# Patient Record
Sex: MALE | URBAN - NONMETROPOLITAN AREA
[De-identification: names, ages, dates, MRNs, and addresses within clinical notes are randomized per-mention and may not be internally consistent; named-entity substitution may affect disease eponyms.]

---

## 2017-08-08 ENCOUNTER — APPOINTMENT (RX ONLY)
Dept: URBAN - NONMETROPOLITAN AREA CLINIC 1 | Facility: CLINIC | Age: 67
Setting detail: DERMATOLOGY
End: 2017-08-08

## 2017-08-08 DIAGNOSIS — L81.4 OTHER MELANIN HYPERPIGMENTATION: ICD-10-CM

## 2017-08-08 DIAGNOSIS — D485 NEOPLASM OF UNCERTAIN BEHAVIOR OF SKIN: ICD-10-CM

## 2017-08-08 DIAGNOSIS — L57.0 ACTINIC KERATOSIS: ICD-10-CM

## 2017-08-08 PROBLEM — D48.5 NEOPLASM OF UNCERTAIN BEHAVIOR OF SKIN: Status: ACTIVE | Noted: 2017-08-08

## 2017-08-08 PROBLEM — I10 ESSENTIAL (PRIMARY) HYPERTENSION: Status: ACTIVE | Noted: 2017-08-08

## 2017-08-08 PROBLEM — I25.10 ATHEROSCLEROTIC HEART DISEASE OF NATIVE CORONARY ARTERY WITHOUT ANGINA PECTORIS: Status: ACTIVE | Noted: 2017-08-08

## 2017-08-08 PROCEDURE — 17003 DESTRUCT PREMALG LES 2-14: CPT

## 2017-08-08 PROCEDURE — 11100: CPT | Mod: 59

## 2017-08-08 PROCEDURE — ? OBSERVATION

## 2017-08-08 PROCEDURE — ? LIQUID NITROGEN

## 2017-08-08 PROCEDURE — ? BIOPSY BY SHAVE METHOD

## 2017-08-08 PROCEDURE — 99202 OFFICE O/P NEW SF 15 MIN: CPT | Mod: 25

## 2017-08-08 PROCEDURE — ? COUNSELING

## 2017-08-08 PROCEDURE — 17000 DESTRUCT PREMALG LESION: CPT

## 2017-08-08 ASSESSMENT — LOCATION DETAILED DESCRIPTION DERM
LOCATION DETAILED: NASAL DORSUM
LOCATION DETAILED: RIGHT SUPERIOR UPPER BACK
LOCATION DETAILED: LEFT CLAVICULAR NECK
LOCATION DETAILED: RIGHT INFERIOR LATERAL FOREHEAD

## 2017-08-08 ASSESSMENT — LOCATION SIMPLE DESCRIPTION DERM
LOCATION SIMPLE: NOSE
LOCATION SIMPLE: LEFT ANTERIOR NECK
LOCATION SIMPLE: RIGHT UPPER BACK
LOCATION SIMPLE: RIGHT FOREHEAD

## 2017-08-08 ASSESSMENT — LOCATION ZONE DERM
LOCATION ZONE: FACE
LOCATION ZONE: NECK
LOCATION ZONE: NOSE
LOCATION ZONE: TRUNK

## 2017-08-08 NOTE — PROCEDURE: LIQUID NITROGEN
Detail Level: Simple
Post-Care Instructions: I reviewed with the patient in detail post-care instructions. Patient is to wear sunprotection, and avoid picking at any of the treated lesions. Pt may apply Vaseline to crusted or scabbing areas.
Render Post-Care Instructions In Note?: no
Duration Of Freeze Thaw-Cycle (Seconds): 8
Consent: The patient's consent was obtained including but not limited to risks of crusting, scabbing, blistering, scarring, darker or lighter pigmentary change, recurrence, incomplete removal and infection.
Number Of Freeze-Thaw Cycles: 1 freeze-thaw cycle

## 2017-08-21 DIAGNOSIS — E78.5 DYSLIPIDEMIA: ICD-10-CM

## 2017-08-21 RX ORDER — SIMVASTATIN 40 MG
40 TABLET ORAL EVERY EVENING
Qty: 90 TAB | Refills: 0 | OUTPATIENT
Start: 2017-08-21 | End: 2018-01-12 | Stop reason: SDUPTHER

## 2017-10-19 ENCOUNTER — TELEPHONE (OUTPATIENT)
Dept: CARDIOLOGY | Facility: MEDICAL CENTER | Age: 67
End: 2017-10-19

## 2017-10-19 DIAGNOSIS — E78.5 DYSLIPIDEMIA: ICD-10-CM

## 2017-10-19 DIAGNOSIS — I25.10 CORONARY ARTERY DISEASE INVOLVING NATIVE CORONARY ARTERY OF NATIVE HEART WITHOUT ANGINA PECTORIS: ICD-10-CM

## 2017-10-19 NOTE — TELEPHONE ENCOUNTER
Called patient, he has not had lab work done since 2016.     Labs ordered, slips mailed to patient. Patient does mention that he will have his lab work done at a [facility].    BEE PRATT

## 2017-10-19 NOTE — TELEPHONE ENCOUNTER
----- Message from Isaiah Mccormick sent at 10/19/2017  9:35 AM PDT -----  Regarding: Question about labs for upcoming visit with LA 10/31  Contact: [phone]  LA/Yvette Sullivan wants to know if he's need's labs done in preparation for upcoming visit 10/31 with LA. Pt can be reached at [phone].

## 2017-10-24 ENCOUNTER — HOSPITAL ENCOUNTER (OUTPATIENT)
Dept: LAB | Facility: MEDICAL CENTER | Age: 67
End: 2017-10-24
Attending: INTERNAL MEDICINE
Payer: MEDICARE

## 2017-10-24 DIAGNOSIS — E78.5 DYSLIPIDEMIA: ICD-10-CM

## 2017-10-24 DIAGNOSIS — I25.10 CORONARY ARTERY DISEASE INVOLVING NATIVE CORONARY ARTERY OF NATIVE HEART WITHOUT ANGINA PECTORIS: ICD-10-CM

## 2017-10-24 LAB
ALBUMIN SERPL BCP-MCNC: 4 G/DL (ref 3.2–4.9)
ALBUMIN/GLOB SERPL: 1.4 G/DL
ALP SERPL-CCNC: 64 U/L (ref 30–99)
ALT SERPL-CCNC: 14 U/L (ref 2–50)
ANION GAP SERPL CALC-SCNC: 7 MMOL/L (ref 0–11.9)
AST SERPL-CCNC: 21 U/L (ref 12–45)
BILIRUB SERPL-MCNC: 0.6 MG/DL (ref 0.1–1.5)
BUN SERPL-MCNC: 19 MG/DL (ref 8–22)
CALCIUM SERPL-MCNC: 9.4 MG/DL (ref 8.5–10.5)
CHLORIDE SERPL-SCNC: 106 MMOL/L (ref 96–112)
CHOLEST SERPL-MCNC: 130 MG/DL (ref 100–199)
CO2 SERPL-SCNC: 27 MMOL/L (ref 20–33)
CREAT SERPL-MCNC: 0.89 MG/DL (ref 0.5–1.4)
GFR SERPL CREATININE-BSD FRML MDRD: >60 ML/MIN/1.73 M 2
GLOBULIN SER CALC-MCNC: 2.9 G/DL (ref 1.9–3.5)
GLUCOSE SERPL-MCNC: 85 MG/DL (ref 65–99)
HDLC SERPL-MCNC: 56 MG/DL
LDLC SERPL CALC-MCNC: 64 MG/DL
POTASSIUM SERPL-SCNC: 4 MMOL/L (ref 3.6–5.5)
PROT SERPL-MCNC: 6.9 G/DL (ref 6–8.2)
SODIUM SERPL-SCNC: 140 MMOL/L (ref 135–145)
TRIGL SERPL-MCNC: 52 MG/DL (ref 0–149)

## 2017-10-24 PROCEDURE — 80061 LIPID PANEL: CPT

## 2017-10-24 PROCEDURE — 80053 COMPREHEN METABOLIC PANEL: CPT

## 2017-10-24 PROCEDURE — 36415 COLL VENOUS BLD VENIPUNCTURE: CPT

## 2017-10-31 ENCOUNTER — OFFICE VISIT (OUTPATIENT)
Dept: CARDIOLOGY | Facility: MEDICAL CENTER | Age: 67
End: 2017-10-31
Payer: MEDICARE

## 2017-10-31 VITALS
WEIGHT: 143.2 LBS | DIASTOLIC BLOOD PRESSURE: 92 MMHG | HEART RATE: 80 BPM | OXYGEN SATURATION: 98 % | HEIGHT: 67 IN | BODY MASS INDEX: 22.47 KG/M2 | SYSTOLIC BLOOD PRESSURE: 142 MMHG

## 2017-10-31 DIAGNOSIS — I25.10 CORONARY ARTERY DISEASE INVOLVING NATIVE CORONARY ARTERY OF NATIVE HEART WITHOUT ANGINA PECTORIS: ICD-10-CM

## 2017-10-31 PROCEDURE — 99214 OFFICE O/P EST MOD 30 MIN: CPT | Performed by: INTERNAL MEDICINE

## 2017-10-31 ASSESSMENT — ENCOUNTER SYMPTOMS
NAUSEA: 0
SPEECH CHANGE: 0
LOSS OF CONSCIOUSNESS: 0
COUGH: 0
DEPRESSION: 0
MYALGIAS: 0
WEIGHT LOSS: 0
WHEEZING: 0
PALPITATIONS: 0
NERVOUS/ANXIOUS: 0
ABDOMINAL PAIN: 0
HEARTBURN: 0

## 2017-10-31 NOTE — PROGRESS NOTES
"Subjective:   Kaleb Yanes is a 67 y.o. male who presents today In follow-up in regards to his remote bypass surgery and hyperlipidemia    Doing well  Parents in [place], working with them and perhaps assisted living  No setbacks, medications as directed    Past Medical History:   Diagnosis Date   • CAD (coronary artery disease)    • Hyperlipidemia    • Hypertension      Past Surgical History:   Procedure Laterality Date   • CARDIAC CATH  8/16/07    BMS to Circ   • OTHER CARDIAC SURGERY      stent x 1 aug 07     Family History   Problem Relation Age of Onset   • Heart Failure Neg Hx    • Heart Disease Neg Hx      History   Smoking Status   • Former Smoker   • Years: 30.00   • Quit date: 8/16/2007   Smokeless Tobacco   • Never Used     Comment: quit aug 15 07     Allergies   Allergen Reactions   • Nkda [No Known Drug Allergy]      Outpatient Encounter Prescriptions as of 10/31/2017   Medication Sig Dispense Refill   • simvastatin (ZOCOR) 40 MG Tab Take 1 Tab by mouth every evening. Please see your cardiologist for refills 90 Tab 0   • ASPIRIN 81 MG PO TABS 81 mg by Oral route QDAILY (RT).        No facility-administered encounter medications on file as of 10/31/2017.      Review of Systems   Constitutional: Negative for malaise/fatigue and weight loss.   HENT: Negative for congestion.    Respiratory: Negative for cough and wheezing.    Cardiovascular: Negative for chest pain, palpitations and leg swelling.   Gastrointestinal: Negative for abdominal pain, heartburn and nausea.   Musculoskeletal: Negative for joint pain and myalgias.   Neurological: Negative for speech change and loss of consciousness.   Psychiatric/Behavioral: Negative for depression. The patient is not nervous/anxious.    All other systems reviewed and are negative.       Objective:   /92   Pulse 80   Ht 1.702 m (5' 7\")   Wt 65 kg (143 lb 3.2 oz)   SpO2 98%   BMI 22.43 kg/m²     Physical Exam   Constitutional: He is oriented to " person, place, and time. He appears well-developed and well-nourished.   Thin, healthy-appearing   HENT:   Head: Normocephalic and atraumatic.   Mouth/Throat: Mucous membranes are normal.   Eyes: EOM are normal. Pupils are equal, round, and reactive to light. No scleral icterus.   Neck: No JVD present. No thyroid mass and no thyromegaly present.   Cardiovascular: Normal rate, regular rhythm and intact distal pulses.    No murmur heard.  Pulmonary/Chest: Effort normal and breath sounds normal. He exhibits no tenderness.   Abdominal: Bowel sounds are normal.   Musculoskeletal: Normal range of motion. He exhibits no edema.   Neurological: He is alert and oriented to person, place, and time. He has normal strength. He displays no tremor.   Skin: Skin is warm and dry. No rash noted.   Psychiatric: He has a normal mood and affect. His behavior is normal.   Vitals reviewed.      Assessment:     1. Coronary artery disease involving native coronary artery of native heart without angina pectoris         Medical Decision Making:  Today's Assessment / Status / Plan:       Coronary disease   Low risk nuclear and echo last year, unfortunately awfully expensive for him. I apologized again   Laboratory data from last week reviewed. Normal and reassuring. Repeat annually     Lipids   As above, on a statin     Discussed prognosis which is quite good. Left ventricular function normal. Follow-up in one year or as needed     Questions answered     eeling much better, seen and examined. Has had no more pain since using bengay on the left side of his neck    Still very active, thinking about starting yoga and stretching

## 2018-01-12 DIAGNOSIS — E78.5 DYSLIPIDEMIA: ICD-10-CM

## 2018-01-12 RX ORDER — SIMVASTATIN 40 MG
TABLET ORAL
Qty: 90 TAB | Refills: 3 | Status: SHIPPED | OUTPATIENT
Start: 2018-01-12 | End: 2019-01-15 | Stop reason: SDUPTHER

## 2019-01-15 DIAGNOSIS — E78.5 DYSLIPIDEMIA: ICD-10-CM

## 2019-01-16 RX ORDER — SIMVASTATIN 40 MG
40 TABLET ORAL EVERY EVENING
Qty: 90 TAB | Refills: 0 | Status: SHIPPED | OUTPATIENT
Start: 2019-01-16 | End: 2019-05-17 | Stop reason: SDUPTHER

## 2019-03-11 ENCOUNTER — TELEPHONE (OUTPATIENT)
Dept: CARDIOLOGY | Facility: MEDICAL CENTER | Age: 69
End: 2019-03-11

## 2019-03-11 DIAGNOSIS — Z95.5 S/P CORONARY ARTERY STENT PLACEMENT: ICD-10-CM

## 2019-03-11 DIAGNOSIS — E78.5 DYSLIPIDEMIA: ICD-10-CM

## 2019-03-11 DIAGNOSIS — I25.10 CORONARY ARTERY DISEASE INVOLVING NATIVE CORONARY ARTERY OF NATIVE HEART WITHOUT ANGINA PECTORIS: ICD-10-CM

## 2019-03-11 NOTE — TELEPHONE ENCOUNTER
OLIVE Chahal/Leana       Patient is calling to ask if he needs labs for 03/28 appt. He can be reached at [phone].      CMP and Lipid profile ordered, lab slip mailed to pt.     Called pt and notified, pt verbalizes understanding

## 2019-03-19 ENCOUNTER — HOSPITAL ENCOUNTER (OUTPATIENT)
Dept: LAB | Facility: MEDICAL CENTER | Age: 69
End: 2019-03-19
Attending: INTERNAL MEDICINE
Payer: MEDICARE

## 2019-03-19 DIAGNOSIS — Z95.5 S/P CORONARY ARTERY STENT PLACEMENT: ICD-10-CM

## 2019-03-19 DIAGNOSIS — E78.5 DYSLIPIDEMIA: ICD-10-CM

## 2019-03-19 DIAGNOSIS — I25.10 CORONARY ARTERY DISEASE INVOLVING NATIVE CORONARY ARTERY OF NATIVE HEART WITHOUT ANGINA PECTORIS: ICD-10-CM

## 2019-03-19 LAB
ALBUMIN SERPL BCP-MCNC: 4.3 G/DL (ref 3.2–4.9)
ALBUMIN/GLOB SERPL: 1.3 G/DL
ALP SERPL-CCNC: 59 U/L (ref 30–99)
ALT SERPL-CCNC: 17 U/L (ref 2–50)
ANION GAP SERPL CALC-SCNC: 8 MMOL/L (ref 0–11.9)
AST SERPL-CCNC: 20 U/L (ref 12–45)
BILIRUB SERPL-MCNC: 0.5 MG/DL (ref 0.1–1.5)
BUN SERPL-MCNC: 18 MG/DL (ref 8–22)
CALCIUM SERPL-MCNC: 8.9 MG/DL (ref 8.5–10.5)
CHLORIDE SERPL-SCNC: 106 MMOL/L (ref 96–112)
CHOLEST SERPL-MCNC: 135 MG/DL (ref 100–199)
CO2 SERPL-SCNC: 27 MMOL/L (ref 20–33)
CREAT SERPL-MCNC: 0.92 MG/DL (ref 0.5–1.4)
FASTING STATUS PATIENT QL REPORTED: NORMAL
GLOBULIN SER CALC-MCNC: 3.2 G/DL (ref 1.9–3.5)
GLUCOSE SERPL-MCNC: 86 MG/DL (ref 65–99)
HDLC SERPL-MCNC: 54 MG/DL
LDLC SERPL CALC-MCNC: 70 MG/DL
POTASSIUM SERPL-SCNC: 4.1 MMOL/L (ref 3.6–5.5)
PROT SERPL-MCNC: 7.5 G/DL (ref 6–8.2)
SODIUM SERPL-SCNC: 141 MMOL/L (ref 135–145)
TRIGL SERPL-MCNC: 55 MG/DL (ref 0–149)

## 2019-03-19 PROCEDURE — 36415 COLL VENOUS BLD VENIPUNCTURE: CPT

## 2019-03-19 PROCEDURE — 80053 COMPREHEN METABOLIC PANEL: CPT

## 2019-03-19 PROCEDURE — 80061 LIPID PANEL: CPT

## 2019-03-28 ENCOUNTER — OFFICE VISIT (OUTPATIENT)
Dept: CARDIOLOGY | Facility: MEDICAL CENTER | Age: 69
End: 2019-03-28
Payer: MEDICARE

## 2019-03-28 VITALS
SYSTOLIC BLOOD PRESSURE: 120 MMHG | BODY MASS INDEX: 23.54 KG/M2 | HEART RATE: 82 BPM | OXYGEN SATURATION: 98 % | DIASTOLIC BLOOD PRESSURE: 80 MMHG | WEIGHT: 150 LBS | HEIGHT: 67 IN

## 2019-03-28 DIAGNOSIS — Z95.5 S/P CORONARY ARTERY STENT PLACEMENT: ICD-10-CM

## 2019-03-28 DIAGNOSIS — E78.5 DYSLIPIDEMIA: ICD-10-CM

## 2019-03-28 DIAGNOSIS — I25.10 CORONARY ARTERY DISEASE INVOLVING NATIVE CORONARY ARTERY OF NATIVE HEART WITHOUT ANGINA PECTORIS: ICD-10-CM

## 2019-03-28 PROCEDURE — 99214 OFFICE O/P EST MOD 30 MIN: CPT | Performed by: INTERNAL MEDICINE

## 2019-03-28 RX ORDER — ROSUVASTATIN CALCIUM 40 MG/1
40 TABLET, COATED ORAL DAILY
Qty: 90 TAB | Refills: 3 | Status: SHIPPED | OUTPATIENT
Start: 2019-03-28 | End: 2019-05-15

## 2019-03-28 ASSESSMENT — ENCOUNTER SYMPTOMS
PALPITATIONS: 0
ABDOMINAL PAIN: 0
DEPRESSION: 0
HEMOPTYSIS: 0
EYE DISCHARGE: 0
BRUISES/BLEEDS EASILY: 0
WHEEZING: 0
EYE PAIN: 0
FEVER: 0
VOMITING: 0
SPEECH CHANGE: 0
NERVOUS/ANXIOUS: 0
COUGH: 0
LOSS OF CONSCIOUSNESS: 0
MYALGIAS: 0
BLURRED VISION: 0
NAUSEA: 0
CHILLS: 0

## 2019-04-09 ENCOUNTER — APPOINTMENT (RX ONLY)
Dept: URBAN - NONMETROPOLITAN AREA CLINIC 1 | Facility: CLINIC | Age: 69
Setting detail: DERMATOLOGY
End: 2019-04-09

## 2019-04-09 DIAGNOSIS — L82.1 OTHER SEBORRHEIC KERATOSIS: ICD-10-CM

## 2019-04-09 PROCEDURE — 99213 OFFICE O/P EST LOW 20 MIN: CPT

## 2019-04-09 PROCEDURE — ? COUNSELING

## 2019-04-09 ASSESSMENT — LOCATION DETAILED DESCRIPTION DERM: LOCATION DETAILED: LEFT MEDIAL SUPERIOR CHEST

## 2019-04-09 ASSESSMENT — LOCATION SIMPLE DESCRIPTION DERM: LOCATION SIMPLE: CHEST

## 2019-04-09 ASSESSMENT — LOCATION ZONE DERM: LOCATION ZONE: TRUNK

## 2019-05-15 ENCOUNTER — TELEPHONE (OUTPATIENT)
Dept: CARDIOLOGY | Facility: MEDICAL CENTER | Age: 69
End: 2019-05-15

## 2019-05-15 DIAGNOSIS — E78.5 DYSLIPIDEMIA: ICD-10-CM

## 2019-05-15 RX ORDER — SIMVASTATIN 40 MG
40 TABLET ORAL EVERY EVENING
Qty: 90 TAB | Refills: 3 | Status: CANCELLED | OUTPATIENT
Start: 2019-05-15

## 2019-05-15 NOTE — TELEPHONE ENCOUNTER
Jenny Simental, Med Ass't  Leana Castanon, R.N.             Hi, there are two statins listed on the med list, please remove the statin therapy that is not current   Thank you      Called pt, clarified statin medications, pt reports he is taking Simvastatin and not Rosuvastatin.     MAR updated.     FYI to Jenny

## 2019-05-17 ENCOUNTER — TELEPHONE (OUTPATIENT)
Dept: CARDIOLOGY | Facility: MEDICAL CENTER | Age: 69
End: 2019-05-17

## 2019-05-17 DIAGNOSIS — E78.5 DYSLIPIDEMIA: ICD-10-CM

## 2019-05-17 RX ORDER — SIMVASTATIN 40 MG
40 TABLET ORAL EVERY EVENING
Qty: 90 TAB | Refills: 3 | Status: SHIPPED | OUTPATIENT
Start: 2019-05-17 | End: 2021-05-26

## 2019-05-17 NOTE — TELEPHONE ENCOUNTER
Problem with prescription refill going out of town tomorrow   Received: Today   Message Contents   Melissa Alberto R.N.   Phone Number: [phone]             LA/Malina     Pt reports there is a problem with his prescription refill for simvastatin 40 mg. He is going out of town tomorrow and would please like a call back soon at [phone].      Called pt, pt reports that he was unable to fill simvastatin RX due to crestor being on file. Pt confirmed that he is taking simvastatin, not crestor. Refill for simvastatin sent to [facility] pharmacy per pt's request. Called pharmacy to discontinue crestor.    FYI to Dr. Quintanilla.

## 2019-08-14 ENCOUNTER — TELEPHONE (OUTPATIENT)
Dept: CARDIOLOGY | Facility: MEDICAL CENTER | Age: 69
End: 2019-08-14

## 2019-08-14 NOTE — TELEPHONE ENCOUNTER
Tanisha Quintanilla M.D.  Leana Castanon R.N.             I spoke to this patient this morning.  Having some chest pain but felt better.  He lives somewhat far away.  I suggested go to the ER if he did not feel better if not happy to talk about an angiogram or nuclear perfusion imaging study based on his history.  He does not need to be seen to have these set up.  Could you let me know?  Thank you so much        Called pt, pt reports he is feeling better, he will cont to monitor, he will let us know if symptoms comes back or he will go to nearest ER if symptoms worsens. Pt appreciative of call     FYI to Dr Quintanilla

## 2021-05-07 ENCOUNTER — TELEPHONE (OUTPATIENT)
Dept: CARDIOLOGY | Facility: MEDICAL CENTER | Age: 71
End: 2021-05-07

## 2021-05-07 DIAGNOSIS — Z95.5 S/P CORONARY ARTERY STENT PLACEMENT: ICD-10-CM

## 2021-05-07 DIAGNOSIS — E78.5 DYSLIPIDEMIA: ICD-10-CM

## 2021-05-07 DIAGNOSIS — I25.10 CORONARY ARTERY DISEASE INVOLVING NATIVE CORONARY ARTERY OF NATIVE HEART WITHOUT ANGINA PECTORIS: ICD-10-CM

## 2021-05-07 NOTE — TELEPHONE ENCOUNTER
MARY KATE    Pt called asking if he needs to have blood work done before his appt with DA scheduled for 5/26/21. Pt states he will be using a [facility]. Please call Pt back [phone].    Thank you

## 2021-05-07 NOTE — TELEPHONE ENCOUNTER
Called pt and notified that last labs on file are from 2019, so he is due for routine fasting labs. CMP and lipid order placed.

## 2021-05-20 ENCOUNTER — HOSPITAL ENCOUNTER (OUTPATIENT)
Dept: LAB | Facility: MEDICAL CENTER | Age: 71
End: 2021-05-20
Attending: INTERNAL MEDICINE
Payer: MEDICARE

## 2021-05-20 DIAGNOSIS — Z95.5 S/P CORONARY ARTERY STENT PLACEMENT: ICD-10-CM

## 2021-05-20 DIAGNOSIS — E78.5 DYSLIPIDEMIA: ICD-10-CM

## 2021-05-20 DIAGNOSIS — I25.10 CORONARY ARTERY DISEASE INVOLVING NATIVE CORONARY ARTERY OF NATIVE HEART WITHOUT ANGINA PECTORIS: ICD-10-CM

## 2021-05-20 LAB
ALBUMIN SERPL BCP-MCNC: 4.2 G/DL (ref 3.2–4.9)
ALBUMIN/GLOB SERPL: 1.4 G/DL
ALP SERPL-CCNC: 69 U/L (ref 30–99)
ALT SERPL-CCNC: 17 U/L (ref 2–50)
ANION GAP SERPL CALC-SCNC: 9 MMOL/L (ref 7–16)
AST SERPL-CCNC: 18 U/L (ref 12–45)
BILIRUB SERPL-MCNC: 0.5 MG/DL (ref 0.1–1.5)
BUN SERPL-MCNC: 18 MG/DL (ref 8–22)
CALCIUM SERPL-MCNC: 9.3 MG/DL (ref 8.5–10.5)
CHLORIDE SERPL-SCNC: 105 MMOL/L (ref 96–112)
CHOLEST SERPL-MCNC: 200 MG/DL (ref 100–199)
CO2 SERPL-SCNC: 24 MMOL/L (ref 20–33)
CREAT SERPL-MCNC: 0.88 MG/DL (ref 0.5–1.4)
FASTING STATUS PATIENT QL REPORTED: NORMAL
GLOBULIN SER CALC-MCNC: 3.1 G/DL (ref 1.9–3.5)
GLUCOSE SERPL-MCNC: 95 MG/DL (ref 65–99)
HDLC SERPL-MCNC: 58 MG/DL
LDLC SERPL CALC-MCNC: 129 MG/DL
POTASSIUM SERPL-SCNC: 4.4 MMOL/L (ref 3.6–5.5)
PROT SERPL-MCNC: 7.3 G/DL (ref 6–8.2)
SODIUM SERPL-SCNC: 138 MMOL/L (ref 135–145)
TRIGL SERPL-MCNC: 65 MG/DL (ref 0–149)

## 2021-05-20 PROCEDURE — 80061 LIPID PANEL: CPT

## 2021-05-20 PROCEDURE — 36415 COLL VENOUS BLD VENIPUNCTURE: CPT

## 2021-05-20 PROCEDURE — 80053 COMPREHEN METABOLIC PANEL: CPT

## 2021-05-26 ENCOUNTER — OFFICE VISIT (OUTPATIENT)
Dept: CARDIOLOGY | Facility: MEDICAL CENTER | Age: 71
End: 2021-05-26
Payer: MEDICARE

## 2021-05-26 VITALS
HEART RATE: 82 BPM | DIASTOLIC BLOOD PRESSURE: 84 MMHG | BODY MASS INDEX: 22.91 KG/M2 | HEIGHT: 67 IN | SYSTOLIC BLOOD PRESSURE: 138 MMHG | WEIGHT: 146 LBS | OXYGEN SATURATION: 98 %

## 2021-05-26 DIAGNOSIS — E78.00 ELEVATED LDL CHOLESTEROL LEVEL: ICD-10-CM

## 2021-05-26 DIAGNOSIS — E78.5 DYSLIPIDEMIA: ICD-10-CM

## 2021-05-26 DIAGNOSIS — Z95.5 S/P CORONARY ARTERY STENT PLACEMENT: ICD-10-CM

## 2021-05-26 DIAGNOSIS — I25.10 CORONARY ARTERY DISEASE INVOLVING NATIVE CORONARY ARTERY OF NATIVE HEART WITHOUT ANGINA PECTORIS: ICD-10-CM

## 2021-05-26 DIAGNOSIS — I10 ESSENTIAL HYPERTENSION: ICD-10-CM

## 2021-05-26 LAB — EKG IMPRESSION: NORMAL

## 2021-05-26 PROCEDURE — 99214 OFFICE O/P EST MOD 30 MIN: CPT | Performed by: INTERNAL MEDICINE

## 2021-05-26 PROCEDURE — 93000 ELECTROCARDIOGRAM COMPLETE: CPT | Performed by: INTERNAL MEDICINE

## 2021-05-26 RX ORDER — SIMVASTATIN 40 MG
TABLET ORAL
COMMUNITY
End: 2021-05-26

## 2021-05-26 RX ORDER — ATORVASTATIN CALCIUM 20 MG/1
20 TABLET, FILM COATED ORAL DAILY
Qty: 90 TABLET | Refills: 3 | Status: SHIPPED | OUTPATIENT
Start: 2021-05-26 | End: 2022-07-20 | Stop reason: SDUPTHER

## 2021-05-26 ASSESSMENT — ENCOUNTER SYMPTOMS
NIGHT SWEATS: 0
PARESTHESIAS: 0
SHORTNESS OF BREATH: 0
BLURRED VISION: 0
LOSS OF BALANCE: 0
DYSPNEA ON EXERTION: 0
IRREGULAR HEARTBEAT: 0
WEAKNESS: 0
WHEEZING: 0
BACK PAIN: 0
MYALGIAS: 0
HEADACHES: 0
CONSTIPATION: 0
NUMBNESS: 0
SORE THROAT: 0
ORTHOPNEA: 0
SYNCOPE: 0
LIGHT-HEADEDNESS: 0
NEAR-SYNCOPE: 0
EXCESSIVE DAYTIME SLEEPINESS: 0
NAUSEA: 0
VOMITING: 0
FLANK PAIN: 0
DIZZINESS: 0
FALLS: 0
COUGH: 0
DIARRHEA: 0
SLEEP DISTURBANCES DUE TO BREATHING: 0
PALPITATIONS: 0
PND: 0
DOUBLE VISION: 0
DIAPHORESIS: 0
BLOATING: 0
DECREASED APPETITE: 0
FEVER: 0

## 2021-05-26 NOTE — PROGRESS NOTES
Cardiology Follow-up Consultation Note    Date of note:    5/26/2021    Primary Care Provider: Pcp Pt States None    Name:             Kaleb Yanes   YOB: 1950  MRN:               [id number]    Chief Complaint   Patient presents with   • Coronary Artery Disease   • Hyperlipidemia       HISTORY OF PRESENT ILLNESS  Mr. Kaleb Yanes is a 71 y.o. male who returns to see us for follow-up of coronary artery disease s/p stent in August 2007, hypertension and hyperlipidemia    Last clinic visit: 3/28/2019 with Tanisha Quintanilla M.D.    Interim History:  Since his last visit, patient reports    For hyperlipidemia, was on simvastatin 40 mg which he discontinued about a year ago after he ran out of the medication.    In regards to physical activity, rides his bike daily, daily walks along with exercise at home.  Yesterday, rode his mountain bike for 55 mins without any chest pain/pressure, dyspnea, orthopnea or lower extremity edema.      Review of Systems   Constitutional: Negative for decreased appetite, diaphoresis, fever, malaise/fatigue and night sweats.   HENT: Negative for congestion and sore throat.    Eyes: Negative for blurred vision and double vision.   Cardiovascular: Negative for chest pain, cyanosis, dyspnea on exertion, irregular heartbeat, leg swelling, near-syncope, orthopnea, palpitations, paroxysmal nocturnal dyspnea and syncope.   Respiratory: Negative for cough, shortness of breath, sleep disturbances due to breathing and wheezing.    Endocrine: Negative for cold intolerance and heat intolerance.   Musculoskeletal: Negative for back pain, falls and myalgias.   Gastrointestinal: Negative for bloating, constipation, diarrhea, nausea and vomiting.   Genitourinary: Negative for dysuria and flank pain.   Neurological: Negative for excessive daytime sleepiness, dizziness, headaches, light-headedness, loss of balance, numbness, paresthesias and weakness.         Past Medical  History:   Diagnosis Date   • CAD (coronary artery disease)    • Hyperlipidemia    • Hypertension          Past Surgical History:   Procedure Laterality Date   • ZZZ CARDIAC CATH  07    BMS to Circ   • OTHER CARDIAC SURGERY      stent x 1 aug 07         Current Outpatient Medications   Medication Sig Dispense Refill   • atorvastatin (LIPITOR) 20 MG Tab Take 1 tablet by mouth every day. 90 tablet 3   • ASPIRIN 81 MG PO TABS 81 mg by Oral route QDAILY (RT).        No current facility-administered medications for this visit.         Allergies   Allergen Reactions   • Nkda [No Known Drug Allergy]          Family History   Problem Relation Age of Onset   • Heart Failure Neg Hx    • Heart Disease Neg Hx          Social History     Socioeconomic History   • Marital status:      Spouse name: Not on file   • Number of children: Not on file   • Years of education: Not on file   • Highest education level: Not on file   Occupational History   • Not on file   Tobacco Use   • Smoking status: Former Smoker     Years: 30.00     Quit date: 2007     Years since quittin.7   • Smokeless tobacco: Never Used   • Tobacco comment: quit aug 15 07   Substance and Sexual Activity   • Alcohol use: Yes     Comment: occasional   • Drug use: No   • Sexual activity: Not on file   Other Topics Concern   • Not on file   Social History Narrative   • Not on file     Social Determinants of Health     Financial Resource Strain:    • Difficulty of Paying Living Expenses:    Food Insecurity:    • Worried About Running Out of Food in the Last Year:    • Ran Out of Food in the Last Year:    Transportation Needs:    • Lack of Transportation (Medical):    • Lack of Transportation (Non-Medical):    Physical Activity:    • Days of Exercise per Week:    • Minutes of Exercise per Session:    Stress:    • Feeling of Stress :    Social Connections:    • Frequency of Communication with Friends and Family:    • Frequency of Social Gatherings  "with Friends and Family:    • Attends Sabianism Services:    • Active Member of Clubs or Organizations:    • Attends Club or Organization Meetings:    • Marital Status:    Intimate Partner Violence:    • Fear of Current or Ex-Partner:    • Emotionally Abused:    • Physically Abused:    • Sexually Abused:          Physical Exam:  Ambulatory Vitals  /84 (BP Location: Left arm, Patient Position: Sitting, BP Cuff Size: Adult)   Pulse 82   Ht 1.702 m (5' 7\")   Wt 66.2 kg (146 lb)   SpO2 98%    Oxygen Therapy:  Pulse Oximetry: 98 %  BP Readings from Last 4 Encounters:   05/26/21 138/84   03/28/19 120/80   10/31/17 142/92   08/11/16 130/90       Weight/BMI: Body mass index is 22.87 kg/m².  Wt Readings from Last 4 Encounters:   05/26/21 66.2 kg (146 lb)   03/28/19 68 kg (150 lb)   10/31/17 65 kg (143 lb 3.2 oz)   08/11/16 65.8 kg (145 lb)       GEN: Well developed, well nourished and in no acute distress.  HEART: no significant JVD, regular rate and rhythm, normal S1 and S2, no murmurs, no third heart sounds, normal cardiac palpation  LUNG: clear to auscultation bilaterally, no wheezing, no crackles, normal respiratory effort on room air  ABDOMEN: soft, non-tender, non-distended, normal bowel sounds throughout  EXTREMITIES: no peripheral edema noted  VASCULAR: no significantly elevated jugular venous pressure, no carotid bruits noted, radial pulses 2+ and equal      Lab Data Review:  Lab Results   Component Value Date/Time    CHOLSTRLTOT 200 (H) 05/20/2021 08:17 AM     (H) 05/20/2021 08:17 AM    HDL 58 05/20/2021 08:17 AM    TRIGLYCERIDE 65 05/20/2021 08:17 AM       Lab Results   Component Value Date/Time    SODIUM 138 05/20/2021 08:17 AM    POTASSIUM 4.4 05/20/2021 08:17 AM    CHLORIDE 105 05/20/2021 08:17 AM    CO2 24 05/20/2021 08:17 AM    GLUCOSE 95 05/20/2021 08:17 AM    BUN 18 05/20/2021 08:17 AM    CREATININE 0.88 05/20/2021 08:17 AM    CREATININE 1.0 08/18/2007 05:50 AM     Lab Results   Component " Value Date/Time    ALKPHOSPHAT 69 05/20/2021 08:17 AM    ASTSGOT 18 05/20/2021 08:17 AM    ALTSGPT 17 05/20/2021 08:17 AM    TBILIRUBIN 0.5 05/20/2021 08:17 AM      Lab Results   Component Value Date/Time    WBC 8.9 08/18/2007 05:50 AM         Cardiac Imaging and Procedures Review:    EKG dated 5/26/2021: My personal interpretation is sinus rhythm with heart rate 73 bpm        Assessment & Plan     1. Elevated LDL cholesterol level  atorvastatin (LIPITOR) 20 MG Tab   2. Dyslipidemia  atorvastatin (LIPITOR) 20 MG Tab    Lipid Profile   3. S/P coronary artery stent placement  EKG   4. Coronary artery disease involving native coronary artery of native heart without angina pectoris  EKG   5. Essential hypertension  EKG       Mr. Yanes discontinued simvastatin after running out of refills 1 year ago.  Lipid panel discussed which shows elevated LDL.  After discussion, initiate lipitor 20 mg daily.  Repeat lipid panel in 3 months.  If LDL above goal, will increase Lipitor to 40 mg daily.    Discussed ACC recommendation of performing stress test every 3 to 5 years with history of MI and CAD.  Since he is doing well, is active without any concerning symptoms for angina patient elected to defer stress test at this time which I think is reasonable.  Of course, we will order it if there is any change.     Blood pressure well controlled off of medications.  Continue checking at home with goal <130/80.      All of patient's excellent questions were answered to the best of my knowledge and to his satisfaction.  It was a pleasure seeing Mr. Kaleb Yanes in my clinic today. Return in about 1 year (around 5/26/2022). Patient is aware to call the cardiology clinic with any questions or concerns.      Lincoln Avlarez MD  [facility]  [place] [facility], [address]  [address], [address] [address]  Phone: [phone]  Fax: [phone]

## 2021-08-17 ENCOUNTER — TELEPHONE (OUTPATIENT)
Dept: CARDIOLOGY | Facility: MEDICAL CENTER | Age: 71
End: 2021-08-17

## 2021-08-17 NOTE — TELEPHONE ENCOUNTER
DA    Patient called and stated he had left arm numbness the other day and then had tingling in his left check, no chest pain or other symptoms. Please advise if needs to be seen.    Thank you,    Shital RESTREPO

## 2021-08-17 NOTE — TELEPHONE ENCOUNTER
Pt reports left arm numbness and tingling that occurred a few days ago for about 30sec-1min while he was lying down on the couch. Then yesterday he had tingling in his cheek also while at rest that lasted about 30 seconds. He exercises daily with no symptoms during exercise.   Denies CP, SOB, slurred speech, weakness, dizziness, lightheadedness or diaphoresis. This is the first time he has experienced numbness or tingling like this.     He denies added stress or anything different, just the bad smoke in the air. Has been taking atorvastatin for almost 90 days. Planning on getting a lipid panel soon.     He has no primary provider at this time. Recommended to get PCP and keep a log/diary of his symptoms to determine associations.       To Dr. Alvarez: Any additional recommendations?

## 2021-08-18 NOTE — ADDENDUM NOTE
Addended by: JENNIFER BYRNES on: 8/18/2021 01:29 PM     Modules accepted: Tan     Problem: Pain:  Goal: Pain level will decrease  Pain level will decrease   Outcome: Met This Shift      Problem: Falls - Risk of:  Goal: Will remain free from falls  Will remain free from falls   Outcome: Met This Shift

## 2021-08-18 NOTE — TELEPHONE ENCOUNTER
No additional reccs.  Agree with PCP.  Please let the patient know that if this happens again, it could be concerning for a TIA/stroke.    Thanks!  DA

## 2021-08-18 NOTE — TELEPHONE ENCOUNTER
S/w pt and explained recommendations per Dt. Antonio. Advised that if he were to have stroke symptoms should seek emergency medical attention. Discussed TIA like symptoms as well.     He reports another episode of left arm numbness after using the treadmill this morning. He said he had his neck turned while he was exercising and noticed that his neck was stiff and thought this might be associated. He took an OTC pain reliever and this provided relief from the stiff neck and the numbness/tingling.  He will avoid awkward neck positions and agreed to establish with a PCP. Discussed options and provided contact number to arrange with [facility].

## 2022-07-20 DIAGNOSIS — E78.5 DYSLIPIDEMIA: ICD-10-CM

## 2022-07-20 DIAGNOSIS — E78.00 ELEVATED LDL CHOLESTEROL LEVEL: ICD-10-CM

## 2022-07-20 RX ORDER — ATORVASTATIN CALCIUM 20 MG/1
20 TABLET, FILM COATED ORAL DAILY
Qty: 90 TABLET | Refills: 0 | Status: SHIPPED | OUTPATIENT
Start: 2022-07-20 | End: 2022-10-13 | Stop reason: SDUPTHER

## 2022-08-04 ENCOUNTER — TELEPHONE (OUTPATIENT)
Dept: CARDIOLOGY | Facility: MEDICAL CENTER | Age: 72
End: 2022-08-04
Payer: MEDICARE

## 2022-08-04 DIAGNOSIS — I10 ESSENTIAL HYPERTENSION: ICD-10-CM

## 2022-08-04 DIAGNOSIS — Z95.5 S/P CORONARY ARTERY STENT PLACEMENT: ICD-10-CM

## 2022-08-04 DIAGNOSIS — I25.10 CORONARY ARTERY DISEASE INVOLVING NATIVE CORONARY ARTERY OF NATIVE HEART WITHOUT ANGINA PECTORIS: ICD-10-CM

## 2022-08-04 DIAGNOSIS — E78.5 DYSLIPIDEMIA: ICD-10-CM

## 2022-08-04 DIAGNOSIS — E78.00 ELEVATED LDL CHOLESTEROL LEVEL: ICD-10-CM

## 2022-08-04 NOTE — TELEPHONE ENCOUNTER
DA-      Caller: Marianne from [facility]  Where labs will be completed: [facility]  Fax/Phone number for lab:[phone]; Fax# [phone]  Upcoming Appointment Date: 10/13/22  Callback Number: [phone]          Thank you    -Jignesh CLARKE

## 2022-08-17 ENCOUNTER — HOSPITAL ENCOUNTER (OUTPATIENT)
Dept: LAB | Facility: MEDICAL CENTER | Age: 72
End: 2022-08-17
Attending: INTERNAL MEDICINE
Payer: MEDICARE

## 2022-08-17 DIAGNOSIS — E78.00 ELEVATED LDL CHOLESTEROL LEVEL: ICD-10-CM

## 2022-08-17 DIAGNOSIS — E78.5 DYSLIPIDEMIA: ICD-10-CM

## 2022-08-17 DIAGNOSIS — Z95.5 S/P CORONARY ARTERY STENT PLACEMENT: ICD-10-CM

## 2022-08-17 DIAGNOSIS — I25.10 CORONARY ARTERY DISEASE INVOLVING NATIVE CORONARY ARTERY OF NATIVE HEART WITHOUT ANGINA PECTORIS: ICD-10-CM

## 2022-08-17 DIAGNOSIS — I10 ESSENTIAL HYPERTENSION: ICD-10-CM

## 2022-08-17 LAB
ALBUMIN SERPL BCP-MCNC: 4.3 G/DL (ref 3.2–4.9)
ALBUMIN/GLOB SERPL: 1.5 G/DL
ALP SERPL-CCNC: 70 U/L (ref 30–99)
ALT SERPL-CCNC: 14 U/L (ref 2–50)
ANION GAP SERPL CALC-SCNC: 12 MMOL/L (ref 7–16)
AST SERPL-CCNC: 21 U/L (ref 12–45)
BILIRUB SERPL-MCNC: 0.5 MG/DL (ref 0.1–1.5)
BUN SERPL-MCNC: 21 MG/DL (ref 8–22)
CALCIUM SERPL-MCNC: 9.2 MG/DL (ref 8.5–10.5)
CHLORIDE SERPL-SCNC: 105 MMOL/L (ref 96–112)
CHOLEST SERPL-MCNC: 128 MG/DL (ref 100–199)
CO2 SERPL-SCNC: 22 MMOL/L (ref 20–33)
CREAT SERPL-MCNC: 0.92 MG/DL (ref 0.5–1.4)
FASTING STATUS PATIENT QL REPORTED: NORMAL
GFR SERPLBLD CREATININE-BSD FMLA CKD-EPI: 89 ML/MIN/1.73 M 2
GLOBULIN SER CALC-MCNC: 2.9 G/DL (ref 1.9–3.5)
GLUCOSE SERPL-MCNC: 100 MG/DL (ref 65–99)
HDLC SERPL-MCNC: 53 MG/DL
LDLC SERPL CALC-MCNC: 66 MG/DL
POTASSIUM SERPL-SCNC: 4.1 MMOL/L (ref 3.6–5.5)
PROT SERPL-MCNC: 7.2 G/DL (ref 6–8.2)
SODIUM SERPL-SCNC: 139 MMOL/L (ref 135–145)
TRIGL SERPL-MCNC: 45 MG/DL (ref 0–149)

## 2022-08-17 PROCEDURE — 80053 COMPREHEN METABOLIC PANEL: CPT

## 2022-08-17 PROCEDURE — 80061 LIPID PANEL: CPT

## 2022-08-17 PROCEDURE — 36415 COLL VENOUS BLD VENIPUNCTURE: CPT

## 2022-10-13 ENCOUNTER — OFFICE VISIT (OUTPATIENT)
Dept: CARDIOLOGY | Facility: MEDICAL CENTER | Age: 72
End: 2022-10-13
Payer: MEDICARE

## 2022-10-13 VITALS
HEIGHT: 67 IN | OXYGEN SATURATION: 98 % | RESPIRATION RATE: 16 BRPM | DIASTOLIC BLOOD PRESSURE: 82 MMHG | BODY MASS INDEX: 22.44 KG/M2 | WEIGHT: 143 LBS | SYSTOLIC BLOOD PRESSURE: 136 MMHG | HEART RATE: 70 BPM

## 2022-10-13 DIAGNOSIS — Z95.5 S/P CORONARY ARTERY STENT PLACEMENT: ICD-10-CM

## 2022-10-13 DIAGNOSIS — I25.10 CORONARY ARTERY DISEASE INVOLVING NATIVE CORONARY ARTERY OF NATIVE HEART WITHOUT ANGINA PECTORIS: ICD-10-CM

## 2022-10-13 DIAGNOSIS — E78.5 DYSLIPIDEMIA: ICD-10-CM

## 2022-10-13 PROCEDURE — 99214 OFFICE O/P EST MOD 30 MIN: CPT | Performed by: INTERNAL MEDICINE

## 2022-10-13 RX ORDER — ATORVASTATIN CALCIUM 20 MG/1
20 TABLET, FILM COATED ORAL DAILY
Qty: 90 TABLET | Refills: 7 | Status: SHIPPED | OUTPATIENT
Start: 2022-10-13 | End: 2023-12-04 | Stop reason: SDUPTHER

## 2022-10-13 NOTE — PROGRESS NOTES
Cardiology Follow-up Consultation Note    Date of note:    10/3/2022   Primary Care Provider: Pcp Pt States None    Name:             Kaleb Yanes   YOB: 1950  MRN:               [id number]    Chief Complaint   Patient presents with    Other     F/V Dx: Elevated LDL cholesterol level    Dyslipidemia       HISTORY OF PRESENT ILLNESS  Mr. Kaleb Yanes is a 71 y.o. male who returns to see us for follow-up of coronary artery disease s/p stent in August 2007, hypertension and hyperlipidemia    Last clinic visit: 5/26/2021    Interim History:  Since his last visit, has been doing excellent from a cardiovascular perspective.  Did notice mild GI upset and is now taking aspirin 81 mg every other day with resolution of symptoms.      Continues to remain active and exercise on a daily basis.  Recently completed a trip to [place].  Continues to ride his bike without any anginal symptoms      Past Medical History:   Diagnosis Date    CAD (coronary artery disease)     Hyperlipidemia     Hypertension          Past Surgical History:   Procedure Laterality Date    ZZZ CARDIAC CATH  8/16/07    BMS to Circ    OTHER CARDIAC SURGERY      stent x 1 aug 07         Current Outpatient Medications   Medication Sig Dispense Refill    atorvastatin (LIPITOR) 20 MG Tab Take 1 Tablet by mouth every day. 90 Tablet 7    ASPIRIN 81 MG PO TABS 81 mg by Oral route QDAILY (RT).        No current facility-administered medications for this visit.         Allergies   Allergen Reactions    Nkda [No Known Drug Allergy]          Family History   Problem Relation Age of Onset    Heart Failure Neg Hx     Heart Disease Neg Hx          Social History     Socioeconomic History    Marital status:      Spouse name: Not on file    Number of children: Not on file    Years of education: Not on file    Highest education level: Not on file   Occupational History    Not on file   Tobacco Use    Smoking status: Former      "Years: 30.00     Types: Cigarettes     Quit date: 8/16/2007     Years since quitting: 15.1    Smokeless tobacco: Never    Tobacco comments:     quit aug 15 07   Substance and Sexual Activity    Alcohol use: Yes     Comment: occasional    Drug use: Yes     Types: Marijuana, Oral     Comment: eddiles THC for sleep    Sexual activity: Not on file   Other Topics Concern    Not on file   Social History Narrative    Not on file     Social Determinants of Health     Financial Resource Strain: Not on file   Food Insecurity: Not on file   Transportation Needs: Not on file   Physical Activity: Not on file   Stress: Not on file   Social Connections: Not on file   Intimate Partner Violence: Not on file   Housing Stability: Not on file         Physical Exam:  Ambulatory Vitals  /82 (BP Location: Left arm, Patient Position: Sitting, BP Cuff Size: Adult)   Pulse 70   Resp 16   Ht 1.702 m (5' 7\")   Wt 64.9 kg (143 lb)   SpO2 98%    Oxygen Therapy:  Pulse Oximetry: 98 %  BP Readings from Last 4 Encounters:   10/13/22 136/82   05/26/21 138/84   03/28/19 120/80   10/31/17 142/92       Weight/BMI: Body mass index is 22.4 kg/m².  Wt Readings from Last 4 Encounters:   10/13/22 64.9 kg (143 lb)   05/26/21 66.2 kg (146 lb)   03/28/19 68 kg (150 lb)   10/31/17 65 kg (143 lb 3.2 oz)       GEN: Well developed, well nourished and in no acute distress.  HEART: no significant JVD, regular rate and rhythm, normal S1 and S2, no murmurs, no third heart sounds, normal cardiac palpation  LUNG: clear to auscultation bilaterally, no wheezing, no crackles, normal respiratory effort on room air  ABDOMEN: soft, non-tender, non-distended, normal bowel sounds throughout  EXTREMITIES: no peripheral edema noted  VASCULAR: no significantly elevated jugular venous pressure, no carotid bruits noted, radial pulses 2+ and equal      Lab Data Review:  Lab Results   Component Value Date/Time    CHOLSTRLTOT 128 08/17/2022 08:44 AM    LDL 66 08/17/2022 " 08:44 AM    HDL 53 08/17/2022 08:44 AM    TRIGLYCERIDE 45 08/17/2022 08:44 AM       Lab Results   Component Value Date/Time    SODIUM 139 08/17/2022 08:44 AM    POTASSIUM 4.1 08/17/2022 08:44 AM    CHLORIDE 105 08/17/2022 08:44 AM    CO2 22 08/17/2022 08:44 AM    GLUCOSE 100 (H) 08/17/2022 08:44 AM    BUN 21 08/17/2022 08:44 AM    CREATININE 0.92 08/17/2022 08:44 AM    CREATININE 1.0 08/18/2007 05:50 AM     Lab Results   Component Value Date/Time    ALKPHOSPHAT 70 08/17/2022 08:44 AM    ASTSGOT 21 08/17/2022 08:44 AM    ALTSGPT 14 08/17/2022 08:44 AM    TBILIRUBIN 0.5 08/17/2022 08:44 AM      Lab Results   Component Value Date/Time    WBC 8.9 08/18/2007 05:50 AM         Cardiac Imaging and Procedures Review:    ECG: ECG performed 5/6/2021 was interpreted by me which shows sinus rhythm    Echo: Echocardiogram performed on 2/10/2014 was personally interpreted by me which shows normal left ventricular size and function, no wall motion abnormality.  No valvular heart disease    NM stress test 8/8/2016:  Fixed small defect in apical inferior wall with no evidence of ischemia        Assessment & Plan     1. Coronary artery disease involving native coronary artery of native heart without angina pectoris  atorvastatin (LIPITOR) 20 MG Tab      2. S/P coronary artery stent placement  atorvastatin (LIPITOR) 20 MG Tab      3. Dyslipidemia  atorvastatin (LIPITOR) 20 MG Tab          Doing well from a cardiovascular perspective.  Is currently taking aspirin 81 mg every other day.  We discussed initiating over-the-counter antiacid medications.  He would like to continue taking aspirin every other day rather than taking an additional medication.  Does understand risk of peptic ulcer with aspirin use.  If symptoms recur, will take antiacid medication.    Lipid panel reviewed with LDL within goal.  Continue Lipitor 20 mg daily.    Blood pressure well controlled.    All of patient's excellent questions were answered to the best of my  knowledge and to his satisfaction.  It was a pleasure seeing Mr. Kaleb Yanes in my clinic today. Return in about 1 year (around 10/13/2023). Patient is aware to call the cardiology clinic with any questions or concerns.      Lincoln Alvarez MD  [facility], [address]  [address] [address]  Phone: [phone]  Fax: [phone]

## 2023-12-04 ENCOUNTER — OFFICE VISIT (OUTPATIENT)
Dept: CARDIOLOGY | Facility: MEDICAL CENTER | Age: 73
End: 2023-12-04
Attending: NURSE PRACTITIONER
Payer: MEDICARE

## 2023-12-04 VITALS
OXYGEN SATURATION: 99 % | HEIGHT: 67 IN | SYSTOLIC BLOOD PRESSURE: 128 MMHG | BODY MASS INDEX: 22.13 KG/M2 | WEIGHT: 141 LBS | DIASTOLIC BLOOD PRESSURE: 84 MMHG | HEART RATE: 88 BPM | RESPIRATION RATE: 16 BRPM

## 2023-12-04 DIAGNOSIS — Z95.5 S/P CORONARY ARTERY STENT PLACEMENT: ICD-10-CM

## 2023-12-04 DIAGNOSIS — E78.5 DYSLIPIDEMIA: ICD-10-CM

## 2023-12-04 DIAGNOSIS — I25.10 CORONARY ARTERY DISEASE INVOLVING NATIVE CORONARY ARTERY OF NATIVE HEART WITHOUT ANGINA PECTORIS: ICD-10-CM

## 2023-12-04 PROCEDURE — 99214 OFFICE O/P EST MOD 30 MIN: CPT | Performed by: NURSE PRACTITIONER

## 2023-12-04 PROCEDURE — 99211 OFF/OP EST MAY X REQ PHY/QHP: CPT | Performed by: NURSE PRACTITIONER

## 2023-12-04 PROCEDURE — 3079F DIAST BP 80-89 MM HG: CPT | Performed by: NURSE PRACTITIONER

## 2023-12-04 PROCEDURE — 3074F SYST BP LT 130 MM HG: CPT | Performed by: NURSE PRACTITIONER

## 2023-12-04 RX ORDER — ATORVASTATIN CALCIUM 20 MG/1
20 TABLET, FILM COATED ORAL DAILY
Qty: 90 TABLET | Refills: 3 | Status: SHIPPED | OUTPATIENT
Start: 2023-12-04

## 2023-12-04 RX ORDER — TAMSULOSIN HYDROCHLORIDE 0.4 MG/1
0.4 CAPSULE ORAL DAILY
COMMUNITY
Start: 2023-11-29

## 2023-12-04 ASSESSMENT — ENCOUNTER SYMPTOMS
MYALGIAS: 0
CLAUDICATION: 0
ORTHOPNEA: 0
SHORTNESS OF BREATH: 0
COUGH: 0
PALPITATIONS: 0
FEVER: 0
PND: 0
DIZZINESS: 0
ABDOMINAL PAIN: 0

## 2023-12-04 NOTE — PROGRESS NOTES
Chief Complaint   Patient presents with    Coronary Artery Disease     F/V Dx: Coronary artery disease involving native coronary artery of native heart without angina pectoris    Dyslipidemia       Subjective     Kaleb Yanes is a 73 y.o. male who presents today for follow up on his CAD, DLD.    Previous patient of Dr. Alvarez. He was last seen in clinic on 10/13/2022 with Dr. Alvarez. No changes  were made during that visit.    He reports he ran out of atorvastatin for a couple of weeks.    Patient feels well, denies chest pain, shortness of breath, palpitations, dizziness/lightheadedness, orthopnea, PND or Edema.      He mentions he may have had a recent lipid panel drawn but he is not sure.    Patient reports he does exercise regularly, he walks 3-1/2 to 4 miles most days a week and also does some weight training.  He does ride a bike on occasion.    Patient had a stent placed in 2007, has a history of hypertension and Dyslipidemia.    Past Medical History:   Diagnosis Date    CAD (coronary artery disease)     Hyperlipidemia     Hypertension      Past Surgical History:   Procedure Laterality Date    ZZZ CARDIAC CATH  07    BMS to Circ    OTHER CARDIAC SURGERY      stent x 1 aug 07     Family History   Problem Relation Age of Onset    Heart Failure Neg Hx     Heart Disease Neg Hx      Social History     Socioeconomic History    Marital status:      Spouse name: Not on file    Number of children: Not on file    Years of education: Not on file    Highest education level: Not on file   Occupational History    Not on file   Tobacco Use    Smoking status: Former     Current packs/day: 0.00     Types: Cigarettes     Start date: 1977     Quit date: 2007     Years since quittin.3    Smokeless tobacco: Never    Tobacco comments:     quit aug 15 07   Substance and Sexual Activity    Alcohol use: Yes     Comment: occasional    Drug use: Yes     Types: Marijuana, Oral     Comment:  "eddiles THC for sleep    Sexual activity: Not on file   Other Topics Concern    Not on file   Social History Narrative    Not on file     Social Determinants of Health     Financial Resource Strain: Not on file   Food Insecurity: Not on file   Transportation Needs: Not on file   Physical Activity: Not on file   Stress: Not on file   Social Connections: Not on file   Intimate Partner Violence: Not on file   Housing Stability: Not on file     Allergies   Allergen Reactions    Nkda [No Known Drug Allergy]      Outpatient Encounter Medications as of 12/4/2023   Medication Sig Dispense Refill    tamsulosin (FLOMAX) 0.4 MG capsule Take 0.4 mg by mouth every day.      atorvastatin (LIPITOR) 20 MG Tab Take 1 Tablet by mouth every day. 90 Tablet 3    ASPIRIN 81 MG PO TABS 81 mg by Oral route QDAILY (RT).       [DISCONTINUED] atorvastatin (LIPITOR) 20 MG Tab Take 1 Tablet by mouth every day. 90 Tablet 7     No facility-administered encounter medications on file as of 12/4/2023.     Review of Systems   Constitutional:  Negative for fever and malaise/fatigue.   Respiratory:  Negative for cough and shortness of breath.    Cardiovascular:  Negative for chest pain, palpitations, orthopnea, claudication, leg swelling and PND.   Gastrointestinal:  Negative for abdominal pain.   Musculoskeletal:  Negative for myalgias.   Neurological:  Negative for dizziness.   All other systems reviewed and are negative.             Objective     /84 (BP Location: Left arm, Patient Position: Sitting, BP Cuff Size: Adult)   Pulse 88   Resp 16   Ht 1.702 m (5' 7\")   Wt 64 kg (141 lb)   SpO2 99%   BMI 22.08 kg/m²     Physical Exam  Vitals reviewed.   Constitutional:       Appearance: He is well-developed.   HENT:      Head: Normocephalic and atraumatic.   Eyes:      Pupils: Pupils are equal, round, and reactive to light.   Neck:      Vascular: No JVD.   Cardiovascular:      Rate and Rhythm: Normal rate and regular rhythm.      Heart sounds: " Normal heart sounds.   Pulmonary:      Effort: Pulmonary effort is normal. No respiratory distress.      Breath sounds: Normal breath sounds. No wheezing or rales.   Abdominal:      General: Bowel sounds are normal.      Palpations: Abdomen is soft.   Musculoskeletal:         General: Normal range of motion.      Cervical back: Normal range of motion and neck supple.      Right lower leg: No edema.      Left lower leg: No edema.   Skin:     General: Skin is warm and dry.   Neurological:      General: No focal deficit present.      Mental Status: He is alert and oriented to person, place, and time.   Psychiatric:         Behavior: Behavior normal.       Lab Results   Component Value Date/Time    CHOLSTRLTOT 128 08/17/2022 08:44 AM    LDL 66 08/17/2022 08:44 AM    HDL 53 08/17/2022 08:44 AM    TRIGLYCERIDE 45 08/17/2022 08:44 AM       Lab Results   Component Value Date/Time    SODIUM 139 08/17/2022 08:44 AM    POTASSIUM 4.1 08/17/2022 08:44 AM    CHLORIDE 105 08/17/2022 08:44 AM    CO2 22 08/17/2022 08:44 AM    GLUCOSE 100 (H) 08/17/2022 08:44 AM    BUN 21 08/17/2022 08:44 AM    CREATININE 0.92 08/17/2022 08:44 AM    CREATININE 1.0 08/18/2007 05:50 AM     Lab Results   Component Value Date/Time    ALKPHOSPHAT 70 08/17/2022 08:44 AM    ASTSGOT 21 08/17/2022 08:44 AM    ALTSGPT 14 08/17/2022 08:44 AM    TBILIRUBIN 0.5 08/17/2022 08:44 AM      Echocardiogram 2/10/2014   Normal left ventricular size, thickness, systolic function, and   diastolic function. Left ventricular ejection fraction is 60% to 65%.   Trace mitral regurgitation.   Aortic valve probably tricuspid. No stenosis or regurgitation seen. AV MG 2 mmHg, PG 4 mmHg.   Trace tricuspid regurgitation.  Unable to estimate pulmonary artery   pressure due to an inadequate tricuspid regurgitant jet.   No pericardial effusion seen.   Ascending aorta 2.9 cm. Normal aortic root 3.4 cm.   No prior study for comparison.      Myocardial Perfusion Report 8/8/2016   NUCLEAR  IMAGING INTERPRETATION   Small fixed defect of severely decreased uptake in the apical inferior wall consistent with scar.  No evidence of ischemia.   Apical inferior hypokinesis.  LV ejection fraction = 58%.   ECG INTERPRETATION   Negative stress ECG for ischemia.      Assessment & Plan     1. Coronary artery disease involving native coronary artery of native heart without angina pectoris  atorvastatin (LIPITOR) 20 MG Tab    Comp Metabolic Panel    Lipid Profile      2. S/P coronary artery stent placement  atorvastatin (LIPITOR) 20 MG Tab    Comp Metabolic Panel    Lipid Profile      3. Dyslipidemia  atorvastatin (LIPITOR) 20 MG Tab    Comp Metabolic Panel    Lipid Profile          Medical Decision Making: Today's Assessment/Status/Plan:        CAD, Hx Stent in 2007, DLD:  -Last LDL 66 on 8/17/2022, goal < 70.   -Continue aspirin 81 mg daily, he reports having stomach irritation when he takes a daily  -Restart atorvastatin 20 mg daily  -We will attempt to obtain lipid panel from [facility], if not, recommend patient to repeat a CMP and lipid panel this month  -BP is stable    FU in clinic in 1 year to establish with general cardiologist. Sooner if needed.    Patient verbalizes understanding and agrees with the plan of care.     PLEASE NOTE: This Note was created using voice recognition Software. I have made every reasonable attempt to correct obvious errors, but I expect that there are errors of grammar and possibly content that I did not discover before finalizing the note

## 2024-01-08 ENCOUNTER — TELEPHONE (OUTPATIENT)
Dept: CARDIOLOGY | Facility: MEDICAL CENTER | Age: 74
End: 2024-01-08

## 2024-01-08 NOTE — TELEPHONE ENCOUNTER
Called and spoke with patient.  Patient states he had a biopsy today and was told he has an irregular heart rhythm.  Patient states he does not have any symptoms nor does he have a history of afib.    Patient scheduled an appointment to F/U. Patient states he last saw ROLA, but was told he needs to follow up with gen card.  Patient would like to schedule an appointment with gen card. I offered earlier appointments but due to living out of town patient would like to be seen Thursday.    Discussed signs and symptoms of afib and ER precautions, patient verbalized understanding.

## 2024-01-08 NOTE — TELEPHONE ENCOUNTER
Just FYI since you last saw patient.    Please let me know if you have any further recommendations.

## 2024-01-08 NOTE — TELEPHONE ENCOUNTER
ROLA  Caller: Kaleb Yanes     Topic/issue: Patient had a biopsy the morning of 01/08/2024 and was told he had an irregular heartbeat. Patient was told he would need to speak with a cardiologist and was wondering if he can continue seeing Kiarra Calix or if he needs to get reestablished with another provider. Patient has an appointment scheduled with ROLA on appointment 01/10/2024.    Callback Number: [phone]     Thank you,  Parisa CANTU

## 2024-01-10 ENCOUNTER — TELEPHONE (OUTPATIENT)
Dept: CARDIOLOGY | Facility: MEDICAL CENTER | Age: 74
End: 2024-01-10
Payer: MEDICARE

## 2024-01-10 DIAGNOSIS — Z95.5 S/P CORONARY ARTERY STENT PLACEMENT: ICD-10-CM

## 2024-01-10 DIAGNOSIS — E78.5 DYSLIPIDEMIA: ICD-10-CM

## 2024-01-10 DIAGNOSIS — I25.10 CORONARY ARTERY DISEASE INVOLVING NATIVE CORONARY ARTERY OF NATIVE HEART WITHOUT ANGINA PECTORIS: ICD-10-CM

## 2024-01-10 NOTE — TELEPHONE ENCOUNTER
ROLA    Caller: Kaleb Yanes     Topic/issue: Pt states he was told he had an irregular heart beat and he was not told that before. He started taking a new medication  (Flomax) and wondered if this could be the cause.  He has no symptoms and feels fine    Callback Number: [phone] (home)      Thank You    Jessica BRIDGES

## 2024-01-10 NOTE — TELEPHONE ENCOUNTER
"To ROLA: I spoke with pt and he stated that he was in his doctor's office when he was told that his HR was irregular. He states that, \"they used a device that they placed on my finger\". Pt also mentioned that he just started taking flomax and would like to know if this is something that is causing it and if he needs to stop his medication. Pt denies dizziness, SOB, palpitations and CP. Please advise. Thank you.  "

## 2024-01-15 ENCOUNTER — TELEPHONE (OUTPATIENT)
Dept: CARDIOLOGY | Facility: MEDICAL CENTER | Age: 74
End: 2024-01-15

## 2024-01-15 NOTE — TELEPHONE ENCOUNTER
ROLA    Caller: Kaleb Yanes     Topic/issue: Pt called in regards to wanting an EKG order sent over to the [facility] in [facility], please advise.    Fax:[phone]     Callback Number: [phone] (home)      Thank you,     Tonny LOPEZ

## 2024-03-01 ENCOUNTER — APPOINTMENT (OUTPATIENT)
Dept: RADIOLOGY | Facility: MEDICAL CENTER | Age: 74
DRG: 673 | End: 2024-03-01
Attending: INTERNAL MEDICINE
Payer: MEDICARE

## 2024-03-01 ENCOUNTER — HOSPITAL ENCOUNTER (INPATIENT)
Facility: MEDICAL CENTER | Age: 74
LOS: 13 days | DRG: 673 | End: 2024-03-14
Attending: STUDENT IN AN ORGANIZED HEALTH CARE EDUCATION/TRAINING PROGRAM | Admitting: INTERNAL MEDICINE
Payer: MEDICARE

## 2024-03-01 DIAGNOSIS — I63.9 OCCIPITAL STROKE (HCC): ICD-10-CM

## 2024-03-01 DIAGNOSIS — Z99.2 ACUTE HEMODIALYSIS PATIENT (HCC): ICD-10-CM

## 2024-03-01 DIAGNOSIS — I15.1 HYPERTENSION SECONDARY TO OTHER RENAL DISORDERS: ICD-10-CM

## 2024-03-01 DIAGNOSIS — Z95.5 S/P CORONARY ARTERY STENT PLACEMENT: ICD-10-CM

## 2024-03-01 DIAGNOSIS — N17.9 AKI (ACUTE KIDNEY INJURY) (HCC): ICD-10-CM

## 2024-03-01 PROBLEM — E87.5 HYPERKALEMIA: Status: ACTIVE | Noted: 2024-03-01

## 2024-03-01 PROBLEM — C77.2 PROSTATE CANCER METASTATIC TO INTRAABDOMINAL LYMPH NODE (HCC): Status: ACTIVE | Noted: 2024-03-01

## 2024-03-01 PROBLEM — R31.0 GROSS HEMATURIA: Status: ACTIVE | Noted: 2024-03-01

## 2024-03-01 PROBLEM — N13.2 HYDRONEPHROSIS WITH URINARY OBSTRUCTION DUE TO RENAL CALCULUS: Status: ACTIVE | Noted: 2024-03-01

## 2024-03-01 PROBLEM — C61 PROSTATE CANCER METASTATIC TO INTRAABDOMINAL LYMPH NODE (HCC): Status: ACTIVE | Noted: 2024-03-01

## 2024-03-01 LAB
ANION GAP SERPL CALC-SCNC: 16 MMOL/L (ref 7–16)
BUN SERPL-MCNC: 53 MG/DL (ref 8–22)
CALCIUM SERPL-MCNC: 8.9 MG/DL (ref 8.5–10.5)
CHLORIDE SERPL-SCNC: 103 MMOL/L (ref 96–112)
CO2 SERPL-SCNC: 20 MMOL/L (ref 20–33)
CREAT SERPL-MCNC: 4.92 MG/DL (ref 0.5–1.4)
EXTRA TUBE BLU BLU: NORMAL
GFR SERPLBLD CREATININE-BSD FMLA CKD-EPI: 12 ML/MIN/1.73 M 2
GLUCOSE SERPL-MCNC: 97 MG/DL (ref 65–99)
POTASSIUM SERPL-SCNC: 5.4 MMOL/L (ref 3.6–5.5)
SODIUM SERPL-SCNC: 139 MMOL/L (ref 135–145)

## 2024-03-01 PROCEDURE — 770006 HCHG ROOM/CARE - MED/SURG/GYN SEMI*

## 2024-03-01 PROCEDURE — 700102 HCHG RX REV CODE 250 W/ 637 OVERRIDE(OP): Performed by: INTERNAL MEDICINE

## 2024-03-01 PROCEDURE — 700111 HCHG RX REV CODE 636 W/ 250 OVERRIDE (IP)

## 2024-03-01 PROCEDURE — 700111 HCHG RX REV CODE 636 W/ 250 OVERRIDE (IP): Performed by: NURSE PRACTITIONER

## 2024-03-01 PROCEDURE — 78708 K FLOW/FUNCT IMAGE W/DRUG: CPT

## 2024-03-01 PROCEDURE — 700105 HCHG RX REV CODE 258: Performed by: INTERNAL MEDICINE

## 2024-03-01 PROCEDURE — 99223 1ST HOSP IP/OBS HIGH 75: CPT | Mod: AI | Performed by: INTERNAL MEDICINE

## 2024-03-01 PROCEDURE — A9270 NON-COVERED ITEM OR SERVICE: HCPCS | Performed by: INTERNAL MEDICINE

## 2024-03-01 PROCEDURE — 80048 BASIC METABOLIC PNL TOTAL CA: CPT

## 2024-03-01 PROCEDURE — 36415 COLL VENOUS BLD VENIPUNCTURE: CPT

## 2024-03-01 RX ORDER — ONDANSETRON 4 MG/1
4 TABLET, ORALLY DISINTEGRATING ORAL EVERY 4 HOURS PRN
Status: DISCONTINUED | OUTPATIENT
Start: 2024-03-01 | End: 2024-03-09

## 2024-03-01 RX ORDER — MORPHINE SULFATE 4 MG/ML
4 INJECTION INTRAVENOUS
Status: DISCONTINUED | OUTPATIENT
Start: 2024-03-01 | End: 2024-03-01

## 2024-03-01 RX ORDER — LABETALOL HYDROCHLORIDE 5 MG/ML
10 INJECTION, SOLUTION INTRAVENOUS EVERY 4 HOURS PRN
Status: DISCONTINUED | OUTPATIENT
Start: 2024-03-01 | End: 2024-03-06

## 2024-03-01 RX ORDER — ATORVASTATIN CALCIUM 20 MG/1
20 TABLET, FILM COATED ORAL EVERY EVENING
Status: DISCONTINUED | OUTPATIENT
Start: 2024-03-01 | End: 2024-03-07

## 2024-03-01 RX ORDER — TRAMADOL HYDROCHLORIDE 50 MG/1
50 TABLET ORAL EVERY 6 HOURS PRN
Status: DISCONTINUED | OUTPATIENT
Start: 2024-03-01 | End: 2024-03-02

## 2024-03-01 RX ORDER — HYDROMORPHONE HYDROCHLORIDE 1 MG/ML
0.5 INJECTION, SOLUTION INTRAMUSCULAR; INTRAVENOUS; SUBCUTANEOUS ONCE
Status: COMPLETED | OUTPATIENT
Start: 2024-03-01 | End: 2024-03-01

## 2024-03-01 RX ORDER — TAMSULOSIN HYDROCHLORIDE 0.4 MG/1
0.4 CAPSULE ORAL DAILY
Status: DISCONTINUED | OUTPATIENT
Start: 2024-03-01 | End: 2024-03-14 | Stop reason: HOSPADM

## 2024-03-01 RX ORDER — ACETAMINOPHEN 500 MG
1000 TABLET ORAL EVERY 8 HOURS
Status: DISCONTINUED | OUTPATIENT
Start: 2024-03-01 | End: 2024-03-07

## 2024-03-01 RX ORDER — MENTHOL AND METHYL SALICYLATE 7.6; 29 G/100G; G/100G
OINTMENT TOPICAL PRN
Status: DISCONTINUED | OUTPATIENT
Start: 2024-03-01 | End: 2024-03-14 | Stop reason: HOSPADM

## 2024-03-01 RX ORDER — ONDANSETRON 2 MG/ML
4 INJECTION INTRAMUSCULAR; INTRAVENOUS EVERY 4 HOURS PRN
Status: DISCONTINUED | OUTPATIENT
Start: 2024-03-01 | End: 2024-03-09

## 2024-03-01 RX ORDER — TRAMADOL HYDROCHLORIDE 50 MG/1
100 TABLET ORAL EVERY 6 HOURS PRN
Status: DISCONTINUED | OUTPATIENT
Start: 2024-03-01 | End: 2024-03-01

## 2024-03-01 RX ORDER — ACETAMINOPHEN 325 MG/1
650 TABLET ORAL EVERY 6 HOURS PRN
Status: DISCONTINUED | OUTPATIENT
Start: 2024-03-01 | End: 2024-03-01

## 2024-03-01 RX ORDER — FUROSEMIDE 10 MG/ML
INJECTION INTRAMUSCULAR; INTRAVENOUS
Status: COMPLETED
Start: 2024-03-01 | End: 2024-03-01

## 2024-03-01 RX ORDER — SODIUM CHLORIDE 9 MG/ML
INJECTION, SOLUTION INTRAVENOUS CONTINUOUS
Status: DISCONTINUED | OUTPATIENT
Start: 2024-03-01 | End: 2024-03-04

## 2024-03-01 RX ADMIN — TRAMADOL HYDROCHLORIDE 50 MG: 50 TABLET ORAL at 17:47

## 2024-03-01 RX ADMIN — HYDROMORPHONE HYDROCHLORIDE 0.5 MG: 1 INJECTION, SOLUTION INTRAMUSCULAR; INTRAVENOUS; SUBCUTANEOUS at 19:50

## 2024-03-01 RX ADMIN — FUROSEMIDE 20 MG: 10 INJECTION INTRAMUSCULAR; INTRAVENOUS at 15:15

## 2024-03-01 RX ADMIN — SODIUM CHLORIDE: 9 INJECTION, SOLUTION INTRAVENOUS at 10:32

## 2024-03-01 RX ADMIN — TAMSULOSIN HYDROCHLORIDE 0.4 MG: 0.4 CAPSULE ORAL at 10:36

## 2024-03-01 RX ADMIN — ATORVASTATIN CALCIUM 20 MG: 20 TABLET, FILM COATED ORAL at 17:44

## 2024-03-01 RX ADMIN — FENTANYL CITRATE 25 MCG: 50 INJECTION, SOLUTION INTRAMUSCULAR; INTRAVENOUS at 22:24

## 2024-03-01 RX ADMIN — TRAMADOL HYDROCHLORIDE 50 MG: 50 TABLET ORAL at 23:53

## 2024-03-01 RX ADMIN — ACETAMINOPHEN 1000 MG: 500 TABLET ORAL at 13:37

## 2024-03-01 RX ADMIN — ACETAMINOPHEN 1000 MG: 500 TABLET ORAL at 23:54

## 2024-03-01 ASSESSMENT — ENCOUNTER SYMPTOMS
FEVER: 0
PALPITATIONS: 0
WEIGHT LOSS: 1
DIAPHORESIS: 0
HEADACHES: 0
CHILLS: 0
WEAKNESS: 0
SHORTNESS OF BREATH: 0
NERVOUS/ANXIOUS: 0
SORE THROAT: 0
COUGH: 0
ABDOMINAL PAIN: 1
BACK PAIN: 1
DIZZINESS: 0

## 2024-03-01 ASSESSMENT — COGNITIVE AND FUNCTIONAL STATUS - GENERAL
SUGGESTED CMS G CODE MODIFIER DAILY ACTIVITY: CH
MOBILITY SCORE: 24
SUGGESTED CMS G CODE MODIFIER MOBILITY: CH
DAILY ACTIVITIY SCORE: 24

## 2024-03-01 ASSESSMENT — LIFESTYLE VARIABLES
AVERAGE NUMBER OF DAYS PER WEEK YOU HAVE A DRINK CONTAINING ALCOHOL: 0
EVER HAD A DRINK FIRST THING IN THE MORNING TO STEADY YOUR NERVES TO GET RID OF A HANGOVER: NO
TOTAL SCORE: 0
EVER FELT BAD OR GUILTY ABOUT YOUR DRINKING: NO
ALCOHOL_USE: NO
HAVE YOU EVER FELT YOU SHOULD CUT DOWN ON YOUR DRINKING: NO
DOES PATIENT WANT TO STOP DRINKING: NO
TOTAL SCORE: 0
HAVE PEOPLE ANNOYED YOU BY CRITICIZING YOUR DRINKING: NO
CONSUMPTION TOTAL: NEGATIVE
TOTAL SCORE: 0
ON A TYPICAL DAY WHEN YOU DRINK ALCOHOL HOW MANY DRINKS DO YOU HAVE: 0
HOW MANY TIMES IN THE PAST YEAR HAVE YOU HAD 5 OR MORE DRINKS IN A DAY: 0

## 2024-03-01 ASSESSMENT — PATIENT HEALTH QUESTIONNAIRE - PHQ9
1. LITTLE INTEREST OR PLEASURE IN DOING THINGS: NOT AT ALL
SUM OF ALL RESPONSES TO PHQ9 QUESTIONS 1 AND 2: 0
1. LITTLE INTEREST OR PLEASURE IN DOING THINGS: NOT AT ALL
2. FEELING DOWN, DEPRESSED, IRRITABLE, OR HOPELESS: NOT AT ALL
2. FEELING DOWN, DEPRESSED, IRRITABLE, OR HOPELESS: NOT AT ALL
SUM OF ALL RESPONSES TO PHQ9 QUESTIONS 1 AND 2: 0

## 2024-03-01 ASSESSMENT — PAIN DESCRIPTION - PAIN TYPE: TYPE: ACUTE PAIN

## 2024-03-01 NOTE — ASSESSMENT & PLAN NOTE
Likely 2/2 obstruction from obstructing stone and suspected intrinsic renal disease contributing, had abnormal mag scan - nephrostomy tube in place  See above  Renal function worse overnight, see above  Continue bicarb drip  Nephrology following  Following closely  Avoid nephrotoxins  Continue iv fluids  Will check bmp in am

## 2024-03-01 NOTE — CARE PLAN
The patient is Watcher - Medium risk of patient condition declining or worsening    Shift Goals  Clinical Goals: ambulation, safety  Patient Goals: to go home, rest and comfort  Family Goals: go home    Progress made toward(s) clinical / shift goals:  Pt ambulated to bathroom and throughout the unit. No mobilization devices needed, pt had steady gait and understands education on fall precautions.    Patient is not progressing towards the following goals:NA

## 2024-03-01 NOTE — PROGRESS NOTES
[facility] DIRECT ADMISSION REPORT  Transferring facility: [facility]  Transferring physician: Dr. Vázquez    Chief complaint: Hematuria  Pertinent history & patient course:   73-year-old male with a PMH of prostate cancer following with urology [place],.  Of coronary artery disease s/p stent patient has had been having flank pain for the past week or so for which he has been taking ibuprofen, yesterday he noted bloody urine which was new so he presented to the ED.  No fever/chills.  Vitals on presentation afebrile, hemodynamically stable.  Labs showed a creatinine of 7, labs at the outside facility less than 2 weeks ago showed a creatinine of 1.37.  Potassium was 5.4.  A CT scan without contrast showed right hydronephrosis similar to prior CT scan from 09/2023.  Mild left hydronephrosis thought to be related to stricture secondary to lymphadenopathy.  Lymphadenopathy in the iliac chain, inferior retroperitoneal space, periaortic space.  A CT with contrast was recommended.    CT scan 09/2023 showed right hydronephrosis and there was concern for a 4 mm right-sided kidney stone, no kidney stones were seen on CT scan today.    No nephrology at outside facility.    The admitting provider is the point of contact for questions or concerns regarding patient's care.

## 2024-03-01 NOTE — ASSESSMENT & PLAN NOTE
Vs stricture  See above  Johansen and nephrostomy R tube in place  Urology s/o  IR following  Will repeat US renal today. F/u results      Now with left-sided hydronephrosis status post nephrostomy tube placement    3/13/2024  Plan to follow up with outpatient urology

## 2024-03-01 NOTE — PROGRESS NOTES
Pt arrived on unit via transport as direct admit.    Assessment completed.   Pt is A&Ox4 , vital signs are stable on RA.  Denies pain, no apparent signs of discomfort.   Bed is in low and locked position, call light and belongings in reach, reminded to use call light, bed alarm on.   No needs at this time.

## 2024-03-01 NOTE — H&P
Hospital Medicine History & Physical Note    Date of Service  3/1/2024    Primary Care Physician  Pcp Pt States None    Consultants  urology    Specialist Names: Scott Lakhani MD    Code Status  No Order    Chief Complaint  No chief complaint on file.      History of Presenting Illness  Kaleb Yanes is a 73 y.o. male who presented 3/1/2024 with acute renal failure due to ureteral obstruction.  The patient lives in [facility] had been having increased left-sided flank pain and back pain for which she has been taking ibuprofen this morning he woke up and had 3 episodes of hematuria and went to the emergency department where he was found to have acute renal failure and hyperkalemia 2 weeks ago his creatinine was 1.37 and this morning it was 7, potassium was 5.4.    Patient states he initially attributed his flank pain and back pain to his bony metastases from his prostate cancer.  He would not have gone into the emergency room except for the hematuria, after the initial 3 episodes however his hematuria has now resolved, he denies any fever, chills, dysuria he thinks he has been making a normal amount of urine.  His appetite has been poor since beginning of the year but his family has been through a lot of stress and he thinks that his appetite is starting to improve he has not had any nausea or vomiting he does not describe anything that sounds like renal colic.    I discussed the plan of care with patient, family, and urology.    Review of Systems  Review of Systems   Constitutional:  Positive for weight loss. Negative for chills, diaphoresis, fever and malaise/fatigue.   HENT:  Negative for congestion and sore throat.    Respiratory:  Negative for cough and shortness of breath.    Cardiovascular:  Negative for chest pain and palpitations.   Gastrointestinal:  Positive for abdominal pain.   Genitourinary:  Positive for frequency and hematuria.   Musculoskeletal:  Positive for back pain.   Skin:   Negative for itching and rash.   Neurological:  Negative for dizziness, weakness and headaches.   Psychiatric/Behavioral:  The patient is not nervous/anxious.        Past Medical History   has a past medical history of CAD (coronary artery disease), Hyperlipidemia, and Hypertension.  Metastatic prostate cancer.  On hormone blocking medication, due for next injection in March.    Surgical History   has a past surgical history that includes other cardiac surgery and zzz cardiac cath (07).     Family History  Father had heart disease, and borderline diabetes.  Family history reviewed with patient. There is no family history that is pertinent to the chief complaint.     Social History   reports that he quit smoking about 16 years ago. His smoking use included cigarettes. He started smoking about 46 years ago. He has never used smokeless tobacco. He reports current alcohol use. He reports current drug use. Drugs: Marijuana and Oral.  The patient still smokes 2 to 3 cigarettes/day, he drinks alcohol rarely maybe once a month, he uses cannabis edibles for sleep.  He lives with his spouse, they have 1 child in [place] and 1 in [place].  The last 2 months have been challenging because their son-in-law  suddenly in January and after this his wife was in the hospital for 5 days with pneumonia and COPD.      Allergies  Allergies   Allergen Reactions    Nkda [No Known Drug Allergy]        Medications  Prior to Admission Medications   Prescriptions Last Dose Informant Patient Reported? Taking?   ASPIRIN 81 MG PO TABS 2024 at 1800  Yes Yes   Si mg by Oral route QDAILY (RT).    atorvastatin (LIPITOR) 20 MG Tab 2024 at 1800  No Yes   Sig: Take 1 Tablet by mouth every day.   tamsulosin (FLOMAX) 0.4 MG capsule 2024 at 1800  Yes Yes   Sig: Take 0.4 mg by mouth every day.      Facility-Administered Medications: None       Physical Exam  Temp:  [36.6 °C (97.9 °F)] 36.6 °C (97.9 °F)  Pulse:  [76] 76  Resp:  [18]  "18  BP: (157)/(89) 157/89  SpO2:  [99 %] 99 %  Blood Pressure : (!) 157/89   Temperature: 36.6 °C (97.9 °F)   Pulse: 76   Respiration: 18   Pulse Oximetry: 99 %       Physical Exam  Constitutional:       General: He is not in acute distress.     Appearance: He is not ill-appearing, toxic-appearing or diaphoretic.   HENT:      Head: Normocephalic and atraumatic.      Nose: Nose normal.      Mouth/Throat:      Mouth: Mucous membranes are moist.   Eyes:      Extraocular Movements: Extraocular movements intact.      Pupils: Pupils are equal, round, and reactive to light.   Cardiovascular:      Rate and Rhythm: Normal rate and regular rhythm.   Pulmonary:      Effort: Pulmonary effort is normal.      Breath sounds: Normal breath sounds.   Abdominal:      General: Bowel sounds are normal. There is no distension.      Palpations: Abdomen is soft.      Tenderness: There is no abdominal tenderness.   Musculoskeletal:         General: No swelling or tenderness.      Right lower leg: No edema.      Left lower leg: No edema.      Comments: ? Muscle atrophy legs   Skin:     General: Skin is warm and dry.   Neurological:      General: No focal deficit present.      Mental Status: He is alert and oriented to person, place, and time.      Cranial Nerves: No cranial nerve deficit.      Motor: No weakness.   Psychiatric:         Mood and Affect: Mood normal.         Behavior: Behavior normal.         Laboratory:          No results for input(s): \"ALTSGPT\", \"ASTSGOT\", \"ALKPHOSPHAT\", \"TBILIRUBIN\", \"DBILIRUBIN\", \"GAMMAGT\", \"AMYLASE\", \"LIPASE\", \"ALB\", \"PREALBUMIN\", \"GLUCOSE\" in the last 72 hours.      No results for input(s): \"NTPROBNP\" in the last 72 hours.      No results for input(s): \"TROPONINT\" in the last 72 hours.    Imaging:  OUTSIDE IMAGES-CT ABDOMEN /PELVIS   Final Result          no X-Ray or EKG requiring interpretation  Reviewed report    Assessment/Plan:  Justification for Admission Status  I anticipate this patient will " require at least two midnights for appropriate medical management, necessitating inpatient admission because ARF and Hyperkalemia 2/2 renal obstruction    Patient will need a Med/Surg bed on MEDICAL service .  The need is secondary to need for surgical or IR intervention.    * Acute renal failure (ARF) (HCC)- (present on admission)  Assessment & Plan  Likely 2/2 obstruction    Hydronephrosis with urinary obstruction due to renal calculus- (present on admission)  Assessment & Plan  Vs stricture  Consulted Urology  If unable to relieve with stent, will need nephrostomy(ies)  Urology would like renal scan first, has been ordered, n.p.o. 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

## 2024-03-01 NOTE — ASSESSMENT & PLAN NOTE
& Metastatic to bones  Urology following and managing, also seeing radiation oncology in [place]  Urology has been reconsulted discussed with Dr. Shah    3/13/2024  Discussed with patient and wife.  They will follow up with U[facility] [place] outpatient clinic.

## 2024-03-02 PROBLEM — E87.20 METABOLIC ACIDOSIS: Status: ACTIVE | Noted: 2024-03-02

## 2024-03-02 LAB
ANION GAP SERPL CALC-SCNC: 19 MMOL/L (ref 7–16)
BUN SERPL-MCNC: 45 MG/DL (ref 8–22)
CALCIUM SERPL-MCNC: 8.6 MG/DL (ref 8.5–10.5)
CHLORIDE SERPL-SCNC: 105 MMOL/L (ref 96–112)
CO2 SERPL-SCNC: 15 MMOL/L (ref 20–33)
CREAT SERPL-MCNC: 3.84 MG/DL (ref 0.5–1.4)
GFR SERPLBLD CREATININE-BSD FMLA CKD-EPI: 16 ML/MIN/1.73 M 2
GLUCOSE SERPL-MCNC: 107 MG/DL (ref 65–99)
POTASSIUM SERPL-SCNC: 5 MMOL/L (ref 3.6–5.5)
SODIUM SERPL-SCNC: 139 MMOL/L (ref 135–145)

## 2024-03-02 PROCEDURE — 700102 HCHG RX REV CODE 250 W/ 637 OVERRIDE(OP): Performed by: INTERNAL MEDICINE

## 2024-03-02 PROCEDURE — 700102 HCHG RX REV CODE 250 W/ 637 OVERRIDE(OP): Performed by: HOSPITALIST

## 2024-03-02 PROCEDURE — A9270 NON-COVERED ITEM OR SERVICE: HCPCS | Performed by: HOSPITALIST

## 2024-03-02 PROCEDURE — A9270 NON-COVERED ITEM OR SERVICE: HCPCS | Performed by: INTERNAL MEDICINE

## 2024-03-02 PROCEDURE — 700111 HCHG RX REV CODE 636 W/ 250 OVERRIDE (IP): Mod: JZ | Performed by: NURSE PRACTITIONER

## 2024-03-02 PROCEDURE — A9270 NON-COVERED ITEM OR SERVICE: HCPCS

## 2024-03-02 PROCEDURE — 700105 HCHG RX REV CODE 258: Performed by: INTERNAL MEDICINE

## 2024-03-02 PROCEDURE — 80048 BASIC METABOLIC PNL TOTAL CA: CPT

## 2024-03-02 PROCEDURE — 700102 HCHG RX REV CODE 250 W/ 637 OVERRIDE(OP)

## 2024-03-02 PROCEDURE — 36415 COLL VENOUS BLD VENIPUNCTURE: CPT

## 2024-03-02 PROCEDURE — 99232 SBSQ HOSP IP/OBS MODERATE 35: CPT | Performed by: HOSPITALIST

## 2024-03-02 PROCEDURE — 770006 HCHG ROOM/CARE - MED/SURG/GYN SEMI*

## 2024-03-02 RX ORDER — OXYCODONE HYDROCHLORIDE 5 MG/1
5 TABLET ORAL EVERY 4 HOURS PRN
Status: DISCONTINUED | OUTPATIENT
Start: 2024-03-02 | End: 2024-03-14 | Stop reason: HOSPADM

## 2024-03-02 RX ORDER — MORPHINE SULFATE 4 MG/ML
2 INJECTION INTRAVENOUS EVERY 4 HOURS PRN
Status: DISCONTINUED | OUTPATIENT
Start: 2024-03-02 | End: 2024-03-14 | Stop reason: HOSPADM

## 2024-03-02 RX ORDER — LIDOCAINE 4 G/G
1 PATCH TOPICAL DAILY
Status: DISCONTINUED | OUTPATIENT
Start: 2024-03-02 | End: 2024-03-14 | Stop reason: HOSPADM

## 2024-03-02 RX ORDER — POLYETHYLENE GLYCOL 3350 17 G/17G
1 POWDER, FOR SOLUTION ORAL
Status: DISCONTINUED | OUTPATIENT
Start: 2024-03-02 | End: 2024-03-14 | Stop reason: HOSPADM

## 2024-03-02 RX ORDER — AMOXICILLIN 250 MG
2 CAPSULE ORAL EVERY EVENING
Status: DISCONTINUED | OUTPATIENT
Start: 2024-03-02 | End: 2024-03-14 | Stop reason: HOSPADM

## 2024-03-02 RX ORDER — SODIUM BICARBONATE 650 MG/1
325 TABLET ORAL 2 TIMES DAILY
Status: DISCONTINUED | OUTPATIENT
Start: 2024-03-02 | End: 2024-03-04

## 2024-03-02 RX ADMIN — FENTANYL CITRATE 25 MCG: 50 INJECTION, SOLUTION INTRAMUSCULAR; INTRAVENOUS at 01:48

## 2024-03-02 RX ADMIN — OXYCODONE 5 MG: 5 TABLET ORAL at 11:49

## 2024-03-02 RX ADMIN — SODIUM CHLORIDE: 9 INJECTION, SOLUTION INTRAVENOUS at 15:11

## 2024-03-02 RX ADMIN — ATORVASTATIN CALCIUM 20 MG: 20 TABLET, FILM COATED ORAL at 16:39

## 2024-03-02 RX ADMIN — TRAMADOL HYDROCHLORIDE 50 MG: 50 TABLET ORAL at 06:05

## 2024-03-02 RX ADMIN — TAMSULOSIN HYDROCHLORIDE 0.4 MG: 0.4 CAPSULE ORAL at 06:05

## 2024-03-02 RX ADMIN — DOCUSATE SODIUM 50 MG AND SENNOSIDES 8.6 MG 2 TABLET: 8.6; 5 TABLET, FILM COATED ORAL at 17:26

## 2024-03-02 RX ADMIN — SODIUM BICARBONATE 325 MG: 650 TABLET ORAL at 17:26

## 2024-03-02 RX ADMIN — ACETAMINOPHEN 1000 MG: 500 TABLET ORAL at 16:38

## 2024-03-02 ASSESSMENT — PATIENT HEALTH QUESTIONNAIRE - PHQ9
2. FEELING DOWN, DEPRESSED, IRRITABLE, OR HOPELESS: NOT AT ALL
1. LITTLE INTEREST OR PLEASURE IN DOING THINGS: NOT AT ALL
SUM OF ALL RESPONSES TO PHQ9 QUESTIONS 1 AND 2: 0

## 2024-03-02 ASSESSMENT — ENCOUNTER SYMPTOMS
NAUSEA: 0
PSYCHIATRIC NEGATIVE: 1
FOCAL WEAKNESS: 0
NEUROLOGICAL NEGATIVE: 1
GASTROINTESTINAL NEGATIVE: 1
FLANK PAIN: 1
EYES NEGATIVE: 1
DIAPHORESIS: 0
WEAKNESS: 0
SORE THROAT: 0
ABDOMINAL PAIN: 0
PALPITATIONS: 0
WEIGHT LOSS: 0
SHORTNESS OF BREATH: 0
CARDIOVASCULAR NEGATIVE: 1
RESPIRATORY NEGATIVE: 1
MYALGIAS: 0
BLURRED VISION: 0
VOMITING: 0
FEVER: 0
DEPRESSION: 0
COUGH: 0
HEADACHES: 0
BRUISES/BLEEDS EASILY: 0
DIZZINESS: 0
CHILLS: 0

## 2024-03-02 ASSESSMENT — PAIN DESCRIPTION - PAIN TYPE
TYPE: ACUTE PAIN
TYPE: ACUTE PAIN

## 2024-03-02 ASSESSMENT — LIFESTYLE VARIABLES: SUBSTANCE_ABUSE: 0

## 2024-03-02 NOTE — DIETARY
"Nutrition services: Day 1 of admit.  Kaleb Yanes is a 73 y.o. male with admitting DX of acute renal failure.     Consult received for unintentional weight loss of 2-13 lbs in 1 month (MST 2).     Assessment:  Height: 170.2 cm (5' 7\")  Weight: 60.4 kg (133 lb 2.5 oz) via bed scale   Body mass index is 20.86 kg/m²., BMI classification: normal   Diet/Intake: NPO    Evaluation:   Pt with reported weight loss. Limited weight history per chart review. Weight is down 8 lbs (5.7%) in the last ~15 months. No acute weight loss concerns at this time.  Current clinical picture and MD progress notes reviewed. Pt admitted with acute renal failure d/t ureteral obstruction. Hx of prostate cancer metastatic to intraabdominal lymph node and bones.   Per MD note reported poor appetite since the beginning of the year however now starting to improve. Consider addition of nutritional supplement to help bolster nutrition while in acute care when diet advanced per MD   Labs and Meds reviewed   Skin: no pressure injuries or edema noted   +BM 2/29    Malnutrition Risk: unable to meet criteria per ASPEN guidelines at this time     Recommendations/Plan:  Diet advancements past NPO as medically feasible per MD    Encourage intake of all meals once diet advanced  Consider addition of nutritional supplement  Monitor weight.    RD to monitor per department policy         "

## 2024-03-02 NOTE — PROGRESS NOTES
Pt states tramadol is not helping with pain. MD notified 1x dose dilaudid ordered and given at 1950. Pt states that this also did not help with his pain. MD notified and prn fentanyl ordered and given at 2224.

## 2024-03-02 NOTE — PROGRESS NOTES
4 Eyes Skin Assessment Completed by SANTA Mitchell and SANTA Koroma.    Head WDL  Ears WDL  Nose WDL  Mouth WDL  Neck WDL  Breast/Chest WDL  Shoulder Blades WDL  Spine Redness scattered redness  (R) Arm/Elbow/Hand WDL  (L) Arm/Elbow/Hand WDL  Abdomen WDL  Groin WDL  Scrotum/Coccyx/Buttocks WDL  (R) Leg WDL  (L) Leg WDL  (R) Heel/Foot/Toe WDL  (L) Heel/Foot/Toe WDL    Interventions In Place N/A    Possible Skin Injury No    Pictures Uploaded Into Epic Yes  Wound Consult Placed N/A  RN Wound Prevention Protocol Ordered No

## 2024-03-02 NOTE — PROGRESS NOTES
[facility] Medicine Daily Progress Note    Date of Service  3/2/2024    Chief Complaint  Kaleb Yanes is a 73 y.o. male admitted 3/1/2024 with flank pain    Hospital Course  Kaleb Yanes is a 73 y.o. male who presented 3/1/2024 with acute renal failure due to ureteral obstruction.  The patient lives in [facility] had been having increased left-sided flank pain and back pain for which she has been taking ibuprofen this morning he woke up and had 3 episodes of hematuria and went to the emergency department where he was found to have acute renal failure and hyperkalemia 2 weeks ago his creatinine was 1.37 and this morning it was 7, potassium was 5.4.     Patient states he initially attributed his flank pain and back pain to his bony metastases from his prostate cancer.  He would not have gone into the emergency room except for the hematuria, after the initial 3 episodes however his hematuria has now resolved, he denies any fever, chills, dysuria he thinks he has been making a normal amount of urine.  His appetite has been poor since beginning of the year but his family has been through a lot of stress and he thinks that his appetite is starting to improve he has not had any nausea or vomiting he does not describe anything that sounds like renal colic.    Interval Problem Update  Axox3, wife and son at bedside. He reports L flank pain has improved, down to 2/10 but not resolved, denies sob, does report some intermittent mild hematuria. No abdominal or chest pain. DBP on high side, ROS otherwise negative. Discussed with Urology who is coming to see pt soon    I have discussed this patient's plan of care and discharge plan at IDT rounds today with Case Management, Nursing, Nursing leadership, and other members of the IDT team.    Consultants/Specialty  Urology Nv    Code Status  DNAR/DNI    Disposition  The patient is not medically cleared for discharge to home or a post-acute facility.      I  have placed the appropriate orders for post-discharge needs.    Review of Systems  Review of Systems   Constitutional:  Positive for malaise/fatigue. Negative for chills, diaphoresis, fever and weight loss.   HENT: Negative.  Negative for sore throat.    Eyes: Negative.  Negative for blurred vision.   Respiratory: Negative.  Negative for cough and shortness of breath.    Cardiovascular: Negative.  Negative for chest pain, palpitations and leg swelling.   Gastrointestinal: Negative.  Negative for abdominal pain, nausea and vomiting.   Genitourinary:  Positive for flank pain. Negative for dysuria.   Musculoskeletal:  Negative for myalgias.   Skin: Negative.  Negative for itching and rash.   Neurological: Negative.  Negative for dizziness, focal weakness, weakness and headaches.   Endo/Heme/Allergies: Negative.  Does not bruise/bleed easily.   Psychiatric/Behavioral: Negative.  Negative for depression, substance abuse and suicidal ideas.    All other systems reviewed and are negative.       Physical Exam  Temp:  [36.1 °C (96.9 °F)-36.9 °C (98.4 °F)] 36.1 °C (96.9 °F)  Pulse:  [80-86] 85  Resp:  [18] 18  BP: (143-167)/(91-99) 143/91  SpO2:  [95 %-97 %] 97 %    Physical Exam  Vitals and nursing note reviewed. Exam conducted with a chaperone present.   Constitutional:       General: He is not in acute distress.     Appearance: Normal appearance. He is not diaphoretic.   HENT:      Head: Normocephalic.      Nose: Nose normal.      Mouth/Throat:      Mouth: Mucous membranes are moist.   Eyes:      Pupils: Pupils are equal, round, and reactive to light.   Cardiovascular:      Rate and Rhythm: Normal rate and regular rhythm.      Pulses: Normal pulses.      Heart sounds: Normal heart sounds.   Pulmonary:      Effort: Pulmonary effort is normal.      Breath sounds: Normal breath sounds.   Abdominal:      General: Abdomen is flat. Bowel sounds are normal.      Palpations: Abdomen is soft.      Tenderness: There is left CVA  tenderness (mild).   Musculoskeletal:         General: No swelling or deformity. Normal range of motion.   Skin:     General: Skin is warm and dry.      Capillary Refill: Capillary refill takes less than 2 seconds.   Neurological:      General: No focal deficit present.      Mental Status: He is alert and oriented to person, place, and time.      Cranial Nerves: No cranial nerve deficit.   Psychiatric:         Mood and Affect: Mood normal.         Behavior: Behavior normal.         Fluids    Intake/Output Summary (Last 24 hours) at 3/2/2024 1519  Last data filed at 3/2/2024 0909  Gross per 24 hour   Intake 240 ml   Output 1050 ml   Net -810 ml       Laboratory      Recent Labs     03/01/24  1007 03/02/24  0231   SODIUM 139 139   POTASSIUM 5.4 5.0   CHLORIDE 103 105   CO2 20 15*   GLUCOSE 97 107*   BUN 53* 45*   CREATININE 4.92* 3.84*   CALCIUM 8.9 8.6                   Imaging  NM-RENAL WITH DIURETIC WASHOUT   Final Result      1. Normal renal flow and function scan of the left kidney.   2. Decreased uptake and excretion of the radiotracer by the right kidney. Findings suggest chronic right hydroureteronephrosis at the distal ureteral level with resultant right renal cortical atrophy.      OUTSIDE IMAGES-CT ABDOMEN /PELVIS   Final Result           Assessment/Plan  * Acute renal failure (ARF) (HCC)- (present on admission)  Assessment & Plan  Likely 2/2 obstruction from obstructing stone  Some improvement overnight  Discussed with urology who is coming to see him  Following  Continue iv fluids    Metabolic acidosis  Assessment & Plan  Related to renal failure, will add oral bicarb  Following  Bmp in am    Hydronephrosis with urinary obstruction due to renal calculus- (present on admission)  Assessment & Plan  Vs stricture  Consulted Urology, they are coming to eval soon  If unable to relieve with stent, will need nephrostomy(ies)      Gross hematuria- (present on admission)  Assessment & Plan  Resolved  Holding  ASA  Mech DVT prophyalxis  Cbc in am    Hyperkalemia- (present on admission)  Assessment & Plan  2/2 NEREIDA (5.4 @ outside hospital)   Resolved  Following  Bmp in am    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 In review of yesterday's note (3/1/2024), there are no changes except as documented above.

## 2024-03-02 NOTE — CARE PLAN
Problem: Knowledge Deficit - Standard  Goal: Patient and family/care givers will demonstrate understanding of plan of care, disease process/condition, diagnostic tests and medications  Outcome: Progressing     Problem: Urinary Elimination  Goal: Establish and maintain regular urinary output  Outcome: Progressing     Problem: Mobility  Goal: Patient's capacity to carry out activities will improve  Outcome: Progressing     Problem: Self Care  Goal: Patient will have the ability to perform ADLs independently or with assistance (bathe, groom, dress, toilet and feed)  Outcome: Progressing     Problem: Pain - Standard  Goal: Alleviation of pain or a reduction in pain to the patient’s comfort goal  Outcome: Progressing   The patient is Stable - Low risk of patient condition declining or worsening    Shift Goals  Clinical Goals: ambulation, safety  Patient Goals: to go home, rest and comfort  Family Goals: go home    Progress made toward(s) clinical / shift goals:  pt progressin toward goals    Patient is not progressing towards the following goals:

## 2024-03-03 PROBLEM — K59.00 CONSTIPATION: Status: ACTIVE | Noted: 2024-03-03

## 2024-03-03 LAB
ANION GAP SERPL CALC-SCNC: 16 MMOL/L (ref 7–16)
BASOPHILS # BLD AUTO: 0.4 % (ref 0–1.8)
BASOPHILS # BLD: 0.04 K/UL (ref 0–0.12)
BUN SERPL-MCNC: 47 MG/DL (ref 8–22)
CALCIUM SERPL-MCNC: 8.4 MG/DL (ref 8.5–10.5)
CHLORIDE SERPL-SCNC: 106 MMOL/L (ref 96–112)
CO2 SERPL-SCNC: 16 MMOL/L (ref 20–33)
CREAT SERPL-MCNC: 4 MG/DL (ref 0.5–1.4)
EOSINOPHIL # BLD AUTO: 0.17 K/UL (ref 0–0.51)
EOSINOPHIL NFR BLD: 1.6 % (ref 0–6.9)
ERYTHROCYTE [DISTWIDTH] IN BLOOD BY AUTOMATED COUNT: 43.8 FL (ref 35.9–50)
GFR SERPLBLD CREATININE-BSD FMLA CKD-EPI: 15 ML/MIN/1.73 M 2
GLUCOSE SERPL-MCNC: 79 MG/DL (ref 65–99)
HCT VFR BLD AUTO: 38.8 % (ref 42–52)
HGB BLD-MCNC: 12.8 G/DL (ref 14–18)
IMM GRANULOCYTES # BLD AUTO: 0.05 K/UL (ref 0–0.11)
IMM GRANULOCYTES NFR BLD AUTO: 0.5 % (ref 0–0.9)
INR PPP: 1.19 (ref 0.87–1.13)
LYMPHOCYTES # BLD AUTO: 1.26 K/UL (ref 1–4.8)
LYMPHOCYTES NFR BLD: 12 % (ref 22–41)
MCH RBC QN AUTO: 30 PG (ref 27–33)
MCHC RBC AUTO-ENTMCNC: 33 G/DL (ref 32.3–36.5)
MCV RBC AUTO: 90.9 FL (ref 81.4–97.8)
MONOCYTES # BLD AUTO: 1.12 K/UL (ref 0–0.85)
MONOCYTES NFR BLD AUTO: 10.6 % (ref 0–13.4)
NEUTROPHILS # BLD AUTO: 7.88 K/UL (ref 1.82–7.42)
NEUTROPHILS NFR BLD: 74.9 % (ref 44–72)
NRBC # BLD AUTO: 0 K/UL
NRBC BLD-RTO: 0 /100 WBC (ref 0–0.2)
PLATELET # BLD AUTO: 284 K/UL (ref 164–446)
PMV BLD AUTO: 9.8 FL (ref 9–12.9)
POTASSIUM SERPL-SCNC: 4.9 MMOL/L (ref 3.6–5.5)
PROTHROMBIN TIME: 15.2 SEC (ref 12–14.6)
RBC # BLD AUTO: 4.27 M/UL (ref 4.7–6.1)
SODIUM SERPL-SCNC: 138 MMOL/L (ref 135–145)
WBC # BLD AUTO: 10.5 K/UL (ref 4.8–10.8)

## 2024-03-03 PROCEDURE — 99232 SBSQ HOSP IP/OBS MODERATE 35: CPT | Performed by: HOSPITALIST

## 2024-03-03 PROCEDURE — 770006 HCHG ROOM/CARE - MED/SURG/GYN SEMI*

## 2024-03-03 PROCEDURE — 700102 HCHG RX REV CODE 250 W/ 637 OVERRIDE(OP): Performed by: INTERNAL MEDICINE

## 2024-03-03 PROCEDURE — 80048 BASIC METABOLIC PNL TOTAL CA: CPT

## 2024-03-03 PROCEDURE — A9270 NON-COVERED ITEM OR SERVICE: HCPCS

## 2024-03-03 PROCEDURE — A9270 NON-COVERED ITEM OR SERVICE: HCPCS | Performed by: INTERNAL MEDICINE

## 2024-03-03 PROCEDURE — 36415 COLL VENOUS BLD VENIPUNCTURE: CPT

## 2024-03-03 PROCEDURE — A9270 NON-COVERED ITEM OR SERVICE: HCPCS | Performed by: HOSPITALIST

## 2024-03-03 PROCEDURE — 85025 COMPLETE CBC W/AUTO DIFF WBC: CPT

## 2024-03-03 PROCEDURE — 85610 PROTHROMBIN TIME: CPT

## 2024-03-03 PROCEDURE — 700102 HCHG RX REV CODE 250 W/ 637 OVERRIDE(OP)

## 2024-03-03 PROCEDURE — 700102 HCHG RX REV CODE 250 W/ 637 OVERRIDE(OP): Performed by: HOSPITALIST

## 2024-03-03 RX ORDER — BISACODYL 10 MG
10 SUPPOSITORY, RECTAL RECTAL DAILY
Status: DISCONTINUED | OUTPATIENT
Start: 2024-03-03 | End: 2024-03-07

## 2024-03-03 RX ADMIN — POLYETHYLENE GLYCOL 3350 1 PACKET: 17 POWDER, FOR SOLUTION ORAL at 02:44

## 2024-03-03 RX ADMIN — BISACODYL 10 MG: 10 SUPPOSITORY RECTAL at 09:29

## 2024-03-03 RX ADMIN — SODIUM BICARBONATE 325 MG: 650 TABLET ORAL at 04:21

## 2024-03-03 RX ADMIN — SODIUM BICARBONATE 325 MG: 650 TABLET ORAL at 18:58

## 2024-03-03 RX ADMIN — TAMSULOSIN HYDROCHLORIDE 0.4 MG: 0.4 CAPSULE ORAL at 04:21

## 2024-03-03 RX ADMIN — OXYCODONE 5 MG: 5 TABLET ORAL at 00:16

## 2024-03-03 RX ADMIN — ATORVASTATIN CALCIUM 20 MG: 20 TABLET, FILM COATED ORAL at 18:58

## 2024-03-03 RX ADMIN — OXYCODONE 5 MG: 5 TABLET ORAL at 05:52

## 2024-03-03 RX ADMIN — ACETAMINOPHEN 1000 MG: 500 TABLET ORAL at 00:03

## 2024-03-03 RX ADMIN — ACETAMINOPHEN 1000 MG: 500 TABLET ORAL at 13:56

## 2024-03-03 ASSESSMENT — ENCOUNTER SYMPTOMS
DEPRESSION: 0
BRUISES/BLEEDS EASILY: 0
PALPITATIONS: 0
RESPIRATORY NEGATIVE: 1
NAUSEA: 0
FOCAL WEAKNESS: 0
HEADACHES: 0
PSYCHIATRIC NEGATIVE: 1
ABDOMINAL PAIN: 1
DIZZINESS: 0
EYES NEGATIVE: 1
FEVER: 0
FLANK PAIN: 1
WEAKNESS: 0
SORE THROAT: 0
NEUROLOGICAL NEGATIVE: 1
COUGH: 0
ABDOMINAL PAIN: 0
SPEECH CHANGE: 0
SHORTNESS OF BREATH: 0
CHILLS: 0
VOMITING: 0
CONSTIPATION: 1
BLURRED VISION: 0
WEIGHT LOSS: 0
MYALGIAS: 1
CARDIOVASCULAR NEGATIVE: 1
MYALGIAS: 0
DIAPHORESIS: 0

## 2024-03-03 ASSESSMENT — PAIN DESCRIPTION - PAIN TYPE
TYPE: ACUTE PAIN

## 2024-03-03 ASSESSMENT — PATIENT HEALTH QUESTIONNAIRE - PHQ9
2. FEELING DOWN, DEPRESSED, IRRITABLE, OR HOPELESS: NOT AT ALL
SUM OF ALL RESPONSES TO PHQ9 QUESTIONS 1 AND 2: 0
1. LITTLE INTEREST OR PLEASURE IN DOING THINGS: NOT AT ALL

## 2024-03-03 ASSESSMENT — LIFESTYLE VARIABLES: SUBSTANCE_ABUSE: 0

## 2024-03-03 NOTE — PROGRESS NOTES
[facility] Medicine Daily Progress Note    Date of Service  3/3/2024    Chief Complaint  Kaleb Yanes is a 73 y.o. male admitted 3/1/2024 with flank pain    Hospital Course  Kaleb Yanes is a 73 y.o. male who presented 3/1/2024 with acute renal failure due to ureteral obstruction.  The patient lives in [facility] had been having increased left-sided flank pain and back pain for which she has been taking ibuprofen this morning he woke up and had 3 episodes of hematuria and went to the emergency department where he was found to have acute renal failure and hyperkalemia 2 weeks ago his creatinine was 1.37 and on day of admission it was 7, potassium was 5.4.     Patient stated he initially attributed his flank pain and back pain to his bony metastases from his prostate cancer.  He would not have gone into the emergency room except for the hematuria, after the initial 3 episodes however his hematuria has now resolved, he denies any fever, chills, dysuria he thinks he has been making a normal amount of urine.  His appetite has been poor since beginning of the year but his family has been through a lot of stress and he thinks that his appetite is starting to improve he has not had any nausea or vomiting he does not describe anything that sounds like renal colic.    Interval Problem Update  Axox3, he reports ongoing Left sided flank pain increased today to 4/10, he denies R flank pain, + flatus, no bm in three days, agrees to suppository and miralax, discussed with nursing. He denies dysuria and no further hematuria. No chest pain. I discussed plan with Elaine Lewis from urology who will arrange for a nephrostomy tube on the R. Johansen will also be placed to assist with decompression. Patient agrees to this plan. He is ambulating frequently, BP elevated, he declined the iv meds this am, bp now improving - will continue to follow. ROS otherwise negative    I have discussed this patient's plan of  care and discharge plan at IDT rounds today with Case Management, Nursing, Nursing leadership, and other members of the IDT team.    Consultants/Specialty  Urology Nv    Code Status  DNAR/DNI    Disposition  The patient is not medically cleared for discharge to home or a post-acute facility.      I have placed the appropriate orders for post-discharge needs.    Review of Systems  Review of Systems   Constitutional:  Positive for malaise/fatigue. Negative for chills, diaphoresis, fever and weight loss.   HENT: Negative.  Negative for sore throat.    Eyes: Negative.  Negative for blurred vision.   Respiratory: Negative.  Negative for cough and shortness of breath.    Cardiovascular: Negative.  Negative for chest pain, palpitations and leg swelling.   Gastrointestinal:  Positive for constipation. Negative for abdominal pain, nausea and vomiting.   Genitourinary:  Positive for flank pain. Negative for dysuria.   Musculoskeletal:  Negative for myalgias.   Skin: Negative.  Negative for itching and rash.   Neurological: Negative.  Negative for dizziness, focal weakness, weakness and headaches.   Endo/Heme/Allergies: Negative.  Does not bruise/bleed easily.   Psychiatric/Behavioral: Negative.  Negative for depression, substance abuse and suicidal ideas.    All other systems reviewed and are negative.       Physical Exam  Temp:  [36.2 °C (97.1 °F)-36.8 °C (98.3 °F)] 36.4 °C (97.6 °F)  Pulse:  [] 99  Resp:  [18-20] 18  BP: (152-180)/(82-94) 180/84  SpO2:  [96 %-99 %] 97 %    Physical Exam  Vitals and nursing note reviewed. Exam conducted with a chaperone present.   Constitutional:       General: He is not in acute distress.     Appearance: Normal appearance. He is not diaphoretic.   HENT:      Head: Normocephalic.      Nose: Nose normal.      Mouth/Throat:      Mouth: Mucous membranes are moist.   Eyes:      Pupils: Pupils are equal, round, and reactive to light.   Cardiovascular:      Rate and Rhythm: Normal rate and  regular rhythm.      Pulses: Normal pulses.      Heart sounds: Normal heart sounds.   Pulmonary:      Effort: Pulmonary effort is normal.      Breath sounds: Normal breath sounds.   Abdominal:      General: Abdomen is flat. Bowel sounds are normal.      Palpations: Abdomen is soft.      Tenderness: There is left CVA tenderness (mild).   Musculoskeletal:         General: No swelling or deformity. Normal range of motion.   Skin:     General: Skin is warm and dry.      Capillary Refill: Capillary refill takes less than 2 seconds.   Neurological:      General: No focal deficit present.      Mental Status: He is alert and oriented to person, place, and time.      Cranial Nerves: No cranial nerve deficit.   Psychiatric:         Mood and Affect: Mood normal.         Behavior: Behavior normal.         Fluids    Intake/Output Summary (Last 24 hours) at 3/3/2024 1418  Last data filed at 3/3/2024 0653  Gross per 24 hour   Intake 120 ml   Output 800 ml   Net -680 ml       Laboratory  Recent Labs     03/03/24  0204   WBC 10.5   RBC 4.27*   HEMOGLOBIN 12.8*   HEMATOCRIT 38.8*   MCV 90.9   MCH 30.0   MCHC 33.0   RDW 43.8   PLATELETCT 284   MPV 9.8     Recent Labs     03/01/24  1007 03/02/24  0231 03/03/24  0204   SODIUM 139 139 138   POTASSIUM 5.4 5.0 4.9   CHLORIDE 103 105 106   CO2 20 15* 16*   GLUCOSE 97 107* 79   BUN 53* 45* 47*   CREATININE 4.92* 3.84* 4.00*   CALCIUM 8.9 8.6 8.4*     Recent Labs     03/03/24  1051   INR 1.19*               Imaging  NM-RENAL WITH DIURETIC WASHOUT   Final Result      1. Normal renal flow and function scan of the left kidney.   2. Decreased uptake and excretion of the radiotracer by the right kidney. Findings suggest chronic right hydroureteronephrosis at the distal ureteral level with resultant right renal cortical atrophy.      OUTSIDE IMAGES-CT ABDOMEN /PELVIS   Final Result      IR-PERQ NEPH CATH NEW ACCESS (ALL RADIOLOGY) RIGHT    (Results Pending)        Assessment/Plan  * Acute renal  failure (ARF) (HCC)- (present on admission)  Assessment & Plan  Likely 2/2 obstruction from obstructing stone  Mild increase of bun and creatinine overnight  Discussed with urology, plan to place arreola and IR to place R nephrostomy tube  Following closely  Repeat bmp in am  Avoid nephrotoxins  Continue iv fluids    Constipation  Assessment & Plan  + flatus, no abdominal pain  Advancing bowel regimen, discussed with pt and nursing  following    Metabolic acidosis  Assessment & Plan  Related to renal failure, continue oral bicarb  Some improvement  Following  Will check bmp in am    Hydronephrosis with urinary obstruction due to renal calculus- (present on admission)  Assessment & Plan  Vs stricture  See above  Will place arreola and IR to place R  nephrostomy tube  Following        Gross hematuria- (present on admission)  Assessment & Plan  Resolved  Holding ASA  Mech DVT prophyalxis  Will check cbc in am    Hyperkalemia- (present on admission)  Assessment & Plan  2/2 NEREIDA (5.4 @ outside hospital)   Resolved  Following  Bmp in am    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 In review of yesterday's note (3/2/2024), there are no changes except as documented above.

## 2024-03-03 NOTE — CARE PLAN
Problem: Knowledge Deficit - Standard  Goal: Patient and family/care givers will demonstrate understanding of plan of care, disease process/condition, diagnostic tests and medications  Outcome: Progressing     Problem: Urinary Elimination  Goal: Establish and maintain regular urinary output  Outcome: Progressing     Problem: Mobility  Goal: Patient's capacity to carry out activities will improve  Outcome: Progressing     Problem: Self Care  Goal: Patient will have the ability to perform ADLs independently or with assistance (bathe, groom, dress, toilet and feed)  Outcome: Progressing     Problem: Pain - Standard  Goal: Alleviation of pain or a reduction in pain to the patient’s comfort goal  Outcome: Progressing   The patient is Stable - Low risk of patient condition declining or worsening    Shift Goals  Clinical Goals: Pt will ambulate safely, free of falls, throughout shift.  Patient Goals: go homee, pain management.  Family Goals: go home    Progress made toward(s) clinical / shift goals:  pt progressing toward goals    Patient is not progressing towards the following goals:

## 2024-03-03 NOTE — CONSULTS
Consult/H&P Note    Primary Service: Hospital Medicine  Attending: Valerie Walls M.D.  Patient's Name/MRN: Kaleb Yanes, [id number]    Admit Date:3/1/2024  Today's Date: 3/2/2024   Length of stay:  LOS: 1 day   Room #: S626/02      Reason for consult/chief complaint: Acute Kidney Injury  ID/HPI: Kaleb Yanes is a 73 y.o. male patient known to our practice, being followed by Dr John with history of metastatic prostate cancer, started on Firmagon a month ago. He presented to OSH with gross hematuria that started 2 days ago and LLQ abdominal pain.  In the ER, he was found to have a Cr 7 and CT that shows pelvic lymphadenopathy with possible small R ureteral stone.  He denies right flank pain.  He was transferred to [facility] for higher level of care admitted to the hospital service for possible surgical vs IR intervention and urology was consulted.       Past Medical History:   Past Medical History:   Diagnosis Date    CAD (coronary artery disease)     Hyperlipidemia     Hypertension         Past Surgical History:   Past Surgical History:   Procedure Laterality Date    ZZZ CARDIAC CATH  07    BMS to Circ    OTHER CARDIAC SURGERY      stent x 1 aug 07        Family History:   Family History   Problem Relation Age of Onset    Heart Failure Neg Hx     Heart Disease Neg Hx          Social History:   Social History     Tobacco Use    Smoking status: Former     Current packs/day: 0.00     Types: Cigarettes     Start date: 1977     Quit date: 2007     Years since quittin.5    Smokeless tobacco: Never    Tobacco comments:     quit aug 15 07   Substance Use Topics    Alcohol use: Yes     Comment: occasional    Drug use: Yes     Types: Marijuana, Oral     Comment: eddiles THC for sleep      Social History     Social History Narrative    Not on file        Allergies: he Nkda [no known drug allergy]    Medications:   Medications Prior to Admission   Medication Sig Dispense Refill Last  "Dose    tamsulosin (FLOMAX) 0.4 MG capsule Take 0.4 mg by mouth every day.   2/29/2024 at 1800    atorvastatin (LIPITOR) 20 MG Tab Take 1 Tablet by mouth every day. 90 Tablet 3 2/29/2024 at 1800    ASPIRIN 81 MG PO TABS 81 mg by Oral route QDAILY (RT).    2/29/2024 at 1800         Review of Systems  Review of Systems   All other systems reviewed and are negative.       Physical Exam  VITAL SIGNS: BP (!) 157/94   Pulse 100   Temp 36.8 °C (98.3 °F) (Temporal)   Resp 18   Ht 1.702 m (5' 7\")   Wt 60.4 kg (133 lb 2.5 oz)   SpO2 99%   BMI 20.86 kg/m²   Physical Exam  Vitals and nursing note reviewed.   Constitutional:       Appearance: Normal appearance. He is normal weight.   HENT:      Head: Normocephalic and atraumatic.   Pulmonary:      Effort: Pulmonary effort is normal.   Abdominal:      General: Abdomen is flat. There is no distension.      Palpations: Abdomen is soft.      Tenderness: There is no abdominal tenderness. There is no guarding.   Musculoskeletal:         General: Normal range of motion.      Cervical back: Normal range of motion and neck supple.   Skin:     General: Skin is warm and dry.   Neurological:      General: No focal deficit present.      Mental Status: He is alert and oriented to person, place, and time.   Psychiatric:         Mood and Affect: Mood normal.         Behavior: Behavior normal.           Labs:      Recent Labs     03/01/24  1007 03/02/24  0231   SODIUM 139 139   POTASSIUM 5.4 5.0   CHLORIDE 103 105   CO2 20 15*   GLUCOSE 97 107*   BUN 53* 45*   CREATININE 4.92* 3.84*   CALCIUM 8.9 8.6         Glucose:  Recent Labs     03/01/24  1007 03/02/24  0231   GLUCOSE 97 107*     Coags:  No results for input(s): \"INR\" in the last 72 hours.      Urinalysis:   No results for input(s): \"COLORURINE\", \"CLARITY\", \"SPECGRAVITY\", \"PHURINE\", \"GLUCOSEUR\", \"KETONES\", \"NITRITE\", \"OCCULTBLOOD\", \"RBCURINE\", \"BACTERIA\", \"EPITHELCELL\" in the last 72 hours.    Invalid input(s): \"BILRUBINUR\", " "\"LEUESTERASE\"    Imaging:  NM-RENAL WITH DIURETIC WASHOUT   Final Result      1. Normal renal flow and function scan of the left kidney.   2. Decreased uptake and excretion of the radiotracer by the right kidney. Findings suggest chronic right hydroureteronephrosis at the distal ureteral level with resultant right renal cortical atrophy.      OUTSIDE IMAGES-CT ABDOMEN /PELVIS   Final Result          @lastct@     Assessment/Recommendation   73 y.o. male with history of metastatic prostate cancer, started on Firmagon a month ago, LLQ abdominal pain and NEREIDA with right hydronephrosis noted on CT done at OSH. We ordered a Mag 3 renal scan that was reviewed by Dr Bridges.  Scan shows 86% fx on L and 14% on right with presumed chronic obstruction of the distal right ureter, no evidence of stones.  With Cr improving, no flank pain and decreased kidney function on right, there is no indication for Neph tube at this time.    Monitor kidney function and avoid nephrotoxins  He will f/u as scheduled for caP treatment in our clinic with repeat labs prior and his treatment with rad onc in [place]  Urology will follow  Plan discussed with pt, wife at bedside and Dr Walls.     Dr Bridges is aware of this consultation and has directed the POC.       Elaine Lewis, P.A.   [address] [place] [address]   [phone]          "

## 2024-03-03 NOTE — CONSULTS
"Radiology Consult  Author: Meryl Sanchez D.N.P. Date & Time created: 3/3/2024  2:20 PM   Date of admission  3/1/2024  Note to reader: this note follows the APSO format rather than the historical SOAP format. Assessment and plan located at the top of the note for ease of use.    Chief Complaint  73 y.o. male admitted 3/1/2024 with acute renal failure due to ureteral obstruction.    HPI  Kaleb Yanes is a 74 yo male with PMH significant for prostate cancer, CAD s/p coronary artery stent placemen (8/2007), HTN, and HLD who presented to OSH with hematuria associated increasing left sided flank and back pain and was diagnosed with acute renal failure. Pt was transported to [facility] for higher level of care. 3/1/24 Lasix renal scan with diuretic washout revealed normal renal flow and function of the left kidney with decreased uptake and excretion by the right kidney. \"Findings suggest chronic right hydroureteronephrosis at the distal ureteral level with resultant right renal cortical atrophy\" (Avinash Crystal MD) 3/3/24 IR consult and treat for right nephrostogram with new nephrostomy tube placement.    3/3/24: [facility] reclining in hospital bed shortly after arreola catheter placement. Pt is holding ice pack to left flank and rates pain as 6/10, exacerbated by arreola placement. Supportive spouse is by his side.      is a candidate for RIGHT nephrostogram with percutaneous nephrostomy placement. We discussed the method of the procedure at length including the possibility of the use of catheters, contrast, and xrays to facilitate diagnostic and therapeutic procedures. We additionally discussed the procedure risks, including bleeding and infection, damage to the kidney, reaction to any medications given during the procedure, hemorrhage, and death.     Patient will need to have ongoing surveillance imaging after the procedure, which will be managed by [facility] and urology, and that future " treatment depends on multiple factors including lab studies, imaging, and performance status. We discussed alternatives to the procedure including surveillance and surgical intervention, which could be discussed with a surgeon.     The patient verbalizes understanding of risks, benefits, and alternatives and elects to proceed. We discussed the need for NPO for at least 6 hours prior to procedure to safely provide moderate sedation. We also discussed the temporary discontinuation of any antiplatelets or anticoagulants to minimize procedural bleeding risk.    Kaleb Yanes is a 73 y.o. male with RIGHT nephrostomy tube planned in [facility] interventional radiology. Post procedurally, this patient will require post procedure nursing interventions and physician evaluation.     I have reviewed this patient's most recent labs including WBC 10.5, Hgb 12.8, , K 4.9, Cr 4.00, INR 1.19. Coordinated IR preprocedure care with interventional radiologist and hospital nursing staff.    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)    Hyperkalemia    Gross hematuria    Hydronephrosis with urinary obstruction due to renal calculus    NEREIDA (acute kidney injury) (HCC)    Metabolic acidosis    Constipation      Plan IR  - Discussed patient's clinical presentation and imaging with IR DR Vu  - PT/INR ordered  - NPO order placed  - Urology following  - Pending IR RIGHT nephrostogram with nephrostomy placement (new access)    Thank you for allowing Interventional Radiology team to participate in the patients care, if any additonal care or requests are needed in the future please do not hesitate call or place IR order      [id number]             Review of Systems  Physical Exam   Review of Systems   Constitutional:  Negative for chills and fever.   Respiratory:  Negative for cough and shortness of breath.    Cardiovascular:   Negative for chest pain and leg swelling.   Gastrointestinal:  Positive for abdominal pain. Negative for nausea and vomiting.   Genitourinary:  Positive for dysuria, flank pain and hematuria.   Musculoskeletal:  Positive for joint pain (attributed to mets of prostate cancer) and myalgias (attributed to mets of prostate cancer).   Neurological:  Negative for speech change, weakness and headaches.   Psychiatric/Behavioral: Negative.          Restless secondary to pain      Vitals:    03/03/24 0715   BP: (!) 180/84   Pulse: 99   Resp: 18   Temp: 36.4 °C (97.6 °F)   SpO2: 97%      Physical Exam  Vitals and nursing note reviewed.   Constitutional:       Appearance: He is normal weight. He is not ill-appearing.   HENT:      Head: Atraumatic.   Cardiovascular:      Rate and Rhythm: Normal rate.      Pulses: Normal pulses.   Pulmonary:      Effort: Pulmonary effort is normal. No respiratory distress.   Abdominal:      Palpations: Abdomen is soft.      Tenderness: There is abdominal tenderness (left flank).   Genitourinary:     Comments: Johansen catheter  Musculoskeletal:         General: Tenderness (extremity bones and joints) present. Normal range of motion.   Skin:     General: Skin is warm and dry.      Capillary Refill: Capillary refill takes less than 2 seconds.   Neurological:      General: No focal deficit present.      Mental Status: He is alert and oriented to person, place, and time.   Psychiatric:         Mood and Affect: Mood normal.         Behavior: Behavior normal.             Labs    Recent Labs     03/03/24  0204   WBC 10.5   RBC 4.27*   HEMOGLOBIN 12.8*   HEMATOCRIT 38.8*   MCV 90.9   MCH 30.0   MCHC 33.0   RDW 43.8   PLATELETCT 284   MPV 9.8     Recent Labs     03/01/24  1007 03/02/24  0231 03/03/24  0204   SODIUM 139 139 138   POTASSIUM 5.4 5.0 4.9   CHLORIDE 103 105 106   CO2 20 15* 16*   GLUCOSE 97 107* 79   BUN 53* 45* 47*   CREATININE 4.92* 3.84* 4.00*   CALCIUM 8.9 8.6 8.4*     NM-RENAL WITH DIURETIC  "WASHOUT   Final Result      1. Normal renal flow and function scan of the left kidney.   2. Decreased uptake and excretion of the radiotracer by the right kidney. Findings suggest chronic right hydroureteronephrosis at the distal ureteral level with resultant right renal cortical atrophy.      OUTSIDE IMAGES-CT ABDOMEN /PELVIS   Final Result      IR-PERQ NEPH CATH NEW ACCESS (ALL RADIOLOGY) RIGHT    (Results Pending)     Recent Labs     03/01/24  1007 03/02/24  0231 03/03/24  0204   SODIUM 139 139 138   POTASSIUM 5.4 5.0 4.9   CHLORIDE 103 105 106   CO2 20 15* 16*   GLUCOSE 97 107* 79   BUN 53* 45* 47*     INR   Date Value Ref Range Status   03/03/2024 1.19 (H) 0.87 - 1.13 Final     Comment:     INR - Non-therapeutic Reference Range: 0.87-1.13  INR - Therapeutic Reference Range: 2.0-4.0       No results found for: \"POCINR\"     Intake/Output Summary (Last 24 hours) at 3/3/2024 1420  Last data filed at 3/3/2024 0653  Gross per 24 hour   Intake 120 ml   Output 800 ml   Net -680 ml      Labs not explicitly included in this progress note were reviewed by the author. Radiology/imaging not explicitly included in this progress note was reviewed by the author.     Past Medical History:   Diagnosis Date    CAD (coronary artery disease)     Hyperlipidemia     Hypertension         Home Medications    Medication Sig Taking? Last Dose Authorizing Provider   tamsulosin (FLOMAX) 0.4 MG capsule Take 0.4 mg by mouth every day. Yes 2/29/2024 at 1800 Physician Outpatient   atorvastatin (LIPITOR) 20 MG Tab Take 1 Tablet by mouth every day. Yes 2/29/2024 at 1800 JUAREZ Crenshaw   ASPIRIN 81 MG PO TABS 81 mg by Oral route QDAILY (RT).  Yes 2/29/2024 at 1800 Physician Outpatient       I have performed a physical exam and reviewed and updated ROS and Plan today (3/3/2024).     59 minutes in directly providing and coordinating care and extensive data review.  No time overlap and excludes procedures.  "

## 2024-03-03 NOTE — PROGRESS NOTES
0700- Pt ambulating around hallway. Reported he needs a suppository for no BM for a few days.  0845 Urology at bedside; order for arreola cath to be placed & NPO to be initiated for possible procedure today. Pt. was explained plan of care by urology.  0930- Suppository given for constipation c/o. Arreola cath ordered; waiting for it to arrive to place.  1020- 16 fr arreola placed; urine flowing clear.  1221 Received message that IR will be placing RIGHT nephrostomy tube tomorrow 3/4/24. Okay to feed now, NPO starting tonight at midnight 2359 & Order to HOLD all blood thinners (will let nurse know).  APRN to come and speak with pt and wife later on today regarding changes/plan of care.  1230- Diet tray ordered; pt ate chicken on tray. Tylenol given for pain/discomfort.   1400- Pt ambulated to bathroom and reports BM. Feels a bit better.  1500-

## 2024-03-03 NOTE — PROGRESS NOTES
"     Urology Progress Note    Patient seen and examined    Overnight Events: None    S: No fevers, chills, nausea or vomiting.  +flatus. No BM for 4 days.  C/o LLQ abdominal pain.  Good UOP.  Cr trending back up.  Denies right flank pain.  CT from OSH reviewed and shows distal right ureteral stone with right hydronephrosis.     O:   BP (!) 180/84   Pulse 99   Temp 36.4 °C (97.6 °F) (Temporal)   Resp 18   Ht 1.702 m (5' 7\")   Wt 60.4 kg (133 lb 2.5 oz)   SpO2 97%   Recent Labs     03/01/24  1007 03/02/24  0231 03/03/24  0204   SODIUM 139 139 138   POTASSIUM 5.4 5.0 4.9   CHLORIDE 103 105 106   CO2 20 15* 16*   GLUCOSE 97 107* 79   BUN 53* 45* 47*   CREATININE 4.92* 3.84* 4.00*   CALCIUM 8.9 8.6 8.4*     Recent Labs     03/03/24  0204   WBC 10.5   RBC 4.27*   HEMOGLOBIN 12.8*   HEMATOCRIT 38.8*   MCV 90.9   MCH 30.0   MCHC 33.0   RDW 43.8   PLATELETCT 284   MPV 9.8         Intake/Output Summary (Last 24 hours) at 3/3/2024 0911  Last data filed at 3/3/2024 0653  Gross per 24 hour   Intake 120 ml   Output 800 ml   Net -680 ml       Exam:  Abdomen soft, benign.           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  We cannot definitively say that the obstructed right kidney is causing the increase in Cr, given the Mag 3 renal scan shows decreased function of that kidney, however we can offer maximal decompression of that kidney by placing a arreola and right neph tube.  If renal function improves with placement of neph tube, then we can plan to treat distal stone at later date as outpatient.  Patient agrees with plan.    - place " 16Fr arreola and monitor UOP  - NPO  - IR to place Right Neph tube  - Repeat am labs  - Continue daily tamsulosin 0.4mg  - Urology will follow    Plan discussed with Dr Walls and Dr Bridges.     Elaine Lewis, P.A.   [address] [place] [address]   [phone]

## 2024-03-03 NOTE — CARE PLAN
The patient is Stable - Low risk of patient condition declining or worsening    Shift Goals  Clinical Goals: Pt will ambulate safely, free of falls, throughout shift.  Patient Goals: go homee, pain management.  Family Goals: go home    Progress made toward(s) clinical / shift goals:      Problem: Knowledge Deficit - Standard  Goal: Patient and family/care givers will demonstrate understanding of plan of care, disease process/condition, diagnostic tests and medications  Outcome: Progressing     Problem: Urinary Elimination  Goal: Establish and maintain regular urinary output  Outcome: Progressing     Problem: Mobility  Goal: Patient's capacity to carry out activities will improve  Outcome: Progressing     Problem: Self Care  Goal: Patient will have the ability to perform ADLs independently or with assistance (bathe, groom, dress, toilet and feed)  Outcome: Progressing

## 2024-03-04 ENCOUNTER — APPOINTMENT (OUTPATIENT)
Dept: RADIOLOGY | Facility: MEDICAL CENTER | Age: 74
DRG: 673 | End: 2024-03-04
Attending: PHYSICIAN ASSISTANT
Payer: MEDICARE

## 2024-03-04 LAB
ALBUMIN SERPL BCP-MCNC: 3.6 G/DL (ref 3.2–4.9)
ALBUMIN/GLOB SERPL: 1 G/DL
ALP SERPL-CCNC: 289 U/L (ref 30–99)
ALT SERPL-CCNC: 14 U/L (ref 2–50)
ANION GAP SERPL CALC-SCNC: 20 MMOL/L (ref 7–16)
APPEARANCE UR: CLEAR
AST SERPL-CCNC: 33 U/L (ref 12–45)
BACTERIA #/AREA URNS HPF: NEGATIVE /HPF
BASOPHILS # BLD AUTO: 0.4 % (ref 0–1.8)
BASOPHILS # BLD: 0.04 K/UL (ref 0–0.12)
BILIRUB SERPL-MCNC: 0.4 MG/DL (ref 0.1–1.5)
BILIRUB UR QL STRIP.AUTO: NEGATIVE
BUN SERPL-MCNC: 51 MG/DL (ref 8–22)
CALCIUM ALBUM COR SERPL-MCNC: 9.1 MG/DL (ref 8.5–10.5)
CALCIUM SERPL-MCNC: 8.8 MG/DL (ref 8.5–10.5)
CHLORIDE SERPL-SCNC: 102 MMOL/L (ref 96–112)
CO2 SERPL-SCNC: 14 MMOL/L (ref 20–33)
COLOR UR: ABNORMAL
CREAT SERPL-MCNC: 5.13 MG/DL (ref 0.5–1.4)
EOSINOPHIL # BLD AUTO: 0.12 K/UL (ref 0–0.51)
EOSINOPHIL NFR BLD: 1.1 % (ref 0–6.9)
EPI CELLS #/AREA URNS HPF: NEGATIVE /HPF
ERYTHROCYTE [DISTWIDTH] IN BLOOD BY AUTOMATED COUNT: 42.4 FL (ref 35.9–50)
GFR SERPLBLD CREATININE-BSD FMLA CKD-EPI: 11 ML/MIN/1.73 M 2
GLOBULIN SER CALC-MCNC: 3.6 G/DL (ref 1.9–3.5)
GLUCOSE SERPL-MCNC: 84 MG/DL (ref 65–99)
GLUCOSE UR STRIP.AUTO-MCNC: NEGATIVE MG/DL
HCT VFR BLD AUTO: 44.3 % (ref 42–52)
HGB BLD-MCNC: 14.7 G/DL (ref 14–18)
HYALINE CASTS #/AREA URNS LPF: ABNORMAL /LPF
IMM GRANULOCYTES # BLD AUTO: 0.04 K/UL (ref 0–0.11)
IMM GRANULOCYTES NFR BLD AUTO: 0.4 % (ref 0–0.9)
KETONES UR STRIP.AUTO-MCNC: NEGATIVE MG/DL
LEUKOCYTE ESTERASE UR QL STRIP.AUTO: NEGATIVE
LYMPHOCYTES # BLD AUTO: 1.29 K/UL (ref 1–4.8)
LYMPHOCYTES NFR BLD: 11.4 % (ref 22–41)
MCH RBC QN AUTO: 29.3 PG (ref 27–33)
MCHC RBC AUTO-ENTMCNC: 33.2 G/DL (ref 32.3–36.5)
MCV RBC AUTO: 88.4 FL (ref 81.4–97.8)
MICRO URNS: ABNORMAL
MONOCYTES # BLD AUTO: 1.12 K/UL (ref 0–0.85)
MONOCYTES NFR BLD AUTO: 9.9 % (ref 0–13.4)
NEUTROPHILS # BLD AUTO: 8.72 K/UL (ref 1.82–7.42)
NEUTROPHILS NFR BLD: 76.8 % (ref 44–72)
NITRITE UR QL STRIP.AUTO: NEGATIVE
NRBC # BLD AUTO: 0 K/UL
NRBC BLD-RTO: 0 /100 WBC (ref 0–0.2)
PH UR STRIP.AUTO: 6 [PH] (ref 5–8)
PLATELET # BLD AUTO: 339 K/UL (ref 164–446)
PMV BLD AUTO: 9.4 FL (ref 9–12.9)
POTASSIUM SERPL-SCNC: 5 MMOL/L (ref 3.6–5.5)
PROT SERPL-MCNC: 7.2 G/DL (ref 6–8.2)
PROT UR QL STRIP: NEGATIVE MG/DL
RBC # BLD AUTO: 5.01 M/UL (ref 4.7–6.1)
RBC # URNS HPF: ABNORMAL /HPF
RBC UR QL AUTO: ABNORMAL
SODIUM SERPL-SCNC: 136 MMOL/L (ref 135–145)
SP GR UR STRIP.AUTO: <=1.005
UROBILINOGEN UR STRIP.AUTO-MCNC: 0.2 MG/DL
WBC # BLD AUTO: 11.3 K/UL (ref 4.8–10.8)
WBC #/AREA URNS HPF: ABNORMAL /HPF

## 2024-03-04 PROCEDURE — 80053 COMPREHEN METABOLIC PANEL: CPT

## 2024-03-04 PROCEDURE — A9270 NON-COVERED ITEM OR SERVICE: HCPCS | Performed by: INTERNAL MEDICINE

## 2024-03-04 PROCEDURE — 85025 COMPLETE CBC W/AUTO DIFF WBC: CPT

## 2024-03-04 PROCEDURE — 700101 HCHG RX REV CODE 250: Performed by: HOSPITALIST

## 2024-03-04 PROCEDURE — 770006 HCHG ROOM/CARE - MED/SURG/GYN SEMI*

## 2024-03-04 PROCEDURE — 700105 HCHG RX REV CODE 258: Performed by: INTERNAL MEDICINE

## 2024-03-04 PROCEDURE — 700111 HCHG RX REV CODE 636 W/ 250 OVERRIDE (IP): Performed by: INTERNAL MEDICINE

## 2024-03-04 PROCEDURE — 0T9330Z DRAINAGE OF RIGHT KIDNEY PELVIS WITH DRAINAGE DEVICE, PERCUTANEOUS APPROACH: ICD-10-PCS | Performed by: RADIOLOGY

## 2024-03-04 PROCEDURE — 700102 HCHG RX REV CODE 250 W/ 637 OVERRIDE(OP): Performed by: INTERNAL MEDICINE

## 2024-03-04 PROCEDURE — 81001 URINALYSIS AUTO W/SCOPE: CPT

## 2024-03-04 PROCEDURE — A9270 NON-COVERED ITEM OR SERVICE: HCPCS | Performed by: HOSPITALIST

## 2024-03-04 PROCEDURE — 700111 HCHG RX REV CODE 636 W/ 250 OVERRIDE (IP): Mod: JZ

## 2024-03-04 PROCEDURE — C1729 CATH, DRAINAGE: HCPCS

## 2024-03-04 PROCEDURE — 99233 SBSQ HOSP IP/OBS HIGH 50: CPT | Performed by: HOSPITALIST

## 2024-03-04 PROCEDURE — 36415 COLL VENOUS BLD VENIPUNCTURE: CPT

## 2024-03-04 PROCEDURE — 700102 HCHG RX REV CODE 250 W/ 637 OVERRIDE(OP): Performed by: HOSPITALIST

## 2024-03-04 RX ORDER — OXYCODONE HYDROCHLORIDE 5 MG/1
2.5 TABLET ORAL
Status: DISPENSED | OUTPATIENT
Start: 2024-03-04 | End: 2024-03-05

## 2024-03-04 RX ORDER — MIDAZOLAM HYDROCHLORIDE 1 MG/ML
INJECTION INTRAMUSCULAR; INTRAVENOUS
Status: COMPLETED
Start: 2024-03-04 | End: 2024-03-04

## 2024-03-04 RX ORDER — SODIUM CHLORIDE 9 MG/ML
500 INJECTION, SOLUTION INTRAVENOUS
Status: ACTIVE | OUTPATIENT
Start: 2024-03-04 | End: 2024-03-04

## 2024-03-04 RX ORDER — MIDAZOLAM HYDROCHLORIDE 1 MG/ML
.5-2 INJECTION INTRAMUSCULAR; INTRAVENOUS PRN
Status: ACTIVE | OUTPATIENT
Start: 2024-03-04 | End: 2024-03-04

## 2024-03-04 RX ORDER — LIDOCAINE HYDROCHLORIDE 20 MG/ML
JELLY TOPICAL
Status: DISCONTINUED | OUTPATIENT
Start: 2024-03-04 | End: 2024-03-14 | Stop reason: HOSPADM

## 2024-03-04 RX ORDER — SODIUM BICARBONATE IN D5W 150/1000ML
PLASTIC BAG, INJECTION (ML) INTRAVENOUS CONTINUOUS
Status: DISCONTINUED | OUTPATIENT
Start: 2024-03-04 | End: 2024-03-05

## 2024-03-04 RX ORDER — ONDANSETRON 2 MG/ML
4 INJECTION INTRAMUSCULAR; INTRAVENOUS PRN
Status: ACTIVE | OUTPATIENT
Start: 2024-03-04 | End: 2024-03-04

## 2024-03-04 RX ORDER — CEFAZOLIN SODIUM 1 G/3ML
INJECTION, POWDER, FOR SOLUTION INTRAMUSCULAR; INTRAVENOUS
Status: COMPLETED
Start: 2024-03-04 | End: 2024-03-04

## 2024-03-04 RX ADMIN — FENTANYL CITRATE 25 MCG: 50 INJECTION, SOLUTION INTRAMUSCULAR; INTRAVENOUS at 09:05

## 2024-03-04 RX ADMIN — ACETAMINOPHEN 1000 MG: 500 TABLET ORAL at 04:36

## 2024-03-04 RX ADMIN — SODIUM BICARBONATE 50 MEQ: 84 INJECTION INTRAVENOUS at 15:40

## 2024-03-04 RX ADMIN — SODIUM BICARBONATE 325 MG: 650 TABLET ORAL at 04:36

## 2024-03-04 RX ADMIN — CEFAZOLIN 2000 MG: 1 INJECTION, POWDER, FOR SOLUTION INTRAMUSCULAR; INTRAVENOUS at 09:01

## 2024-03-04 RX ADMIN — LIDOCAINE 1 PATCH: 4 PATCH TOPICAL at 04:36

## 2024-03-04 RX ADMIN — ACETAMINOPHEN 1000 MG: 500 TABLET ORAL at 13:29

## 2024-03-04 RX ADMIN — MIDAZOLAM HYDROCHLORIDE 2 MG: 1 INJECTION INTRAMUSCULAR; INTRAVENOUS at 09:05

## 2024-03-04 RX ADMIN — ATORVASTATIN CALCIUM 20 MG: 20 TABLET, FILM COATED ORAL at 18:28

## 2024-03-04 RX ADMIN — OXYCODONE 5 MG: 5 TABLET ORAL at 21:39

## 2024-03-04 RX ADMIN — SODIUM BICARBONATE: 84 INJECTION, SOLUTION INTRAVENOUS at 14:22

## 2024-03-04 RX ADMIN — TAMSULOSIN HYDROCHLORIDE 0.4 MG: 0.4 CAPSULE ORAL at 04:36

## 2024-03-04 RX ADMIN — MIDAZOLAM HYDROCHLORIDE 2 MG: 1 INJECTION, SOLUTION INTRAMUSCULAR; INTRAVENOUS at 09:05

## 2024-03-04 ASSESSMENT — ENCOUNTER SYMPTOMS
COUGH: 0
RESPIRATORY NEGATIVE: 1
FEVER: 0
DIAPHORESIS: 0
CONSTIPATION: 0
BRUISES/BLEEDS EASILY: 0
VOMITING: 0
NEUROLOGICAL NEGATIVE: 1
FOCAL WEAKNESS: 0
WEIGHT LOSS: 0
BLURRED VISION: 0
DEPRESSION: 0
PALPITATIONS: 0
PSYCHIATRIC NEGATIVE: 1
CHILLS: 0
WEAKNESS: 0
ABDOMINAL PAIN: 0
SORE THROAT: 0
DIZZINESS: 0
SHORTNESS OF BREATH: 0
MYALGIAS: 0
EYES NEGATIVE: 1
FLANK PAIN: 0
NAUSEA: 0
CARDIOVASCULAR NEGATIVE: 1
HEADACHES: 0

## 2024-03-04 ASSESSMENT — PAIN DESCRIPTION - PAIN TYPE
TYPE: ACUTE PAIN
TYPE: ACUTE PAIN

## 2024-03-04 ASSESSMENT — LIFESTYLE VARIABLES: SUBSTANCE_ABUSE: 0

## 2024-03-04 NOTE — PROGRESS NOTES
"Radiology Progress Note   Author: JUAREZ Villafana Date & Time created: 3/4/2024 0910   Date of admission  3/1/2024  Note to reader: this note follows the APSO format rather than the historical SOAP format. Assessment and plan located at the top of the note for ease of use.    Chief Complaint  73 y.o. male admitted 3/1/2024 with left sided back pain      HPI   Kaleb Yanes is a 72 yo male with PMH significant for prostate cancer, CAD s/p coronary artery stent placemen (8/2007), HTN, and HLD who presented to OSH with hematuria associated increasing left sided flank and back pain and was diagnosed with acute renal failure. Pt was transported to [facility] for higher level of care. 3/1/24 Lasix renal scan with diuretic washout revealed normal renal flow and function of the left kidney with decreased uptake and excretion by the right kidney. \"Findings suggest chronic right hydroureteronephrosis at the distal ureteral level with resultant right renal cortical atrophy\" (Avinash Crystal MD) 3/3/24 IR was consulted and Dr. Waite from IR performed a right nephrostogram with 8 fr nephrostomy tube placement on 03/04/24.     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)    Hyperkalemia    Gross hematuria    Hydronephrosis with urinary obstruction due to renal calculus    NEREIDA (acute kidney injury) (HCC)    Metabolic acidosis    Constipation      Plan IR    -Discussed patient's clinical presentation and imaging with IR DR Vu  - PT/INR ordered  - NPO order placed  - Urology following  - Pending IR RIGHT nephrostogram with nephrostomy placement (new access)  -Thank you for allowing Interventional Radiology team to participate in the patients care, if any additional care or requests are needed " in the future please do not hesitate to call or place IR order   [phone]           Review of Systems  Physical Exam   ROS   Vitals:    03/04/24 1531   BP: (!) 161/94   Pulse: 89   Resp: 18   Temp: 36.4 °C (97.5 °F)   SpO2: 93%        Physical Exam        Labs    Recent Labs     03/03/24 0204 03/04/24  0545   WBC 10.5 11.3*   RBC 4.27* 5.01   HEMOGLOBIN 12.8* 14.7   HEMATOCRIT 38.8* 44.3   MCV 90.9 88.4   MCH 30.0 29.3   MCHC 33.0 33.2   RDW 43.8 42.4   PLATELETCT 284 339   MPV 9.8 9.4     Recent Labs     03/02/24 0231 03/03/24 0204 03/04/24  0545   SODIUM 139 138 136   POTASSIUM 5.0 4.9 5.0   CHLORIDE 105 106 102   CO2 15* 16* 14*   GLUCOSE 107* 79 84   BUN 45* 47* 51*   CREATININE 3.84* 4.00* 5.13*   CALCIUM 8.6 8.4* 8.8     Recent Labs     03/02/24 0231 03/03/24 0204 03/04/24  0545   ALBUMIN  --   --  3.6   TBILIRUBIN  --   --  0.4   ALKPHOSPHAT  --   --  289*   TOTPROTEIN  --   --  7.2   ALTSGPT  --   --  14   ASTSGOT  --   --  33   CREATININE 3.84* 4.00* 5.13*     IR-PERQ NEPH CATH NEW ACCESS (ALL RADIOLOGY) RIGHT   Final Result         1. Ultrasound And Fluoroscopic Guided Placement Of a right sided 8 Ukrainian  Pigtail Locking Loop Percutaneous Nephrostomy Catheter.      2. Obstruction of the distal right ureter is demonstrated with no flow of contrast into the urinary bladder.      2. Nephrostogram Showing Satisfactory Catheter Position Without Extravasation.      NM-RENAL WITH DIURETIC WASHOUT   Final Result      1. Normal renal flow and function scan of the left kidney.   2. Decreased uptake and excretion of the radiotracer by the right kidney. Findings suggest chronic right hydroureteronephrosis at the distal ureteral level with resultant right renal cortical atrophy.      OUTSIDE IMAGES-CT ABDOMEN /PELVIS   Final Result        INR   Date Value Ref Range Status   03/03/2024 1.19 (H) 0.87 - 1.13 Final     Comment:     INR - Non-therapeutic Reference Range: 0.87-1.13  INR - Therapeutic Reference Range:  "2.0-4.0       No results found for: \"POCINR\"     Intake/Output Summary (Last 24 hours) at 3/4/2024 1535  Last data filed at 3/4/2024 0332  Gross per 24 hour   Intake 120 ml   Output 800 ml   Net -680 ml      Labs not explicitly included in this progress note were reviewed by the author. Radiology/imaging not explicitly included in this progress note was reviewed by the author.     I have performed a physical exam and reviewed and updated ROS and Plan today (3/4/2024).            "

## 2024-03-04 NOTE — OR SURGEON
Immediate Post- Operative Note        Findings: 8 Fr right nephrostomy tube placed for right hydronephrosis.       Procedure(s): Right Nephrostomy placement      Estimated Blood Loss: Less than 5 ml      Complications: None            3/4/2024     9:17 AM     Tori Doe M.D.

## 2024-03-04 NOTE — PROGRESS NOTES
Pt presents to IR2.     Kaleb Yanes was consented by Dr. Doe at bedside, confirmed by this RN and consent signed at bedside.     Pt transferred to IR2 table in prone position. Patient underwent a RIGHT nephrostomy tube placement by Dr. Doe. Procedure site was marked by MD and verified using imaging guidance. Pt placed on monitor, prepped and draped in a sterile fashion. Vitals were taken every 5 minutes and remained stable during procedure (see doc flow sheet for results). CO2 waveform capnography was monitored and remained WNL throughout procedure.     Report called to SANTA Rodrigez. Pt transported by stretcher with RN to [id number].     No Specimen    RIGHT Nephrostomy Tube:  Traackr Flexima Regular Nephrostomy Catheter (8F)  REF: [id number]  LOT: [id number]  EXP: 2026-10-26     Sedation Start 0905  Sedation End 0915

## 2024-03-04 NOTE — PROGRESS NOTES
[facility] Medicine Daily Progress Note    Date of Service  3/4/2024    Chief Complaint  Kaleb Yanes is a 73 y.o. male admitted 3/1/2024 with flank pain    Hospital Course  Kaleb Yanes is a 73 y.o. male who presented 3/1/2024 with acute renal failure due to ureteral obstruction.  The patient lives in [facility] had been having increased left-sided flank pain and back pain for which she has been taking ibuprofen this morning he woke up and had 3 episodes of hematuria and went to the emergency department where he was found to have acute renal failure and hyperkalemia 2 weeks ago his creatinine was 1.37 and on day of admission it was 7, potassium was 5.4.     Patient stated he initially attributed his flank pain and back pain to his bony metastases from his prostate cancer.  He would not have gone into the emergency room except for the hematuria, after the initial 3 episodes however his hematuria has now resolved, he denies any fever, chills, dysuria he thinks he has been making a normal amount of urine.  His appetite has been poor since beginning of the year but his family has been through a lot of stress and he thinks that his appetite is starting to improve he has not had any nausea or vomiting he does not describe anything that sounds like renal colic.    Interval Problem Update  Axox3, states he is feeling much better today, had a large bm last evening and with this his abdominal and flank pain resolved. No hematuria, denies sob, denies cp, no dizziness, bp improved. Bicarb decreasing, bun/creat increasing, K wnl, nephrostomy tube placed. I discussed with nephrology who will eval. ROS otherwise negative.     I have discussed this patient's plan of care and discharge plan at IDT rounds today with Case Management, Nursing, Nursing leadership, and other members of the IDT team.    Consultants/Specialty  Urology Nv  Nephrology Robin  IR    Code Status  DNAR/DNI    Disposition  The  patient is not medically cleared for discharge to home or a post-acute facility.      I have placed the appropriate orders for post-discharge needs.    Review of Systems  Review of Systems   Constitutional:  Positive for malaise/fatigue. Negative for chills, diaphoresis, fever and weight loss.   HENT: Negative.  Negative for sore throat.    Eyes: Negative.  Negative for blurred vision.   Respiratory: Negative.  Negative for cough and shortness of breath.    Cardiovascular: Negative.  Negative for chest pain, palpitations and leg swelling.   Gastrointestinal:  Negative for abdominal pain, constipation, nausea and vomiting.   Genitourinary:  Negative for dysuria and flank pain.   Musculoskeletal:  Negative for myalgias.   Skin: Negative.  Negative for itching and rash.   Neurological: Negative.  Negative for dizziness, focal weakness, weakness and headaches.   Endo/Heme/Allergies: Negative.  Does not bruise/bleed easily.   Psychiatric/Behavioral: Negative.  Negative for depression, substance abuse and suicidal ideas.    All other systems reviewed and are negative.       Physical Exam  Temp:  [36.5 °C (97.7 °F)-36.7 °C (98 °F)] 36.6 °C (97.9 °F)  Pulse:  [] 118  Resp:  [18] 18  BP: (146-174)/(87-91) 146/87  SpO2:  [95 %-97 %] 97 %    Physical Exam  Vitals and nursing note reviewed. Exam conducted with a chaperone present.   Constitutional:       General: He is not in acute distress.     Appearance: Normal appearance. He is not diaphoretic.   HENT:      Head: Normocephalic.      Nose: Nose normal.      Mouth/Throat:      Mouth: Mucous membranes are moist.   Eyes:      Pupils: Pupils are equal, round, and reactive to light.   Cardiovascular:      Rate and Rhythm: Normal rate and regular rhythm.      Pulses: Normal pulses.      Heart sounds: Normal heart sounds.   Pulmonary:      Effort: Pulmonary effort is normal.      Breath sounds: Normal breath sounds.   Abdominal:      General: Abdomen is flat. Bowel sounds are  normal.      Palpations: Abdomen is soft.      Tenderness: There is no left CVA tenderness.      Comments: R nephrostomy tube cdi with mild associated hematuria   Genitourinary:     Comments: Johansen in place in addition to nephrostomy tube  Musculoskeletal:         General: No swelling or deformity. Normal range of motion.   Skin:     General: Skin is warm and dry.      Capillary Refill: Capillary refill takes less than 2 seconds.   Neurological:      General: No focal deficit present.      Mental Status: He is alert and oriented to person, place, and time.      Cranial Nerves: No cranial nerve deficit.   Psychiatric:         Mood and Affect: Mood normal.         Behavior: Behavior normal.         Fluids    Intake/Output Summary (Last 24 hours) at 3/4/2024 0759  Last data filed at 3/4/2024 0332  Gross per 24 hour   Intake 120 ml   Output 1400 ml   Net -1280 ml       Laboratory  Recent Labs     03/03/24  0204 03/04/24  0545   WBC 10.5 11.3*   RBC 4.27* 5.01   HEMOGLOBIN 12.8* 14.7   HEMATOCRIT 38.8* 44.3   MCV 90.9 88.4   MCH 30.0 29.3   MCHC 33.0 33.2   RDW 43.8 42.4   PLATELETCT 284 339   MPV 9.8 9.4     Recent Labs     03/02/24  0231 03/03/24  0204 03/04/24  0545   SODIUM 139 138 136   POTASSIUM 5.0 4.9 5.0   CHLORIDE 105 106 102   CO2 15* 16* 14*   GLUCOSE 107* 79 84   BUN 45* 47* 51*   CREATININE 3.84* 4.00* 5.13*   CALCIUM 8.6 8.4* 8.8     Recent Labs     03/03/24  1051   INR 1.19*               Imaging  NM-RENAL WITH DIURETIC WASHOUT   Final Result      1. Normal renal flow and function scan of the left kidney.   2. Decreased uptake and excretion of the radiotracer by the right kidney. Findings suggest chronic right hydroureteronephrosis at the distal ureteral level with resultant right renal cortical atrophy.      OUTSIDE IMAGES-CT ABDOMEN /PELVIS   Final Result      IR-PERQ NEPH CATH NEW ACCESS (ALL RADIOLOGY) RIGHT    (Results Pending)        Assessment/Plan  * Acute renal failure (ARF) (HCC)- (present on  admission)  Assessment & Plan  Likely 2/2 obstruction from obstructing stone  Mild increase of bun and creatinine overnight  Discussed with urology, plan to place arreola and IR to place R nephrostomy tube  Following closely  Repeat bmp in am  Avoid nephrotoxins  Continue iv fluids    Constipation  Assessment & Plan  + flatus, no abdominal pain  Advancing bowel regimen, discussed with pt and nursing  following    Metabolic acidosis  Assessment & Plan  Related to renal failure, continue oral bicarb  Some improvement  Following  Will check bmp in am    Hydronephrosis with urinary obstruction due to renal calculus- (present on admission)  Assessment & Plan  Vs stricture  See above  Will place arreola and IR to place R  nephrostomy tube  Following        Gross hematuria- (present on admission)  Assessment & Plan  Resolved  Holding ASA  Mech DVT prophyalxis  Will check cbc in am    Hyperkalemia- (present on admission)  Assessment & Plan  2/2 NEREIDA (5.4 @ outside hospital)   Resolved  Following  Bmp in am    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 In review of yesterday's note (3/3/2024), there are no changes except as documented above.

## 2024-03-04 NOTE — CARE PLAN
The patient is Stable - Low risk of patient condition declining or worsening    Shift Goals  Clinical Goals: Incentive spirometer use at least twice during every hourly round during this shift.  Patient Goals: Pt to have a BM.  Family Goals: Pt have nephrostomy placed today.    Progress made toward(s) clinical / shift goals:         0700- Pt ambulating around hallway. Reported he needs a suppository for no BM for a few days.  0845 Urology at bedside; order for arreola cath to be placed & NPO to be initiated for possible procedure today. Pt. was explained plan of care by urology.  0930- Suppository given for constipation c/o. Arreola cath ordered; waiting for it to arrive to place.  1020- 16 fr arreola placed; urine flowing clear.  1221 Received message that IR will be placing RIGHT nephrostomy tube tomorrow 3/4/24. Okay to feed now, NPO starting tonight at midnight 2359 & Order to HOLD all blood thinners (will let nurse know).  APRN to come and speak with pt and wife later on today regarding changes/plan of care.  1230- Diet tray ordered; pt ate chicken on tray. Tylenol given for pain/discomfort.   1400- Pt ambulated to bathroom and reports BM. Feels a bit better.  1500-  Pt with wife at bedside. Information regarding current plan of care to be provided to night shift nurse for continuity of care.

## 2024-03-04 NOTE — CONSULTS
[facility] -  CONSULTATION NOTE               Author: Aydin Friedman M.D. Date & Time: 3/4/2024  11:46 AM       REASON FOR CONSULTATION:   - NEREIDA    CHIEF COMPLAINT:   -  left flank pain     HISTORY OF PRESENT ILLNESS:     74 yo male from [facility] with PMH significant for metastatic prostate cancer -  on hormone blocking medication, due for next injection in March. , CAD s/p coronary artery stent placemen (8/2007), HTN, and HLD who presented to OSH with hematuria associated with worsening left sided flank and back pain x 2 days. At OSH he was diagnosed with acute renal failure with presenting Crt of 7 and was transported to [facility] for higher level of care on 3/1/24. Previous Crt level noted to be 0.92 in July 2022 and 2 weeks prior to admission his creatinine was 1.37 . On arrival he had a Lasix renal scan with diuretic which revealed normal renal flow and function of the left kidney with decreased uptake and excretion by the right kidney; and findings suggestive of chronic right hydroureteronephrosis at the distal ureteral level with resultant right renal cortical atrophy. Urology was consulted and recommended a Johansen catheter placement. His Crt improved to 4.92 and further to 4.0 before worsening again to 5.13 today. A Rt Nephrostomy tube was placed by CO today to see if this will help improve renal functions. His flank pain has since resolved. Hematuria has cleared up as well. No F/C/N/V/CP/SOB.  No melena, hematochezia, hematemesis.  No HA, visual changes, or abdominal pain. Nephrology consulted for management of NEREIDA and possible need for dialysis     REVIEW OF SYSTEMS:    10 point ROS was performed and is as per HPI or otherwise negative    PAST MEDICAL HISTORY:   - CAD (coronary artery disease), Hyperlipidemia, and Hypertension.  Metastatic prostate cancer.  On hormone blocking medication, due for next injection in March.     PAST SURGICAL HISTORY:   - cardiac surgery and cardiac  "myles (07).      FAMILY HISTORY:   - Father had heart disease, and borderline diabetes. No family history of kidney diseaase     SOCIAL HISTORY:   Current  smoker: smokes 2 to 3 cigarettes/day x 46 years;   He drinks alcohol rarely maybe once a month, he uses cannabis edibles for sleep.  He lives with his spouse, they have 1 child in [place] and 1 in [place].  The last 2 months have been challenging because their son-in-law  suddenly in January and after this his wife was in the hospital for 5 days with pneumonia and COPD.    HOME MEDICATIONS:   - Reviewed and documented in chart    LABORATORY STUDIES:   - Reviewed and documented in chart    ALLERGIES:  Nkda [no known drug allergy]    VS:  BP (!) 153/85   Pulse 83   Temp 36.2 °C (97.2 °F) (Temporal)   Resp 18   Ht 1.702 m (5' 7\")   Wt 60.4 kg (133 lb 2.5 oz)   SpO2 98%   BMI 20.86 kg/m²   Physical Exam  Constitutional:       General: He is not in acute distress.     Appearance: Normal appearance. He is normal weight. He is not ill-appearing.   HENT:      Head: Normocephalic and atraumatic.      Nose: Nose normal.      Mouth/Throat:      Mouth: Mucous membranes are dry.      Pharynx: Oropharynx is clear.   Eyes:      Extraocular Movements: Extraocular movements intact.      Conjunctiva/sclera: Conjunctivae normal.      Pupils: Pupils are equal, round, and reactive to light.   Cardiovascular:      Rate and Rhythm: Normal rate.      Pulses: Normal pulses.   Pulmonary:      Effort: Pulmonary effort is normal. No respiratory distress.      Breath sounds: No stridor. No wheezing.   Abdominal:      Palpations: Abdomen is soft.      Comments: Rt Nephrostomy tube in place    Genitourinary:     Comments: Johansen catheter in place   Musculoskeletal:         General: No swelling or tenderness.      Cervical back: Normal range of motion and neck supple.   Skin:     General: Skin is warm and dry.   Neurological:      General: No focal deficit present.      Mental " Status: He is alert and oriented to person, place, and time.   Psychiatric:         Mood and Affect: Mood normal.         Behavior: Behavior normal.         Thought Content: Thought content normal.         Judgment: Judgment normal.         FLUID BALANCE:  In: 120 [P.O.:120]  Out: 1400     LABS:  Recent Labs     03/02/24  0231 03/03/24  0204 03/04/24  0545   SODIUM 139 138 136   POTASSIUM 5.0 4.9 5.0   CHLORIDE 105 106 102   CO2 15* 16* 14*   GLUCOSE 107* 79 84   BUN 45* 47* 51*   CREATININE 3.84* 4.00* 5.13*   CALCIUM 8.6 8.4* 8.8        IMAGING:  - Imaging studies reviewed by me  NM-RENAL WITH DIURETIC WASHOUT   Final Result       1. Normal renal flow and function scan of the left kidney.   2. Decreased uptake and excretion of the radiotracer by the right kidney. Findings suggest chronic right hydroureteronephrosis at the distal ureteral level with resultant right renal cortical atrophy.             IMPRESSION:  # NEREIDA on CKD-3   - baseline Crt around 1.37 per labs 2 weeks prior to admission  - Presenting Crt 7 at OSH, improved to 4.0 after arreola placement but has worsened again  - Likely cause of NEREIDA: Post renal obstruction, AVS ATN   #Atrophic /Echogenic Rt kidney    - Lasix renal scan 3/1/24: Normal renal flow and function scan of the left kidney.   cHronic right hydroureteronephrosis at the distal ureteral level with resultant right renal cortical atrophy  # Mild Hyperkalemia   # Severe Metabolic Acidosis   # Metastatic Prostate Cancer to bones     - started on Firmagon a month ago   # Gross Hematuria       PLAN:  - No compelling indication for RRT  - Start IV Bicarb drip to address worsening acidosis, stop other fluids    Encouraged PO fluid intake   - Monitor urine output and Rt Nephrostomy output closely   - Daily labs   - May need dialysis if renal functions worsen further in next 24-48 hrs   - Avoid Nephrotoxins, IV contrast  - Daily evaluation for RRT needs  - Dose all meds per eGFR < 15    Discussed with  patient and wife and answered all their questions.     Thank you for the consultation and we will follow closely!      Aydin Friedman MD, MS, FASN, FACP, YARITZA   [facility]   [phone]

## 2024-03-04 NOTE — PROGRESS NOTES
"     Urology Progress Note    Patient seen and examined    Overnight Events: None    S: No fevers, chills, nausea or vomiting.  S/p R NPT placement by IR earlier this morning, urine in NPT and arreola is watermelon colored without clot. Cr 5.13 (4.0), 1.1L UOP in 24h. Denies flank pain.    O:   BP (!) 153/85   Pulse 83   Temp 36.2 °C (97.2 °F) (Temporal)   Resp 18   Ht 1.702 m (5' 7\")   Wt 60.4 kg (133 lb 2.5 oz)   SpO2 98%   Recent Labs     03/02/24  0231 03/03/24  0204 03/04/24  0545   SODIUM 139 138 136   POTASSIUM 5.0 4.9 5.0   CHLORIDE 105 106 102   CO2 15* 16* 14*   GLUCOSE 107* 79 84   BUN 45* 47* 51*   CREATININE 3.84* 4.00* 5.13*   CALCIUM 8.6 8.4* 8.8     Recent Labs     03/03/24  0204 03/04/24  0545   WBC 10.5 11.3*   RBC 4.27* 5.01   HEMOGLOBIN 12.8* 14.7   HEMATOCRIT 38.8* 44.3   MCV 90.9 88.4   MCH 30.0 29.3   MCHC 33.0 33.2   RDW 43.8 42.4   PLATELETCT 284 339   MPV 9.8 9.4         Intake/Output Summary (Last 24 hours) at 3/3/2024 0911  Last data filed at 3/3/2024 0653  Gross per 24 hour   Intake 120 ml   Output 800 ml   Net -680 ml       Exam:  Abdomen soft, benign.           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  We cannot definitively say that the obstructed right kidney is causing the increase in Cr, given the Mag 3 renal scan shows decreased function of that kidney, however we can offer maximal decompression of that kidney by placing a arreola and right neph tube.  If renal function improves with placement of neph tube, then we can plan to address definitive treatment options " as an outpatient  - Trend serum Cr, UOP from arreola and NPT  - Continue daily tamsulosin 0.4mg  - Urology will follow    Case discussed with Dr Christina, who has directed this patient's plan of care.       KOLE Roblero-CIvonne   [address] [place] [address]   [phone]

## 2024-03-04 NOTE — PROGRESS NOTES
0700- Report received from night nurse.  0745- CHG bath completed.  0825- IR called to  pt. for neph procedure.  0945- Dressing to right back/neph tube in place. Pt alert & oriented.  Returned from IR; RN provided on instructions on care. Post vitals initiated 0950 am.  1430- Resumed renal diet; tolerated well.   1600- Neph tube right = flushed w/ 10 ml NS. Emptied bag prior out put is pink tinged clear fluid = 175 ml. approx.  Arreola cath emptied = 300 ml approx.   Urine sample collected- will send to lab.  Pt has sodium bicarb running. Would like to wait on laxative senna until after dinner and would like to drink more fluids. Pt has been up and ambulating and walking in place to help with circulation/movement.   1640- Pt stated to CNA that while using bathroom arreola cath fell out. I examined arreola cath. The tip of cath end was intact, urine pink tinged measured in bag approx 50 ml. Pt denies pain or pressure at this time. Hospitalist & Urology were notified. Per urology: she recommends to replace with a 16 Fr coude using lidocaine lube for discomfort if necessary.   1709- Pt was notified of urology recommendation and he stated that he did not want another arreola. He wants to wait for his wife who is speaking with a esteban. He wants the input prior to agreeing to another catheter.  1745 - Order for arreola/lido placed  and supplies ordered in case patient changes his mind and wants the catheter re[laced. Night nurse will be provided this information.  1830- Pt also wanted to wait and decide whether to take senna laxative until after dinner. Pt and wife decided to get arreola. I will pass order info on to night shift.

## 2024-03-04 NOTE — CARE PLAN
The patient is Stable - Low risk of patient condition declining or worsening    Shift Goals  Clinical Goals: monitor urine output  Patient Goals: sleep  Family Goals: Pt have nephrostomy placed today.    Progress made toward(s) clinical / shift goals:  Patient alert and oriented, resting comfortably in bed.  All meds given per Mar and all belongings within reach.  Johansen draining yellow urine with slight pink tinge.  Respirations even and unlabored.     Patient is not progressing towards the following goals:

## 2024-03-05 LAB
ANION GAP SERPL CALC-SCNC: 15 MMOL/L (ref 7–16)
BASOPHILS # BLD AUTO: 0.3 % (ref 0–1.8)
BASOPHILS # BLD: 0.03 K/UL (ref 0–0.12)
BUN SERPL-MCNC: 58 MG/DL (ref 8–22)
CALCIUM SERPL-MCNC: 7.8 MG/DL (ref 8.5–10.5)
CHLORIDE SERPL-SCNC: 98 MMOL/L (ref 96–112)
CO2 SERPL-SCNC: 22 MMOL/L (ref 20–33)
CREAT SERPL-MCNC: 7.14 MG/DL (ref 0.5–1.4)
EOSINOPHIL # BLD AUTO: 0.03 K/UL (ref 0–0.51)
EOSINOPHIL NFR BLD: 0.3 % (ref 0–6.9)
ERYTHROCYTE [DISTWIDTH] IN BLOOD BY AUTOMATED COUNT: 39.7 FL (ref 35.9–50)
GFR SERPLBLD CREATININE-BSD FMLA CKD-EPI: 8 ML/MIN/1.73 M 2
GLUCOSE SERPL-MCNC: 145 MG/DL (ref 65–99)
HCT VFR BLD AUTO: 34.3 % (ref 42–52)
HGB BLD-MCNC: 12.3 G/DL (ref 14–18)
IMM GRANULOCYTES # BLD AUTO: 0.04 K/UL (ref 0–0.11)
IMM GRANULOCYTES NFR BLD AUTO: 0.3 % (ref 0–0.9)
LYMPHOCYTES # BLD AUTO: 1.22 K/UL (ref 1–4.8)
LYMPHOCYTES NFR BLD: 10.4 % (ref 22–41)
MCH RBC QN AUTO: 30.3 PG (ref 27–33)
MCHC RBC AUTO-ENTMCNC: 35.9 G/DL (ref 32.3–36.5)
MCV RBC AUTO: 84.5 FL (ref 81.4–97.8)
MONOCYTES # BLD AUTO: 1.15 K/UL (ref 0–0.85)
MONOCYTES NFR BLD AUTO: 9.8 % (ref 0–13.4)
NEUTROPHILS # BLD AUTO: 9.27 K/UL (ref 1.82–7.42)
NEUTROPHILS NFR BLD: 78.9 % (ref 44–72)
NRBC # BLD AUTO: 0 K/UL
NRBC BLD-RTO: 0 /100 WBC (ref 0–0.2)
PLATELET # BLD AUTO: 293 K/UL (ref 164–446)
PMV BLD AUTO: 9.6 FL (ref 9–12.9)
POTASSIUM SERPL-SCNC: 4.5 MMOL/L (ref 3.6–5.5)
RBC # BLD AUTO: 4.06 M/UL (ref 4.7–6.1)
SODIUM SERPL-SCNC: 135 MMOL/L (ref 135–145)
WBC # BLD AUTO: 11.7 K/UL (ref 4.8–10.8)

## 2024-03-05 PROCEDURE — A9270 NON-COVERED ITEM OR SERVICE: HCPCS | Performed by: RADIOLOGY

## 2024-03-05 PROCEDURE — 51798 US URINE CAPACITY MEASURE: CPT

## 2024-03-05 PROCEDURE — 700105 HCHG RX REV CODE 258: Performed by: INTERNAL MEDICINE

## 2024-03-05 PROCEDURE — 99233 SBSQ HOSP IP/OBS HIGH 50: CPT | Performed by: HOSPITALIST

## 2024-03-05 PROCEDURE — A9270 NON-COVERED ITEM OR SERVICE: HCPCS | Performed by: HOSPITALIST

## 2024-03-05 PROCEDURE — A9270 NON-COVERED ITEM OR SERVICE: HCPCS | Performed by: INTERNAL MEDICINE

## 2024-03-05 PROCEDURE — 700111 HCHG RX REV CODE 636 W/ 250 OVERRIDE (IP): Performed by: INTERNAL MEDICINE

## 2024-03-05 PROCEDURE — 700102 HCHG RX REV CODE 250 W/ 637 OVERRIDE(OP): Performed by: RADIOLOGY

## 2024-03-05 PROCEDURE — 770006 HCHG ROOM/CARE - MED/SURG/GYN SEMI*

## 2024-03-05 PROCEDURE — 700101 HCHG RX REV CODE 250: Performed by: NURSE PRACTITIONER

## 2024-03-05 PROCEDURE — 85025 COMPLETE CBC W/AUTO DIFF WBC: CPT

## 2024-03-05 PROCEDURE — 700102 HCHG RX REV CODE 250 W/ 637 OVERRIDE(OP): Performed by: INTERNAL MEDICINE

## 2024-03-05 PROCEDURE — A9270 NON-COVERED ITEM OR SERVICE: HCPCS

## 2024-03-05 PROCEDURE — 36415 COLL VENOUS BLD VENIPUNCTURE: CPT

## 2024-03-05 PROCEDURE — 700102 HCHG RX REV CODE 250 W/ 637 OVERRIDE(OP): Performed by: HOSPITALIST

## 2024-03-05 PROCEDURE — 700102 HCHG RX REV CODE 250 W/ 637 OVERRIDE(OP)

## 2024-03-05 PROCEDURE — 80048 BASIC METABOLIC PNL TOTAL CA: CPT

## 2024-03-05 RX ORDER — SODIUM CHLORIDE 0.9 % (FLUSH) 0.9 %
5-10 SYRINGE (ML) INJECTION
Status: DISCONTINUED | OUTPATIENT
Start: 2024-03-05 | End: 2024-03-14 | Stop reason: HOSPADM

## 2024-03-05 RX ORDER — BISACODYL 10 MG
10 SUPPOSITORY, RECTAL RECTAL
Status: DISCONTINUED | OUTPATIENT
Start: 2024-03-05 | End: 2024-03-14 | Stop reason: HOSPADM

## 2024-03-05 RX ADMIN — SODIUM BICARBONATE 75 MEQ: 84 INJECTION, SOLUTION INTRAVENOUS at 15:08

## 2024-03-05 RX ADMIN — ACETAMINOPHEN 500 MG: 500 TABLET ORAL at 04:32

## 2024-03-05 RX ADMIN — SODIUM CHLORIDE, PRESERVATIVE FREE 10 ML: 5 INJECTION INTRAVENOUS at 20:22

## 2024-03-05 RX ADMIN — OXYCODONE 5 MG: 5 TABLET ORAL at 20:08

## 2024-03-05 RX ADMIN — DOCUSATE SODIUM 50 MG AND SENNOSIDES 8.6 MG 2 TABLET: 8.6; 5 TABLET, FILM COATED ORAL at 17:31

## 2024-03-05 RX ADMIN — BISACODYL 10 MG: 10 SUPPOSITORY RECTAL at 13:12

## 2024-03-05 RX ADMIN — OXYCODONE 2.5 MG: 5 TABLET ORAL at 00:15

## 2024-03-05 RX ADMIN — TAMSULOSIN HYDROCHLORIDE 0.4 MG: 0.4 CAPSULE ORAL at 04:32

## 2024-03-05 RX ADMIN — SODIUM BICARBONATE: 84 INJECTION, SOLUTION INTRAVENOUS at 08:05

## 2024-03-05 RX ADMIN — ATORVASTATIN CALCIUM 20 MG: 20 TABLET, FILM COATED ORAL at 17:32

## 2024-03-05 RX ADMIN — OXYCODONE 5 MG: 5 TABLET ORAL at 11:50

## 2024-03-05 ASSESSMENT — ENCOUNTER SYMPTOMS
FOCAL WEAKNESS: 0
BRUISES/BLEEDS EASILY: 0
VOMITING: 0
CONSTIPATION: 0
FLANK PAIN: 0
HEARTBURN: 0
SPEECH CHANGE: 0
FEVER: 0
EYES NEGATIVE: 1
SORE THROAT: 0
RESPIRATORY NEGATIVE: 1
PSYCHIATRIC NEGATIVE: 1
COUGH: 0
BLURRED VISION: 0
NAUSEA: 0
WEIGHT LOSS: 0
CHILLS: 0
WEAKNESS: 0
SHORTNESS OF BREATH: 0
HEADACHES: 0
CARDIOVASCULAR NEGATIVE: 1
MYALGIAS: 0
DIZZINESS: 0
ABDOMINAL PAIN: 0
DEPRESSION: 0
PALPITATIONS: 0
DIAPHORESIS: 0
NEUROLOGICAL NEGATIVE: 1

## 2024-03-05 ASSESSMENT — LIFESTYLE VARIABLES: SUBSTANCE_ABUSE: 0

## 2024-03-05 ASSESSMENT — PAIN DESCRIPTION - PAIN TYPE
TYPE: ACUTE PAIN

## 2024-03-05 NOTE — PROGRESS NOTES
"     Urology Progress Note    Patient seen and examined    Overnight Events: None    S:     3/5. No overnight events. Cr continues to worsen, now 7.14. Catheter displaced yesterday afternoon, nursing failed to replace. Per nurse, post void bladder scans have been 0cc. NPT with 775cc/24hrs, appears pink tinged. Minimal PO fluid intake per patient.     3/4. No fevers, chills, nausea or vomiting.  S/p R NPT placement by IR earlier this morning, urine in NPT and arreola is watermelon colored without clot. Cr 5.13 (4.0), 1.1L UOP in 24h. Denies flank pain.    O:   BP (!) 151/97   Pulse (!) 112   Temp 36.7 °C (98 °F) (Temporal)   Resp 18   Ht 1.702 m (5' 7\")   Wt 60.4 kg (133 lb 2.5 oz)   SpO2 95%   Recent Labs     03/03/24  0204 03/04/24  0545 03/05/24  0553   SODIUM 138 136 135   POTASSIUM 4.9 5.0 4.5   CHLORIDE 106 102 98   CO2 16* 14* 22   GLUCOSE 79 84 145*   BUN 47* 51* 58*   CREATININE 4.00* 5.13* 7.14*   CALCIUM 8.4* 8.8 7.8*     Recent Labs     03/03/24  0204 03/04/24  0545 03/05/24  0553   WBC 10.5 11.3* 11.7*   RBC 4.27* 5.01 4.06*   HEMOGLOBIN 12.8* 14.7 12.3*   HEMATOCRIT 38.8* 44.3 34.3*   MCV 90.9 88.4 84.5   MCH 30.0 29.3 30.3   MCHC 33.0 33.2 35.9   RDW 43.8 42.4 39.7   PLATELETCT 284 339 293   MPV 9.8 9.4 9.6         Intake/Output Summary (Last 24 hours) at 3/3/2024 0911  Last data filed at 3/3/2024 0653  Gross per 24 hour   Intake 120 ml   Output 800 ml   Net -680 ml       Exam:  Abdomen soft, benign.           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 If PVR remains minimal, no need to replace arreola.   - Continue daily tamsulosin 0.4mg  - Urology will follow         KOLE Gardner-ARISTIDES   [address] [address]   [phone]

## 2024-03-05 NOTE — PROGRESS NOTES
"Radiology Progress Note   Author: JUAREZ Villafana Date & Time created: 3/5/2024 0910   Date of admission  3/1/2024  Note to reader: this note follows the APSO format rather than the historical SOAP format. Assessment and plan located at the top of the note for ease of use.    Chief Complaint  73 y.o. male admitted 3/1/2024 with right sided back pain    HPI   Kaleb Yanes is a 74 yo male with PMH significant for prostate cancer, CAD s/p coronary artery stent placemen (8/2007), HTN, and HLD who presented to OSH with hematuria associated increasing left sided flank and back pain and was diagnosed with acute renal failure. Pt was transported to [facility] for higher level of care. 3/1/24 Lasix renal scan with diuretic washout revealed normal renal flow and function of the left kidney with decreased uptake and excretion by the right kidney. \"Findings suggest chronic right hydroureteronephrosis at the distal ureteral level with resultant right renal cortical atrophy\" (Avinash Crystal MD) 3/3/24 IR was consulted and Dr. Waite from IR performed a right nephrostogram with 8 fr nephrostomy tube placement on 03/04/24.     Interval History:   03/04/24- Pt  not in room undergoing right nephrostogram with right 8 fr nephrostomy tube placement  For maximal decompression of kidney  MAG3 shows decreased renal function.    03/05/24-right #8 [place] nephrostomy tube with 775 mL of pink urine, without clots, output in the last 24 hours.  Ordered placed for RNs to flush nephrostomy tube only for bloody urine or urine with clots.  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)    Hyperkalemia    Gross hematuria    Hydronephrosis with urinary obstruction due to renal " "calculus    NEREIDA (acute kidney injury) (HCC)    Metabolic acidosis    Constipation      Plan IR    -Follow renal function/assess renal function for improvement post nephrostomy tube placement  -Possible urology intervention if creatinine improves with NT-urology following  -  Irrigate right Nephrostomy tube with 10 ml of sterile saline q shift ONLY IF no output of bloody output  - Fluid cultures pending    - Education provided on S/S of infection   - Ok to shower with catheter as long as site is covered with waterproof dressing; change dressing if it becomes wet.  - No baths or submerging site under water until catheter removed   - For long term use, Nephrostomy tube will need to be exchanged every 3 months; sooner prn.   - IR will continue to follow nephrostomy tube while pt is in the hospital; we may not rou-       -Thank you for allowing Interventional Radiology team to participate in the patients care, if any additional care or requests are needed in the future please do not hesitate call or place IR order. [phone]           Review of Systems  Physical Exam   Review of Systems   Constitutional:  Positive for malaise/fatigue. Negative for chills and fever.   HENT:  Negative for hearing loss.    Eyes:  Negative for blurred vision.   Respiratory:  Negative for cough and shortness of breath.    Cardiovascular:  Negative for chest pain.   Gastrointestinal:  Negative for abdominal pain, heartburn, nausea and vomiting.   Genitourinary:  Positive for dysuria.        Right flank pain at NT site-   \"The urine in my urine is clear that my nephrostomy is bloody\"   Neurological:  Negative for dizziness, speech change, focal weakness and headaches.      Vitals:    03/05/24 0900   BP: (!) 158/97   Pulse: (!) 103   Resp: 18   Temp:    SpO2:         Physical Exam  Vitals and nursing note reviewed.   Constitutional:       Appearance: Normal appearance.   HENT:      Head: Normocephalic and atraumatic.      Nose: Nose normal.      " Mouth/Throat:      Mouth: Mucous membranes are moist.      Pharynx: Oropharynx is clear.   Eyes:      Pupils: Pupils are equal, round, and reactive to light.   Cardiovascular:      Rate and Rhythm: Normal rate.   Pulmonary:      Effort: Pulmonary effort is normal.   Abdominal:      General: Abdomen is flat.      Palpations: Abdomen is soft.   Genitourinary:     Comments: Right nephrostomy tube with dark pink drainage no clots  Right nephrostomy tube site CDI  Skin:     Capillary Refill: Capillary refill takes less than 2 seconds.   Neurological:      Mental Status: He is alert and oriented to person, place, and time. Mental status is at baseline.      Motor: No weakness.           Labs    Recent Labs     03/03/24  0204 03/04/24  0545 03/05/24  0553   WBC 10.5 11.3* 11.7*   RBC 4.27* 5.01 4.06*   HEMOGLOBIN 12.8* 14.7 12.3*   HEMATOCRIT 38.8* 44.3 34.3*   MCV 90.9 88.4 84.5   MCH 30.0 29.3 30.3   MCHC 33.0 33.2 35.9   RDW 43.8 42.4 39.7   PLATELETCT 284 339 293   MPV 9.8 9.4 9.6     Recent Labs     03/03/24  0204 03/04/24  0545 03/05/24  0553   SODIUM 138 136 135   POTASSIUM 4.9 5.0 4.5   CHLORIDE 106 102 98   CO2 16* 14* 22   GLUCOSE 79 84 145*   BUN 47* 51* 58*   CREATININE 4.00* 5.13* 7.14*   CALCIUM 8.4* 8.8 7.8*     Recent Labs     03/03/24  0204 03/04/24  0545 03/05/24  0553   ALBUMIN  --  3.6  --    TBILIRUBIN  --  0.4  --    ALKPHOSPHAT  --  289*  --    TOTPROTEIN  --  7.2  --    ALTSGPT  --  14  --    ASTSGOT  --  33  --    CREATININE 4.00* 5.13* 7.14*     IR-PERQ NEPH CATH NEW ACCESS (ALL RADIOLOGY) RIGHT   Final Result         1. Ultrasound And Fluoroscopic Guided Placement Of a right sided 8 [place]  Pigtail Locking Loop Percutaneous Nephrostomy Catheter.      2. Obstruction of the distal right ureter is demonstrated with no flow of contrast into the urinary bladder.      2. Nephrostogram Showing Satisfactory Catheter Position Without Extravasation.      NM-RENAL WITH DIURETIC WASHOUT   Final Result     "  1. Normal renal flow and function scan of the left kidney.   2. Decreased uptake and excretion of the radiotracer by the right kidney. Findings suggest chronic right hydroureteronephrosis at the distal ureteral level with resultant right renal cortical atrophy.      OUTSIDE IMAGES-CT ABDOMEN /PELVIS   Final Result        INR   Date Value Ref Range Status   03/03/2024 1.19 (H) 0.87 - 1.13 Final     Comment:     INR - Non-therapeutic Reference Range: 0.87-1.13  INR - Therapeutic Reference Range: 2.0-4.0       No results found for: \"POCINR\"     Intake/Output Summary (Last 24 hours) at 3/4/2024 1535  Last data filed at 3/4/2024 0332  Gross per 24 hour   Intake 120 ml   Output 800 ml   Net -680 ml      Labs not explicitly included in this progress note were reviewed by the author. Radiology/imaging not explicitly included in this progress note was reviewed by the author.                    INR   Date Value Ref Range Status   03/02/2024 1.21 (H) 0.87 - 1.13 Final       Comment:       INR - Non-therapeutic Reference Range: 0.87-1.13  INR - Therapeutic Reference Range: 2.0-4.0         No results found for: \"POCINR\"      Intake/Output Summary (Last 24 hours) at 3/3/2024 1626  Last data filed at 3/3/2024 1200      Gross per 24 hour   Intake 28 ml   Output 1997.5 ml   Net -1969.5 ml       Labs not explicitly included in this progress note were reviewed by the author. Radiology/imaging not explicitly included in this progress note was reviewed by the author.      I have performed a physical exam and reviewed and updated ROS and Plan today (3/5/2024).      45 minutes in directly providing and coordinating care and extensive data review.  No time overlap and excludes procedures.                   "

## 2024-03-05 NOTE — CARE PLAN
The patient is Stable - Low risk of patient condition declining or worsening    Shift Goals  Clinical Goals: Bladder decompression, monitor neph tube  Patient Goals: comfort, rst  Family Goals: Comfort and rest.    Progress made toward(s) clinical / shift goals:      Patient is not progressing towards the following goals:      Problem: Pain - Standard  Goal: Alleviation of pain or a reduction in pain to the patient’s comfort goal  Description: Target End Date:  Prior to discharge or change in level of care    Document on Vitals flowsheet    1.  Document pain using the appropriate pain scale per order or unit policy  2.  Educate and implement non-pharmacologic comfort measures (i.e. relaxation, distraction, massage, cold/heat therapy, etc.)  3.  Pain management medications as ordered  4.  Reassess pain after pain med administration per policy  5.  If opiods administered assess patient's response to pain medication is appropriate per POSS sedation scale  6.  Follow pain management plan developed in collaboration with patient and interdisciplinary team (including palliative care or pain specialists if applicable)  Outcome: Not Progressing  Note: Pt is A&Ox4, VSS on RA. Ambulatory to hallway w/no assistance. Pt reported left sided abdominal pain 7/10. Pain managed with PRN Oxy 5 mg with effectiveness. Pt reports relief of pain 3/10. Continues on IVF. Suppository administered per order. R neph tube in place flushed and patent, draining watermelon color output. Safety measures in place.

## 2024-03-05 NOTE — CARE PLAN
The patient is Stable - Low risk of patient condition declining or worsening    Shift Goals  Clinical Goals: Ensure pt is NPO for procedure during shift.  Patient Goals: Have procedure.  Family Goals: Comfort and rest.    Progress made toward(s) clinical / shift goals:  Pt had procedure performed. He is doing well and ambulating.     Problem: Knowledge Deficit - Standard  Goal: Patient and family/care givers will demonstrate understanding of plan of care, disease process/condition, diagnostic tests and medications  Outcome: Progressing

## 2024-03-05 NOTE — PROGRESS NOTES
[facility] Medicine Daily Progress Note    Date of Service  3/5/2024    Chief Complaint  Kaleb Yanes is a 73 y.o. male admitted 3/1/2024 with flank pain    Hospital Course  Kaleb Yanes is a 73 y.o. male who presented 3/1/2024 with acute renal failure due to ureteral obstruction.  The patient lives in [facility] had been having increased left-sided flank pain and back pain for which she has been taking ibuprofen this morning he woke up and had 3 episodes of hematuria and went to the emergency department where he was found to have acute renal failure and hyperkalemia 2 weeks ago his creatinine was 1.37 and on day of admission it was 7, potassium was 5.4.     Patient stated he initially attributed his flank pain and back pain to his bony metastases from his prostate cancer.  He would not have gone into the emergency room except for the hematuria, after the initial 3 episodes however his hematuria has now resolved, he denies any fever, chills, dysuria he thinks he has been making a normal amount of urine.  His appetite has been poor since beginning of the year but his family has been through a lot of stress and he thinks that his appetite is starting to improve he has not had any nausea or vomiting he does not describe anything that sounds like renal colic.    Interval Problem Update  Axox3, he did not sleep well due to his room mate, states he is tired as a result. He reports some intermittent L flank pain, none currently, denies R pain, tolerating nephrostomy tube well. Multiple attempts by nursing overnight to re place arreola were unsuccessful, bladder scans have been low, he reports minimal urination, did have mild supra pubic pain after multiple attempts at arreola placement. Discussed with urology who is following. ROS otherwise negative.     I have discussed this patient's plan of care and discharge plan at IDT rounds today with Case Management, Nursing, Nursing leadership, and other  members of the IDT team.    Consultants/Specialty  Urology Nv  Nephrology Robin  IR    Code Status  DNAR/DNI    Disposition  The patient is not medically cleared for discharge to home or a post-acute facility.      I have placed the appropriate orders for post-discharge needs.    Review of Systems  Review of Systems   Constitutional:  Positive for malaise/fatigue. Negative for chills, diaphoresis, fever and weight loss.   HENT: Negative.  Negative for sore throat.    Eyes: Negative.  Negative for blurred vision.   Respiratory: Negative.  Negative for cough and shortness of breath.    Cardiovascular: Negative.  Negative for chest pain, palpitations and leg swelling.   Gastrointestinal:  Negative for abdominal pain, constipation, nausea and vomiting.   Genitourinary:  Negative for dysuria and flank pain.   Musculoskeletal:  Negative for myalgias.   Skin: Negative.  Negative for itching and rash.   Neurological: Negative.  Negative for dizziness, focal weakness, weakness and headaches.   Endo/Heme/Allergies: Negative.  Does not bruise/bleed easily.   Psychiatric/Behavioral: Negative.  Negative for depression, substance abuse and suicidal ideas.    All other systems reviewed and are negative.       Physical Exam  Temp:  [36.3 °C (97.4 °F)-37.1 °C (98.7 °F)] 36.7 °C (98 °F)  Pulse:  [] 96  Resp:  [16-18] 18  BP: (147-161)/(82-97) 150/86  SpO2:  [93 %-98 %] 96 %    Physical Exam  Vitals and nursing note reviewed. Exam conducted with a chaperone present.   Constitutional:       General: He is not in acute distress.     Appearance: Normal appearance. He is not diaphoretic.   HENT:      Head: Normocephalic.      Nose: Nose normal.      Mouth/Throat:      Mouth: Mucous membranes are moist.   Eyes:      Pupils: Pupils are equal, round, and reactive to light.   Cardiovascular:      Rate and Rhythm: Normal rate and regular rhythm.      Pulses: Normal pulses.      Heart sounds: Normal heart sounds.   Pulmonary:       Effort: Pulmonary effort is normal.      Breath sounds: Normal breath sounds.   Abdominal:      General: Abdomen is flat. Bowel sounds are normal.      Palpations: Abdomen is soft.      Tenderness: There is no left CVA tenderness.      Comments: R nephrostomy tube cdi with mild associated hematuria   Genitourinary:     Comments: R nephrostomy tube cdi, with clear urine  Musculoskeletal:         General: No swelling or deformity. Normal range of motion.   Skin:     General: Skin is warm and dry.      Capillary Refill: Capillary refill takes less than 2 seconds.   Neurological:      General: No focal deficit present.      Mental Status: He is alert and oriented to person, place, and time.      Cranial Nerves: No cranial nerve deficit.   Psychiatric:         Mood and Affect: Mood normal.         Behavior: Behavior normal.         Fluids    Intake/Output Summary (Last 24 hours) at 3/5/2024 1308  Last data filed at 3/5/2024 1100  Gross per 24 hour   Intake 440 ml   Output 1475 ml   Net -1035 ml       Laboratory  Recent Labs     03/03/24  0204 03/04/24  0545 03/05/24  0553   WBC 10.5 11.3* 11.7*   RBC 4.27* 5.01 4.06*   HEMOGLOBIN 12.8* 14.7 12.3*   HEMATOCRIT 38.8* 44.3 34.3*   MCV 90.9 88.4 84.5   MCH 30.0 29.3 30.3   MCHC 33.0 33.2 35.9   RDW 43.8 42.4 39.7   PLATELETCT 284 339 293   MPV 9.8 9.4 9.6     Recent Labs     03/03/24  0204 03/04/24  0545 03/05/24  0553   SODIUM 138 136 135   POTASSIUM 4.9 5.0 4.5   CHLORIDE 106 102 98   CO2 16* 14* 22   GLUCOSE 79 84 145*   BUN 47* 51* 58*   CREATININE 4.00* 5.13* 7.14*   CALCIUM 8.4* 8.8 7.8*     Recent Labs     03/03/24  1051   INR 1.19*               Imaging  IR-PERQ NEPH CATH NEW ACCESS (ALL RADIOLOGY) RIGHT   Final Result         1. Ultrasound And Fluoroscopic Guided Placement Of a right sided 8 [place]  Pigtail Locking Loop Percutaneous Nephrostomy Catheter.      2. Obstruction of the distal right ureter is demonstrated with no flow of contrast into the urinary bladder.       2. Nephrostogram Showing Satisfactory Catheter Position Without Extravasation.      NM-RENAL WITH DIURETIC WASHOUT   Final Result      1. Normal renal flow and function scan of the left kidney.   2. Decreased uptake and excretion of the radiotracer by the right kidney. Findings suggest chronic right hydroureteronephrosis at the distal ureteral level with resultant right renal cortical atrophy.      OUTSIDE IMAGES-CT ABDOMEN /PELVIS   Final Result           Assessment/Plan  * Acute renal failure (ARF) (HCC)- (present on admission)  Assessment & Plan  Likely 2/2 obstruction from obstructing stone and suspected intrinsic renal disease contributing, had abnormal mag scan - nephrostomy tube in place  See above  Renal function worse overnight, see above  Continue bicarb drip  Nephrology following  Following closely  Avoid nephrotoxins  Continue iv fluids  Will check bmp in am    Constipation  Assessment & Plan  Resolved  Continue bowel regimen    Metabolic acidosis  Assessment & Plan  Related to renal failure  Resolved with iv bicarb  following    Hydronephrosis with urinary obstruction due to renal calculus- (present on admission)  Assessment & Plan  Vs stricture  See above  Johansen and nephrostomy R tube in place  Urology following  IR following        Gross hematuria- (present on admission)  Assessment & Plan  Resolved  Holding ASA for nephrostomy tube and due to hematuria  Mech DVT prophyalxis  No further hematuria but h/h decreasing, no melena or hematochezia, suspect hemodilution is contributing  Following  Will check cbc in am    Hyperkalemia- (present on admission)  Assessment & Plan  2/2 NEREIDA (5.4 @ outside hospital)   Resolved  Following  Bmp in am    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 In review of yesterday's note (3/4/2024), there are no changes except as documented above.

## 2024-03-05 NOTE — PROGRESS NOTES
[facility] -  PROGRESS NOTE               Author: Kemi Kelly D.O. Date & Time: 3/5/2024  12:32 PM     HPI:  72 yo male from [facility] with PMH significant for metastatic prostate cancer -  on hormone blocking medication, due for next injection in March. , CAD s/p coronary artery stent placemen (8/2007), HTN, and HLD who presented to OSH with hematuria associated with worsening left sided flank and back pain x 2 days. At OSH he was diagnosed with acute renal failure with presenting Crt of 7 and was transported to [facility] for higher level of care on 3/1/24. Previous Crt level noted to be 0.92 in July 2022 and 2 weeks prior to admission his creatinine was 1.37 . On arrival he had a Lasix renal scan with diuretic which revealed normal renal flow and function of the left kidney with decreased uptake and excretion by the right kidney; and findings suggestive of chronic right hydroureteronephrosis at the distal ureteral level with resultant right renal cortical atrophy. Urology was consulted and recommended a Arreola catheter placement. His Crt improved to 4.92 and further to 4.0 before worsening again to 5.13 today. A Rt Nephrostomy tube was placed by MT today to see if this will help improve renal functions. His flank pain has since resolved. Hematuria has cleared up as well. No F/C/N/V/CP/SOB.  No melena, hematochezia, hematemesis.  No HA, visual changes, or abdominal pain. Nephrology consulted for management of NEREIDA and possible need for dialysis      DAILY NEPHROLOGY SUMMARY:  03/04 - consult done  03/05 - SCr trended up, arreola dislodged and not replaced due to PVR 0, SBP 140s-160s, UOP 1075mL, no freely voided urine recorded, UA with moderate occult blood, 0-2 hyaline casts, no protein, no new c/o, reports that he is finally voiding and urine via bladder is clearing    REVIEW OF SYSTEMS:    10 point ROS reviewed and is as per HPI/daily summary or otherwise negative    PMH/PSH/SH/FH:  "Reviewed and unchanged since admission note  CURRENT MEDICATIONS: Reviewed from admission to present day    VS:  BP (!) 150/86   Pulse 96   Temp 36.7 °C (98 °F) (Temporal)   Resp 18   Ht 1.702 m (5' 7\")   Wt 60.4 kg (133 lb 2.5 oz)   SpO2 96%   BMI 20.86 kg/m²   Physical Exam  Vitals and nursing note reviewed.   HENT:      Head: Normocephalic and atraumatic.      Right Ear: External ear normal.      Left Ear: External ear normal.      Nose: Nose normal.      Mouth/Throat:      Mouth: Mucous membranes are moist.      Pharynx: Oropharynx is clear.   Eyes:      General: No scleral icterus.  Cardiovascular:      Rate and Rhythm: Normal rate and regular rhythm.   Pulmonary:      Effort: Pulmonary effort is normal.      Breath sounds: No wheezing.   Abdominal:      General: Abdomen is flat. There is no distension.   Musculoskeletal:      Right lower leg: No edema.      Left lower leg: No edema.   Skin:     General: Skin is warm and dry.      Findings: No bruising.   Neurological:      General: No focal deficit present.      Mental Status: He is alert and oriented to person, place, and time.      Cranial Nerves: No cranial nerve deficit.   Psychiatric:         Mood and Affect: Mood normal.         Behavior: Behavior normal.         Thought Content: Thought content normal.         Judgment: Judgment normal.         Fluids:  In: 200 [P.O.:200]  Out: 1075     LABS:  Recent Labs     03/03/24  0204 03/04/24  0545 03/05/24  0553   SODIUM 138 136 135   POTASSIUM 4.9 5.0 4.5   CHLORIDE 106 102 98   CO2 16* 14* 22   GLUCOSE 79 84 145*   BUN 47* 51* 58*   CREATININE 4.00* 5.13* 7.14*   CALCIUM 8.4* 8.8 7.8*     Imaging: reviewed    IMPRESSION:  # NEREIDA on CKD-3              - baseline Crt around 1.37 per labs 2 weeks prior to admission  - Presenting Crt 7 at OSH, improved to 4.0 after arreola placement but has worsened again  - Likely cause of NEREIDA: Post renal obstruction, VS ATN (of note, patient was taking NSAIDs for flank " pain)  #Atrophic /Echogenic Rt kidney               - Lasix renal scan 3/1/24: Normal renal flow and function scan of the left kidney.              chronic right hydroureteronephrosis at the distal ureteral level with resultant right renal cortical atrophy; s/p R nephrostomy  # Mild Hyperkalemia   # Severe Metabolic Acidosis, improved  # Metastatic Prostate Cancer to bones               - started on Firmagon a month ago   # Gross Hematuria         PLAN:  - No compelling indication for RRT  - Change IVF to 1/2NS with 75meq sodium bicarbonate at 75mL/hr    Encouraged PO fluid intake   - Monitor urine output and Rt Nephrostomy output closely   - Daily labs   - May need dialysis if renal functions worsen further in next 24-48 hrs, patient agreeable  - Avoid Nephrotoxins, IV contrast  - Daily evaluation for RRT needs  - Dose all meds per eGFR < 15  - strict I/O  - he understands he is on a renal diet    Other management per primary service

## 2024-03-06 ENCOUNTER — APPOINTMENT (OUTPATIENT)
Dept: RADIOLOGY | Facility: MEDICAL CENTER | Age: 74
DRG: 673 | End: 2024-03-06
Payer: MEDICARE

## 2024-03-06 PROBLEM — E87.29 HIGH ANION GAP METABOLIC ACIDOSIS: Status: ACTIVE | Noted: 2024-03-02

## 2024-03-06 LAB
ALBUMIN SERPL BCP-MCNC: 3.2 G/DL (ref 3.2–4.9)
ANION GAP SERPL CALC-SCNC: 18 MMOL/L (ref 7–16)
BASOPHILS # BLD AUTO: 0.4 % (ref 0–1.8)
BASOPHILS # BLD: 0.04 K/UL (ref 0–0.12)
BUN SERPL-MCNC: 58 MG/DL (ref 8–22)
CALCIUM ALBUM COR SERPL-MCNC: 9.1 MG/DL (ref 8.5–10.5)
CALCIUM SERPL-MCNC: 8.5 MG/DL (ref 8.5–10.5)
CHLORIDE SERPL-SCNC: 92 MMOL/L (ref 96–112)
CO2 SERPL-SCNC: 25 MMOL/L (ref 20–33)
CREAT SERPL-MCNC: 6.79 MG/DL (ref 0.5–1.4)
EOSINOPHIL # BLD AUTO: 0.11 K/UL (ref 0–0.51)
EOSINOPHIL NFR BLD: 1 % (ref 0–6.9)
ERYTHROCYTE [DISTWIDTH] IN BLOOD BY AUTOMATED COUNT: 40.1 FL (ref 35.9–50)
GFR SERPLBLD CREATININE-BSD FMLA CKD-EPI: 8 ML/MIN/1.73 M 2
GLUCOSE SERPL-MCNC: 98 MG/DL (ref 65–99)
HCT VFR BLD AUTO: 36.7 % (ref 42–52)
HGB BLD-MCNC: 12.6 G/DL (ref 14–18)
IMM GRANULOCYTES # BLD AUTO: 0.05 K/UL (ref 0–0.11)
IMM GRANULOCYTES NFR BLD AUTO: 0.5 % (ref 0–0.9)
LYMPHOCYTES # BLD AUTO: 1.88 K/UL (ref 1–4.8)
LYMPHOCYTES NFR BLD: 17.1 % (ref 22–41)
MCH RBC QN AUTO: 29.4 PG (ref 27–33)
MCHC RBC AUTO-ENTMCNC: 34.3 G/DL (ref 32.3–36.5)
MCV RBC AUTO: 85.7 FL (ref 81.4–97.8)
MONOCYTES # BLD AUTO: 1.19 K/UL (ref 0–0.85)
MONOCYTES NFR BLD AUTO: 10.8 % (ref 0–13.4)
NEUTROPHILS # BLD AUTO: 7.71 K/UL (ref 1.82–7.42)
NEUTROPHILS NFR BLD: 70.2 % (ref 44–72)
NRBC # BLD AUTO: 0 K/UL
NRBC BLD-RTO: 0 /100 WBC (ref 0–0.2)
PHOSPHATE SERPL-MCNC: 4.7 MG/DL (ref 2.5–4.5)
PLATELET # BLD AUTO: 325 K/UL (ref 164–446)
PMV BLD AUTO: 9.9 FL (ref 9–12.9)
POTASSIUM SERPL-SCNC: 4.8 MMOL/L (ref 3.6–5.5)
RBC # BLD AUTO: 4.28 M/UL (ref 4.7–6.1)
SODIUM SERPL-SCNC: 135 MMOL/L (ref 135–145)
WBC # BLD AUTO: 11 K/UL (ref 4.8–10.8)

## 2024-03-06 PROCEDURE — A9270 NON-COVERED ITEM OR SERVICE: HCPCS

## 2024-03-06 PROCEDURE — 85025 COMPLETE CBC W/AUTO DIFF WBC: CPT

## 2024-03-06 PROCEDURE — 770006 HCHG ROOM/CARE - MED/SURG/GYN SEMI*

## 2024-03-06 PROCEDURE — 700102 HCHG RX REV CODE 250 W/ 637 OVERRIDE(OP)

## 2024-03-06 PROCEDURE — 700105 HCHG RX REV CODE 258: Performed by: INTERNAL MEDICINE

## 2024-03-06 PROCEDURE — 36415 COLL VENOUS BLD VENIPUNCTURE: CPT

## 2024-03-06 PROCEDURE — 71045 X-RAY EXAM CHEST 1 VIEW: CPT

## 2024-03-06 PROCEDURE — 80069 RENAL FUNCTION PANEL: CPT

## 2024-03-06 PROCEDURE — 51798 US URINE CAPACITY MEASURE: CPT

## 2024-03-06 PROCEDURE — 93005 ELECTROCARDIOGRAM TRACING: CPT

## 2024-03-06 PROCEDURE — A9270 NON-COVERED ITEM OR SERVICE: HCPCS | Performed by: HOSPITALIST

## 2024-03-06 PROCEDURE — 700102 HCHG RX REV CODE 250 W/ 637 OVERRIDE(OP): Performed by: INTERNAL MEDICINE

## 2024-03-06 PROCEDURE — 700111 HCHG RX REV CODE 636 W/ 250 OVERRIDE (IP): Performed by: INTERNAL MEDICINE

## 2024-03-06 PROCEDURE — A9270 NON-COVERED ITEM OR SERVICE: HCPCS | Performed by: INTERNAL MEDICINE

## 2024-03-06 PROCEDURE — 700102 HCHG RX REV CODE 250 W/ 637 OVERRIDE(OP): Performed by: HOSPITALIST

## 2024-03-06 PROCEDURE — 99232 SBSQ HOSP IP/OBS MODERATE 35: CPT | Performed by: STUDENT IN AN ORGANIZED HEALTH CARE EDUCATION/TRAINING PROGRAM

## 2024-03-06 RX ORDER — LABETALOL HYDROCHLORIDE 5 MG/ML
10 INJECTION, SOLUTION INTRAVENOUS EVERY 6 HOURS PRN
Status: DISCONTINUED | OUTPATIENT
Start: 2024-03-06 | End: 2024-03-07

## 2024-03-06 RX ADMIN — ACETAMINOPHEN 1000 MG: 500 TABLET ORAL at 20:46

## 2024-03-06 RX ADMIN — OXYCODONE 5 MG: 5 TABLET ORAL at 07:53

## 2024-03-06 RX ADMIN — TAMSULOSIN HYDROCHLORIDE 0.4 MG: 0.4 CAPSULE ORAL at 04:46

## 2024-03-06 RX ADMIN — OXYCODONE 5 MG: 5 TABLET ORAL at 02:29

## 2024-03-06 RX ADMIN — BISACODYL 10 MG: 10 SUPPOSITORY RECTAL at 04:46

## 2024-03-06 RX ADMIN — SODIUM BICARBONATE 75 MEQ: 84 INJECTION, SOLUTION INTRAVENOUS at 04:46

## 2024-03-06 RX ADMIN — SODIUM BICARBONATE 75 MEQ: 84 INJECTION, SOLUTION INTRAVENOUS at 18:39

## 2024-03-06 RX ADMIN — ATORVASTATIN CALCIUM 20 MG: 20 TABLET, FILM COATED ORAL at 17:17

## 2024-03-06 RX ADMIN — METOPROLOL TARTRATE 12.5 MG: 25 TABLET, FILM COATED ORAL at 22:42

## 2024-03-06 RX ADMIN — ACETAMINOPHEN 500 MG: 500 TABLET ORAL at 04:45

## 2024-03-06 RX ADMIN — ACETAMINOPHEN 1000 MG: 500 TABLET ORAL at 13:16

## 2024-03-06 RX ADMIN — DOCUSATE SODIUM 50 MG AND SENNOSIDES 8.6 MG 2 TABLET: 8.6; 5 TABLET, FILM COATED ORAL at 17:17

## 2024-03-06 ASSESSMENT — ENCOUNTER SYMPTOMS
NAUSEA: 0
DIZZINESS: 0
SHORTNESS OF BREATH: 0
VOMITING: 0
FLANK PAIN: 1
ABDOMINAL PAIN: 0
HEARTBURN: 0
NECK PAIN: 0
BLURRED VISION: 0
COUGH: 0
BACK PAIN: 0
CHILLS: 0
FEVER: 0
MYALGIAS: 0
HEADACHES: 0
SPEECH CHANGE: 0
FOCAL WEAKNESS: 0

## 2024-03-06 ASSESSMENT — PAIN DESCRIPTION - PAIN TYPE: TYPE: ACUTE PAIN

## 2024-03-06 NOTE — PROGRESS NOTES
[facility] -  PROGRESS NOTE               Author: Kemi Kelly D.O. Date & Time: 3/6/2024  11:07 AM     HPI:  74 yo male from [facility] with PMH significant for metastatic prostate cancer -  on hormone blocking medication, due for next injection in March. , CAD s/p coronary artery stent placemen (8/2007), HTN, and HLD who presented to OSH with hematuria associated with worsening left sided flank and back pain x 2 days. At OSH he was diagnosed with acute renal failure with presenting Crt of 7 and was transported to [facility] for higher level of care on 3/1/24. Previous Crt level noted to be 0.92 in July 2022 and 2 weeks prior to admission his creatinine was 1.37 . On arrival he had a Lasix renal scan with diuretic which revealed normal renal flow and function of the left kidney with decreased uptake and excretion by the right kidney; and findings suggestive of chronic right hydroureteronephrosis at the distal ureteral level with resultant right renal cortical atrophy. Urology was consulted and recommended a Arreola catheter placement. His Crt improved to 4.92 and further to 4.0 before worsening again to 5.13 today. A Rt Nephrostomy tube was placed by DE today to see if this will help improve renal functions. His flank pain has since resolved. Hematuria has cleared up as well. No F/C/N/V/CP/SOB.  No melena, hematochezia, hematemesis.  No HA, visual changes, or abdominal pain. Nephrology consulted for management of NEREIDA and possible need for dialysis      DAILY NEPHROLOGY SUMMARY:  03/04 - consult done  03/05 - SCr trended up, arreola dislodged and not replaced due to PVR 0, SBP 140s-160s, UOP 1075mL, no freely voided urine recorded, UA with moderate occult blood, 0-2 hyaline casts, no protein, no new c/o, reports that he is finally voiding and urine via bladder is clearing  03/06 - SCr trending down, UOP 970mL recorded, SBP 140s-170s, no new c/o, has been walking laps, wife encouraging him to  "increase PO intake    REVIEW OF SYSTEMS:    10 point ROS reviewed and is as per HPI/daily summary or otherwise negative    PMH/PSH/SH/FH: Reviewed and unchanged since admission note  CURRENT MEDICATIONS: Reviewed from admission to present day    VS:  BP (!) 158/90   Pulse 88   Temp 36.6 °C (97.8 °F) (Temporal)   Resp 18   Ht 1.702 m (5' 7\")   Wt 60.4 kg (133 lb 2.5 oz)   SpO2 92%   BMI 20.86 kg/m²   Physical Exam  Vitals and nursing note reviewed.   HENT:      Head: Normocephalic and atraumatic.      Right Ear: External ear normal.      Left Ear: External ear normal.      Nose: Nose normal.      Mouth/Throat:      Mouth: Mucous membranes are moist.      Pharynx: Oropharynx is clear.   Eyes:      General: No scleral icterus.  Cardiovascular:      Rate and Rhythm: Normal rate and regular rhythm.   Pulmonary:      Effort: Pulmonary effort is normal.      Breath sounds: No wheezing.   Abdominal:      General: Abdomen is flat. There is no distension.   Musculoskeletal:      Right lower leg: No edema.      Left lower leg: No edema.   Skin:     General: Skin is warm and dry.      Findings: No bruising.   Neurological:      General: No focal deficit present.      Mental Status: He is alert and oriented to person, place, and time.      Cranial Nerves: No cranial nerve deficit.   Psychiatric:         Mood and Affect: Mood normal.         Behavior: Behavior normal.         Thought Content: Thought content normal.         Judgment: Judgment normal.         Fluids:  In: 240 [P.O.:240]  Out: 970     LABS:  Recent Labs     03/04/24  0545 03/05/24  0553 03/06/24  0149   SODIUM 136 135 135   POTASSIUM 5.0 4.5 4.8   CHLORIDE 102 98 92*   CO2 14* 22 25   GLUCOSE 84 145* 98   BUN 51* 58* 58*   CREATININE 5.13* 7.14* 6.79*   CALCIUM 8.8 7.8* 8.5     Imaging: reviewed    IMPRESSION:  # NEREIDA, improving              - baseline Crt around 1.37 per labs 2 weeks prior to admission  - Presenting Crt 7 at OSH, improved to 4.0 after arreola " placement but has worsened again  - Likely cause of NEREIDA: Post renal obstruction, VS ATN (of note, patient was taking NSAIDs for flank pain)  # CKD 3, NOS  #Atrophic /Echogenic Rt kidney               - Lasix renal scan 3/1/24: Normal renal flow and function scan of the left kidney.              chronic right hydroureteronephrosis at the distal ureteral level with resultant right renal cortical atrophy; s/p R nephrostomy  # Mild Hyperkalemia   # Severe Metabolic Acidosis, improved  # Metastatic Prostate Cancer to bones               - started on Firmagon a month ago   # Gross Hematuria         PLAN:  - No compelling indication for RRT  - Continue 1/2NS with 75meq sodium bicarbonate at 75mL/hr  - Encourage PO fluid intake   - Monitor urine output and Rt Nephrostomy output closely   - Daily labs   - Not likely to require dialysis, but patient agreeable if needed  - Avoid Nephrotoxins, IV contrast  - Daily evaluation for RRT needs  - Dose all meds per eGFR < 15  - strict I/O  - he understands he is on a renal diet    Other management per primary service

## 2024-03-06 NOTE — PROGRESS NOTES
"     Urology Progress Note    Patient seen and examined    Overnight Events: None    S:     3/6. No overnight events. Patient resting comfortably in bed, tolerating PO this AM. Cr slightly improved overnight to 6.79 (7.14). Patient is voiding well without arreola catheter. Per nursing PVR with each void has been <100 cc. NPT had output of  400 cc/24 hours, clear yellow urine with minimal pink tinge present in nephrostomy bag.    3/5. No overnight events. Cr continues to worsen, now 7.14. Catheter displaced yesterday afternoon, nursing failed to replace. Per nurse, post void bladder scans have been 0cc. NPT with 775cc/24hrs, appears pink tinged. Minimal PO fluid intake per patient.     3/4. No fevers, chills, nausea or vomiting.  S/p R NPT placement by IR earlier this morning, urine in NPT and arreola is watermelon colored without clot. Cr 5.13 (4.0), 1.1L UOP in 24h. Denies flank pain.    O:   BP (!) 158/90   Pulse 88   Temp 36.6 °C (97.8 °F) (Temporal)   Resp 18   Ht 1.702 m (5' 7\")   Wt 60.4 kg (133 lb 2.5 oz)   SpO2 92%   Recent Labs     03/04/24  0545 03/05/24  0553 03/06/24  0149   SODIUM 136 135 135   POTASSIUM 5.0 4.5 4.8   CHLORIDE 102 98 92*   CO2 14* 22 25   GLUCOSE 84 145* 98   BUN 51* 58* 58*   CREATININE 5.13* 7.14* 6.79*   CALCIUM 8.8 7.8* 8.5     Recent Labs     03/04/24  0545 03/05/24  0553 03/06/24  0149   WBC 11.3* 11.7* 11.0*   RBC 5.01 4.06* 4.28*   HEMOGLOBIN 14.7 12.3* 12.6*   HEMATOCRIT 44.3 34.3* 36.7*   MCV 88.4 84.5 85.7   MCH 29.3 30.3 29.4   MCHC 33.2 35.9 34.3   RDW 42.4 39.7 40.1   PLATELETCT 339 293 325   MPV 9.4 9.6 9.9         Intake/Output Summary (Last 24 hours) at 3/3/2024 0911  Last data filed at 3/3/2024 0653  Gross per 24 hour   Intake 120 ml   Output 800 ml   Net -680 ml       Exam:  Abdomen soft, benign.           A/P:    Active Hospital Problems    Diagnosis     Constipation [K59.00]     Metabolic acidosis [E87.20]     Acute renal failure (ARF) (HCC) [N17.9]     Prostate " cancer metastatic to intraabdominal lymph node (HCC) [C61, C77.2]     Hyperkalemia [E87.5]     Gross hematuria [R31.0]     Hydronephrosis with urinary obstruction due to renal calculus [N13.2]     NEREIDA (acute kidney injury) (HCC) [N17.9]        - Renal function showed minor improvement with NPT, nephrology follwing.  - Trend serum Cr, UOP NPT  - Bladder scan 2x per shift to ensure emptying. Will continue to monitor.  - Continue daily tamsulosin 0.4mg  - Urology will follow, please contact for any questions or concerns.         Reilly Peters, P.A.   [address]  [place] [id number]   [phone]

## 2024-03-06 NOTE — CARE PLAN
The patient is Stable - Low risk of patient condition declining or worsening    Shift Goals  Clinical Goals: bladder decompression  Patient Goals: comfort, no arreola needed  Family Goals: Comfort and rest.    Progress made toward(s) clinical / shift goals:      Patient is not progressing towards the following goals:    Pt states that he is feeling better today. Pt does endorse similar quality of L sided flank pain, but I noticed mildly more swelling. Pt bladder scanned twice post void with 38 mLs and 0 mLs. Pt had one medium hard BM.

## 2024-03-07 ENCOUNTER — APPOINTMENT (OUTPATIENT)
Dept: RADIOLOGY | Facility: MEDICAL CENTER | Age: 74
DRG: 673 | End: 2024-03-07
Attending: STUDENT IN AN ORGANIZED HEALTH CARE EDUCATION/TRAINING PROGRAM
Payer: MEDICARE

## 2024-03-07 PROBLEM — I63.9 OCCIPITAL STROKE (HCC): Status: ACTIVE | Noted: 2024-03-07

## 2024-03-07 PROBLEM — J96.01 ACUTE RESPIRATORY FAILURE WITH HYPOXIA (HCC): Status: ACTIVE | Noted: 2024-03-07

## 2024-03-07 LAB
ALBUMIN SERPL BCP-MCNC: 3 G/DL (ref 3.2–4.9)
BUN SERPL-MCNC: 63 MG/DL (ref 8–22)
CALCIUM ALBUM COR SERPL-MCNC: 8.8 MG/DL (ref 8.5–10.5)
CALCIUM SERPL-MCNC: 8 MG/DL (ref 8.5–10.5)
CHLORIDE SERPL-SCNC: 92 MMOL/L (ref 96–112)
CO2 SERPL-SCNC: 25 MMOL/L (ref 20–33)
CREAT SERPL-MCNC: 6.8 MG/DL (ref 0.5–1.4)
EKG IMPRESSION: NORMAL
ERYTHROCYTE [DISTWIDTH] IN BLOOD BY AUTOMATED COUNT: 40 FL (ref 35.9–50)
EST. AVERAGE GLUCOSE BLD GHB EST-MCNC: 120 MG/DL
GFR SERPLBLD CREATININE-BSD FMLA CKD-EPI: 8 ML/MIN/1.73 M 2
GLUCOSE SERPL-MCNC: 100 MG/DL (ref 65–99)
HBA1C MFR BLD: 5.8 % (ref 4–5.6)
HCT VFR BLD AUTO: 34.7 % (ref 42–52)
HGB BLD-MCNC: 12 G/DL (ref 14–18)
MAGNESIUM SERPL-MCNC: 1.8 MG/DL (ref 1.5–2.5)
MCH RBC QN AUTO: 29.6 PG (ref 27–33)
MCHC RBC AUTO-ENTMCNC: 34.6 G/DL (ref 32.3–36.5)
MCV RBC AUTO: 85.5 FL (ref 81.4–97.8)
PHOSPHATE SERPL-MCNC: 5.4 MG/DL (ref 2.5–4.5)
PLATELET # BLD AUTO: 294 K/UL (ref 164–446)
PMV BLD AUTO: 9.8 FL (ref 9–12.9)
POTASSIUM SERPL-SCNC: 4.3 MMOL/L (ref 3.6–5.5)
RBC # BLD AUTO: 4.06 M/UL (ref 4.7–6.1)
SODIUM SERPL-SCNC: 134 MMOL/L (ref 135–145)
WBC # BLD AUTO: 10.4 K/UL (ref 4.8–10.8)

## 2024-03-07 PROCEDURE — 70498 CT ANGIOGRAPHY NECK: CPT

## 2024-03-07 PROCEDURE — 700111 HCHG RX REV CODE 636 W/ 250 OVERRIDE (IP): Mod: JZ | Performed by: INTERNAL MEDICINE

## 2024-03-07 PROCEDURE — 99233 SBSQ HOSP IP/OBS HIGH 50: CPT | Performed by: STUDENT IN AN ORGANIZED HEALTH CARE EDUCATION/TRAINING PROGRAM

## 2024-03-07 PROCEDURE — 83036 HEMOGLOBIN GLYCOSYLATED A1C: CPT

## 2024-03-07 PROCEDURE — 700102 HCHG RX REV CODE 250 W/ 637 OVERRIDE(OP): Performed by: STUDENT IN AN ORGANIZED HEALTH CARE EDUCATION/TRAINING PROGRAM

## 2024-03-07 PROCEDURE — 700111 HCHG RX REV CODE 636 W/ 250 OVERRIDE (IP): Mod: JG

## 2024-03-07 PROCEDURE — 36415 COLL VENOUS BLD VENIPUNCTURE: CPT

## 2024-03-07 PROCEDURE — 700111 HCHG RX REV CODE 636 W/ 250 OVERRIDE (IP): Mod: JZ | Performed by: STUDENT IN AN ORGANIZED HEALTH CARE EDUCATION/TRAINING PROGRAM

## 2024-03-07 PROCEDURE — 99222 1ST HOSP IP/OBS MODERATE 55: CPT | Performed by: PSYCHIATRY & NEUROLOGY

## 2024-03-07 PROCEDURE — 70496 CT ANGIOGRAPHY HEAD: CPT

## 2024-03-07 PROCEDURE — A9270 NON-COVERED ITEM OR SERVICE: HCPCS

## 2024-03-07 PROCEDURE — 83735 ASSAY OF MAGNESIUM: CPT

## 2024-03-07 PROCEDURE — 700117 HCHG RX CONTRAST REV CODE 255: Performed by: STUDENT IN AN ORGANIZED HEALTH CARE EDUCATION/TRAINING PROGRAM

## 2024-03-07 PROCEDURE — A9270 NON-COVERED ITEM OR SERVICE: HCPCS | Performed by: INTERNAL MEDICINE

## 2024-03-07 PROCEDURE — 700102 HCHG RX REV CODE 250 W/ 637 OVERRIDE(OP)

## 2024-03-07 PROCEDURE — 770020 HCHG ROOM/CARE - TELE (206)

## 2024-03-07 PROCEDURE — 700102 HCHG RX REV CODE 250 W/ 637 OVERRIDE(OP): Performed by: INTERNAL MEDICINE

## 2024-03-07 PROCEDURE — 80069 RENAL FUNCTION PANEL: CPT

## 2024-03-07 PROCEDURE — 700102 HCHG RX REV CODE 250 W/ 637 OVERRIDE(OP): Performed by: HOSPITALIST

## 2024-03-07 PROCEDURE — A9270 NON-COVERED ITEM OR SERVICE: HCPCS | Performed by: HOSPITALIST

## 2024-03-07 PROCEDURE — A9270 NON-COVERED ITEM OR SERVICE: HCPCS | Performed by: STUDENT IN AN ORGANIZED HEALTH CARE EDUCATION/TRAINING PROGRAM

## 2024-03-07 PROCEDURE — 85027 COMPLETE CBC AUTOMATED: CPT

## 2024-03-07 PROCEDURE — 93010 ELECTROCARDIOGRAM REPORT: CPT | Performed by: INTERNAL MEDICINE

## 2024-03-07 RX ORDER — FUROSEMIDE 10 MG/ML
80 INJECTION INTRAMUSCULAR; INTRAVENOUS
Status: DISCONTINUED | OUTPATIENT
Start: 2024-03-08 | End: 2024-03-09

## 2024-03-07 RX ORDER — BISACODYL 10 MG
10 SUPPOSITORY, RECTAL RECTAL
Status: DISCONTINUED | OUTPATIENT
Start: 2024-03-07 | End: 2024-03-07

## 2024-03-07 RX ORDER — ATORVASTATIN CALCIUM 80 MG/1
80 TABLET, FILM COATED ORAL EVERY EVENING
Status: DISCONTINUED | OUTPATIENT
Start: 2024-03-08 | End: 2024-03-08

## 2024-03-07 RX ORDER — FUROSEMIDE 10 MG/ML
40 INJECTION INTRAMUSCULAR; INTRAVENOUS
Status: DISCONTINUED | OUTPATIENT
Start: 2024-03-07 | End: 2024-03-07

## 2024-03-07 RX ORDER — FUROSEMIDE 10 MG/ML
40 INJECTION INTRAMUSCULAR; INTRAVENOUS ONCE
Status: COMPLETED | OUTPATIENT
Start: 2024-03-07 | End: 2024-03-07

## 2024-03-07 RX ORDER — LABETALOL 200 MG/1
200 TABLET, FILM COATED ORAL 3 TIMES DAILY
Status: DISCONTINUED | OUTPATIENT
Start: 2024-03-07 | End: 2024-03-08

## 2024-03-07 RX ORDER — ACETAMINOPHEN 500 MG
1000 TABLET ORAL 3 TIMES DAILY PRN
Status: DISCONTINUED | OUTPATIENT
Start: 2024-03-07 | End: 2024-03-14 | Stop reason: HOSPADM

## 2024-03-07 RX ADMIN — TAMSULOSIN HYDROCHLORIDE 0.4 MG: 0.4 CAPSULE ORAL at 04:32

## 2024-03-07 RX ADMIN — ATORVASTATIN CALCIUM 20 MG: 20 TABLET, FILM COATED ORAL at 16:03

## 2024-03-07 RX ADMIN — METOPROLOL TARTRATE 12.5 MG: 25 TABLET, FILM COATED ORAL at 04:32

## 2024-03-07 RX ADMIN — LABETALOL HYDROCHLORIDE 200 MG: 200 TABLET, FILM COATED ORAL at 21:12

## 2024-03-07 RX ADMIN — LABETALOL HYDROCHLORIDE 10 MG: 5 INJECTION, SOLUTION INTRAVENOUS at 04:32

## 2024-03-07 RX ADMIN — DOCUSATE SODIUM 50 MG AND SENNOSIDES 8.6 MG 2 TABLET: 8.6; 5 TABLET, FILM COATED ORAL at 16:03

## 2024-03-07 RX ADMIN — IOHEXOL 90 ML: 350 INJECTION, SOLUTION INTRAVENOUS at 14:04

## 2024-03-07 RX ADMIN — FUROSEMIDE 40 MG: 10 INJECTION INTRAMUSCULAR; INTRAVENOUS at 17:23

## 2024-03-07 RX ADMIN — FUROSEMIDE 40 MG: 10 INJECTION INTRAMUSCULAR; INTRAVENOUS at 15:25

## 2024-03-07 RX ADMIN — FUROSEMIDE 40 MG: 10 INJECTION INTRAMUSCULAR; INTRAVENOUS at 07:42

## 2024-03-07 RX ADMIN — ONDANSETRON 4 MG: 2 INJECTION INTRAMUSCULAR; INTRAVENOUS at 18:57

## 2024-03-07 RX ADMIN — ACETAMINOPHEN 1000 MG: 500 TABLET ORAL at 04:32

## 2024-03-07 RX ADMIN — OXYCODONE 5 MG: 5 TABLET ORAL at 16:04

## 2024-03-07 ASSESSMENT — PATIENT HEALTH QUESTIONNAIRE - PHQ9
1. LITTLE INTEREST OR PLEASURE IN DOING THINGS: NOT AT ALL
2. FEELING DOWN, DEPRESSED, IRRITABLE, OR HOPELESS: NOT AT ALL
SUM OF ALL RESPONSES TO PHQ9 QUESTIONS 1 AND 2: 0
1. LITTLE INTEREST OR PLEASURE IN DOING THINGS: NOT AT ALL
2. FEELING DOWN, DEPRESSED, IRRITABLE, OR HOPELESS: NOT AT ALL
SUM OF ALL RESPONSES TO PHQ9 QUESTIONS 1 AND 2: 0

## 2024-03-07 ASSESSMENT — PAIN DESCRIPTION - PAIN TYPE
TYPE: ACUTE PAIN
TYPE: ACUTE PAIN

## 2024-03-07 ASSESSMENT — ENCOUNTER SYMPTOMS
BACK PAIN: 0
MYALGIAS: 0
FLANK PAIN: 0
NECK PAIN: 0
SENSORY CHANGE: 1
NERVOUS/ANXIOUS: 1

## 2024-03-07 NOTE — PROGRESS NOTES
[facility] Medicine Daily Progress Note    Date of Service  3/6/2024    Chief Complaint  Kaleb Yanes is a 73 y.o. male admitted 3/1/2024 with flank pain    Hospital Course  Kaleb Yanes is a 73 y.o. male who presented 3/1/2024 with acute renal failure due to ureteral obstruction.  The patient lives in [facility] had been having increased left-sided flank pain and back pain for which she has been taking ibuprofen this morning he woke up and had 3 episodes of hematuria and went to the emergency department where he was found to have acute renal failure and hyperkalemia 2 weeks ago his creatinine was 1.37 and on day of admission it was 7, potassium was 5.4.     Patient stated he initially attributed his flank pain and back pain to his bony metastases from his prostate cancer.  He would not have gone into the emergency room except for the hematuria, after the initial 3 episodes however his hematuria has now resolved, he denies any fever, chills, dysuria he thinks he has been making a normal amount of urine.  His appetite has been poor since beginning of the year but his family has been through a lot of stress and he thinks that his appetite is starting to improve he has not had any nausea or vomiting he does not describe anything that sounds like renal colic.    Interval Problem Update    MINNIE ON  He feels well. Eager to go home.  Has some Right flank pain at the PNT.  The urine is improving in clarity and less bloody.  He denies other pain, dyspnea.  BMP reviewed, Cr slight improvement to 6.8, bicarb 25.  P 4.7.  He dislikes how restrictive the renal diet is.    I have discussed this patient's plan of care and discharge plan at IDT rounds today with Case Management, Nursing, Nursing leadership, and other members of the IDT team.    Consultants/Specialty  Urology  Nephrology  IR    Code Status  DNAR/DNI    Disposition  The patient is not medically cleared for discharge to home or a post-acute  facility.  Anticipate discharge to: home with close outpatient follow-up    I have placed the appropriate orders for post-discharge needs.    Review of Systems  Review of Systems   Gastrointestinal:  Negative for nausea and vomiting.   Genitourinary:  Positive for flank pain and hematuria.   Musculoskeletal:  Negative for back pain, joint pain, myalgias and neck pain.        Physical Exam  Temp:  [36.6 °C (97.8 °F)-36.9 °C (98.5 °F)] 36.9 °C (98.5 °F)  Pulse:  [] 82  Resp:  [17-18] 17  BP: (140-178)/(86-93) 150/87  SpO2:  [90 %-93 %] 93 %    Physical Exam  Vitals and nursing note reviewed. Exam conducted with a chaperone present (Wife at bedside).   Constitutional:       General: He is not in acute distress.     Appearance: Normal appearance. He is not ill-appearing, toxic-appearing or diaphoretic.   HENT:      Head: Normocephalic.      Nose: Nose normal.      Mouth/Throat:      Mouth: Mucous membranes are moist.   Eyes:      General: No scleral icterus.     Conjunctiva/sclera: Conjunctivae normal.   Cardiovascular:      Rate and Rhythm: Normal rate and regular rhythm.      Pulses: Normal pulses.      Heart sounds: Normal heart sounds. No murmur heard.     No friction rub. No gallop.   Pulmonary:      Effort: Pulmonary effort is normal. No respiratory distress.      Breath sounds: Normal breath sounds. No wheezing, rhonchi or rales.   Abdominal:      General: Abdomen is flat. Bowel sounds are normal. There is no distension.      Palpations: Abdomen is soft.      Tenderness: There is no abdominal tenderness. There is no guarding or rebound.      Comments: R nephrostomy tube cdi with mild associated hematuria   Genitourinary:     Comments: R nephrostomy with blood-tinged yellow clear urine  Musculoskeletal:      Cervical back: Neck supple.      Right lower leg: No edema.      Left lower leg: No edema.   Skin:     General: Skin is warm and dry.      Capillary Refill: Capillary refill takes less than 2 seconds.       Comments: Right PNT site c/d/I, minimally tender   Neurological:      Mental Status: He is alert.      Comments: Appropriately conversant   Psychiatric:         Mood and Affect: Mood normal.         Behavior: Behavior normal.         Thought Content: Thought content normal.         Judgment: Judgment normal.         Fluids    Intake/Output Summary (Last 24 hours) at 3/6/2024 1659  Last data filed at 3/6/2024 0930  Gross per 24 hour   Intake 240 ml   Output 750 ml   Net -510 ml       Laboratory  Recent Labs     03/04/24 0545 03/05/24  0553 03/06/24  0149   WBC 11.3* 11.7* 11.0*   RBC 5.01 4.06* 4.28*   HEMOGLOBIN 14.7 12.3* 12.6*   HEMATOCRIT 44.3 34.3* 36.7*   MCV 88.4 84.5 85.7   MCH 29.3 30.3 29.4   MCHC 33.2 35.9 34.3   RDW 42.4 39.7 40.1   PLATELETCT 339 293 325   MPV 9.4 9.6 9.9     Recent Labs     03/04/24 0545 03/05/24  0553 03/06/24  0149   SODIUM 136 135 135   POTASSIUM 5.0 4.5 4.8   CHLORIDE 102 98 92*   CO2 14* 22 25   GLUCOSE 84 145* 98   BUN 51* 58* 58*   CREATININE 5.13* 7.14* 6.79*   CALCIUM 8.8 7.8* 8.5                     Imaging  IR-PERQ NEPH CATH NEW ACCESS (ALL RADIOLOGY) RIGHT   Final Result         1. Ultrasound And Fluoroscopic Guided Placement Of a right sided 8 Armenian  Pigtail Locking Loop Percutaneous Nephrostomy Catheter.      2. Obstruction of the distal right ureter is demonstrated with no flow of contrast into the urinary bladder.      2. Nephrostogram Showing Satisfactory Catheter Position Without Extravasation.      NM-RENAL WITH DIURETIC WASHOUT   Final Result      1. Normal renal flow and function scan of the left kidney.   2. Decreased uptake and excretion of the radiotracer by the right kidney. Findings suggest chronic right hydroureteronephrosis at the distal ureteral level with resultant right renal cortical atrophy.      OUTSIDE IMAGES-CT ABDOMEN /PELVIS   Final Result           Assessment/Plan  Constipation- (present on admission)  Assessment & Plan  Resolved  Continue  bowel regimen    High anion gap metabolic acidosis- (present on admission)  Assessment & Plan  Due to NEREIDA  Resolved with iv bicarb  following    NEREIAD (acute kidney injury) (HCC)- (present on admission)  Assessment & Plan  Due to obstruction from obstructing stone and suspected intrinsic renal disease contributing, had abnormal mag scan - nephrostomy tube in place  Nephrology consulted, no indication for RRT  Continue bicarb drip  Avoid nephrotoxins  Will check bmp in am    Hydronephrosis with urinary obstruction due to renal calculus- (present on admission)  Assessment & Plan  Vs stricture  See above  Johansen and nephrostomy R tube in place  Urology following  IR following        Gross hematuria- (present on admission)  Assessment & Plan  Resolved  Due percutaneous nephrostomy  Holding ASA for nephrostomy tube and due to hematuria    Hyperkalemia- (present on admission)  Assessment & Plan  Due to NEREIDA  Resolved  Bmp in am    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 (HCC)- (present on admission)  Assessment & Plan   & Metastatic to bones  Urology following and managing, also seeing radiation oncology in [place]           VTE prophylaxis: SCD    I have performed a physical exam and reviewed and updated ROS and Plan today (3/6/2024). In review of yesterday's note (3/5/2024), there are no changes except as documented above.

## 2024-03-07 NOTE — PROGRESS NOTES
"     Urology Progress Note    Patient seen and examined    Overnight Events: None    S:     3/7- no acute urologic events overnight. NAD. Cr 6.8(6.79) RIGHT NPT WITH 1200CC/24H. DEVELOPED AFIB WITH RVR LAST NIGHT.     3/6. No overnight events. Patient resting comfortably in bed, tolerating PO this AM. Cr slightly improved overnight to 6.79 (7.14). Patient is voiding well without arreola catheter. Per nursing PVR with each void has been <100 cc. NPT had output of  400 cc/24 hours, clear yellow urine with minimal pink tinge present in nephrostomy bag.    3/5. No overnight events. Cr continues to worsen, now 7.14. Catheter displaced yesterday afternoon, nursing failed to replace. Per nurse, post void bladder scans have been 0cc. NPT with 775cc/24hrs, appears pink tinged. Minimal PO fluid intake per patient.     3/4. No fevers, chills, nausea or vomiting.  S/p R NPT placement by IR earlier this morning, urine in NPT and arreola is watermelon colored without clot. Cr 5.13 (4.0), 1.1L UOP in 24h. Denies flank pain.    O:   BP (!) 163/96   Pulse 90   Temp 36.7 °C (98 °F) (Temporal)   Resp 19   Ht 1.702 m (5' 7\")   Wt 60.4 kg (133 lb 2.5 oz)   SpO2 96%   Recent Labs     03/05/24  0553 03/06/24  0149 03/07/24  0047   SODIUM 135 135 134*   POTASSIUM 4.5 4.8 4.3   CHLORIDE 98 92* 92*   CO2 22 25 25   GLUCOSE 145* 98 100*   BUN 58* 58* 63*   CREATININE 7.14* 6.79* 6.80*   CALCIUM 7.8* 8.5 8.0*     Recent Labs     03/05/24  0553 03/06/24  0149 03/07/24  0047   WBC 11.7* 11.0* 10.4   RBC 4.06* 4.28* 4.06*   HEMOGLOBIN 12.3* 12.6* 12.0*   HEMATOCRIT 34.3* 36.7* 34.7*   MCV 84.5 85.7 85.5   MCH 30.3 29.4 29.6   MCHC 35.9 34.3 34.6   RDW 39.7 40.1 40.0   PLATELETCT 293 325 294   MPV 9.6 9.9 9.8         Intake/Output Summary (Last 24 hours) at 3/3/2024 0911  Last data filed at 3/3/2024 0653  Gross per 24 hour   Intake 120 ml   Output 800 ml   Net -680 ml       Exam:  Abdomen soft, benign.           A/P:    Active Hospital Problems "    Diagnosis     Constipation [K59.00]     High anion gap metabolic acidosis [E87.29]     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 (HCC) [C61, C77.2]     Hyperkalemia [E87.5]     Gross hematuria [R31.0]     Hydronephrosis with urinary obstruction due to renal calculus [N13.2]     NEREIDA (acute kidney injury) (HCC) [N17.9]        - Renal function showed minor improvement with NPT, nephrology follwing.  - Trend serum Cr, UOP NPT  - Bladder scan 2x per shift to ensure emptying. Will continue to monitor.  - Continue daily tamsulosin 0.4mg  - Urology  signing off with no further recs from our service. He will discharge with left neph tube in place.          Mireya John, P.A.-C.   [address] [place] [address]   [phone]

## 2024-03-07 NOTE — CARE PLAN
The patient is Stable - Low risk of patient condition declining or worsening    Shift Goals  Clinical Goals: bladder decompression  Patient Goals: comfort, no arreola needed  Family Goals: Comfort and rest.    Progress made toward(s) clinical / shift goals:  education done POC< all questions and concerns were addressed. Nephrostomy tube draining well    Patient is not progressing towards the following goals:      Problem: Knowledge Deficit - Standard  Goal: Patient and family/care givers will demonstrate understanding of plan of care, disease process/condition, diagnostic tests and medications  Outcome: Progressing     Problem: Mobility  Goal: Patient's capacity to carry out activities will improve  Outcome: Progressing     Problem: Self Care  Goal: Patient will have the ability to perform ADLs independently or with assistance (bathe, groom, dress, toilet and feed)  Outcome: Progressing

## 2024-03-07 NOTE — DOCUMENTATION QUERY
[facility]                                                                       Query Response Note      PATIENT:               MASON JIMÉNEZ  ACCT #:                  [id number]  MRN:                     [id number]  :                      1950  ADMIT DATE:       3/1/2024 8:18 AM  DISCH DATE:          RESPONDING  PROVIDER #:        [id number]           QUERY TEXT:    Documentation within the record reveals Likely cause of NEREIDA: Post renal obstruction, VS ATN. Based on the above findings and further workup can ATN be further specified.      The patient's Clinical Indicators include:  Findings: 3/5 PN: worsening left sided flank and back pain x 2 days. At OSH he was diagnosed with acute renal failure with presenting Crt of 7 and was transported to [facility] for higher level of care on 3/1/24. Previous Crt level noted to be 0.92 in  2022 and 2 weeks prior to admission his creatinine was 1.37 . Baseline creatinine 1.37 Likely cause of NEREIDA: Post renal obstruction, VS ATN (of note, patient was taking NSAIDs for flank pain)    Labs 3/1: creatinine 4.92, 3/2 creatinine 3.84, 3/3 creatinine 4.00, 3/4 creatinine 5.13, 3/5 7.14, 3/6 creatinine 6.79, initially had arreola placed NSAID useage    Treatment: nephrostomy tube nephrology consult history of ckd 3   3/5 PN: Change IVF to 1/2NS with 75meq sodium bicarbonate at 75mL/hr    Casandra Núñez RN BSN  Clinical Documentation   [email or web address]  Connect via [email or web address] Messenger  Options provided:   -- Acute tubular necrosis is ruled in   -- Acute tubular necrosis is ruled out   -- Other explanation, (please specify other explanation)      Query created by: Casandra Mercedes on 3/6/2024 8:32 AM    RESPONSE TEXT:    Other explanation - Cannot rule it in or out without biopsy.          Electronically signed by:  TORRI PALACIOS MD 3/7/2024 6:54 AM

## 2024-03-07 NOTE — PROGRESS NOTES
Received report from off-going nurse.   Assumed pt care at change of shift.   Assessment completed.   Pt is A&Ox 4, vital signs are stable on 2L of oxygen.   Denies pain, no apparent signs of discomfort.   Bed is in low and locked position, call light and belongings in reach, reminded to use call light, bed alarm on.   No needs at this time.

## 2024-03-07 NOTE — DISCHARGE INSTRUCTIONS
"Thank you for coming to [facility].  It has been my pleasure to take care of you!      -Please follow-up with your primary care provider in 3 to 5 days with labs.  -Please follow-up with [facility] in 2 weeks with labs.      Ok to shower with catheter as long as site is covered with waterproof dressing; change dressing if it becomes wet.  - No baths or submerging site under water until catheter removed   -Urology to manage NT as outpt  - For long term use, Nephrostomy tube will need to be exchanged every 3 months; sooner prn- by Urology Ischemic Stroke  Discharge Instructions    You experienced an Ischemic Stroke.  Ischemic stroke is the most common type of stroke and happens when an artery in the brain becomes blocked by a plaque fragment or blood clot. Typically, these blockages travel from the heart or larger arteries that supply the brain.  The brain needs a constant supply of blood, which carries oxygen and nutrients it needs to function.  A stroke occurs when one of these arteries to the brain is either blocked or bursts. As a result, part of the brain does not get the blood it needs, so it starts to die.     Stroke Risk Factors    You are at increased risk of having another stroke event.  See your Patient Stroke Guide to help reduce your stroke risk. These are your specific risk factors:  Age - Over 55  Cardiovascular disease  Gender - Men are at a higher risk than women  High blood pressure  High Cholesterol and lipids     Get help right away if you have any signs of a stroke.  \"BE FAST\" is an easy way to remember the main warning signs of a stroke:  B - Balance. Dizziness, sudden trouble walking, or loss of balance.  E - Eyes. Trouble seeing or a change in how you see.  F - Face. Sudden weakness or loss of feeling in the face. The face or eyelid may droop on one side.  A - Arms. Weakness or loss of feeling in an arm. This happens all of a sudden and most often on one side of the body.  S - Speech. Sudden " trouble speaking, slurred speech, or trouble understanding what people say.  T - Time. Time to call emergency services. Write down what time symptoms started.

## 2024-03-07 NOTE — PROGRESS NOTES
NOC APRN CROSS COVER NOTE      Notified by prior NP regarding patient with complaints of shortness of breath and period of tachycardia. Chest x-ray pending. I was notified by primary RN that patient remains tachycardic and appears to be irregular on Zoll monitor. EKG ordered.     Chest x-ray without acute process.    EKG demonstrates sinus tachycardia with rate of 106 with PACs.    Upon review, patient has been persistently hypertensive with SBP >150 and occasionally tachycardic in the low 100s. Will initiate low dose PO metoprolol, 12.5 mg BID. IV labetalol ordered PRN for SBP >165. Additional AM labs ordered.       Yvette Jacobson [id number], NOC Hospitalist APRN

## 2024-03-07 NOTE — PROGRESS NOTES
"Radiology Progress Note   Author: JUAREZ Villafana Date & Time created: 3/6/2024 0910   Date of admission  3/1/2024  Note to reader: this note follows the APSO format rather than the historical SOAP format. Assessment and plan located at the top of the note for ease of use.    Chief Complaint  73 y.o. male admitted 3/1/2024 with right sided back pain    HPI   Kaleb Yanes is a 74 yo male with PMH significant for prostate cancer, CAD s/p coronary artery stent placemen (8/2007), HTN, and HLD who presented to OSH with hematuria associated increasing left sided flank and back pain and was diagnosed with acute renal failure. Pt was transported to [facility] for higher level of care. 3/1/24 Lasix renal scan with diuretic washout revealed normal renal flow and function of the left kidney with decreased uptake and excretion by the right kidney. \"Findings suggest chronic right hydroureteronephrosis at the distal ureteral level with resultant right renal cortical atrophy\" (Avinash Crystal MD) 3/3/24 IR was consulted and Dr. Waite from IR performed a right nephrostogram with 8 fr nephrostomy tube placement on 03/04/24.     Interval History:   03/04/24- Pt  not in room undergoing right nephrostogram with right 8 fr nephrostomy tube placement  For maximal decompression of kidney  MAG3 shows decreased renal function.    03/05/24-right #8 Austrian nephrostomy tube with 775 mL of pink urine, without clots, output in the last 24 hours.  Ordered placed for RNs to flush nephrostomy tube only for bloody urine or urine with clots.  Renal function continues to worsen despite nephrostomy tube placement  I reviewed the most recent labs: WBC 11.7; Hgb  12.3,  Cr 7.14  Coags INR 1.19,      03/06/24-right #8 Austrian nephrostomy tube with 400 mL of pink urine, without clots, output in the last 24 hours.  Ordered placed for RNs to flush nephrostomy tube ONLY for bloody urine or urine with clots.Discussed drain care/management  with pt " and family at bedside.   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)    Hyperkalemia    Gross hematuria    Hydronephrosis with urinary obstruction due to renal calculus    NEREIDA (acute kidney injury) (HCC)    Metabolic acidosis    Constipation      Plan IR  -Follow renal function/assess renal function for improvement post nephrostomy tube placement  -Possible urology intervention if creatinine improves with NT-urology following  -  Irrigate right Nephrostomy tube with 10 ml of sterile saline q shift ONLY IF no output of bloody output  - Fluid cultures pending    - Education provided on S/S of infection   - Ok to shower with catheter as long as site is covered with waterproof dressing; change dressing if it becomes wet.  - No baths or submerging site under water until catheter removed   - If no procedures will be done by Urology then okay to dc with Nephrostomy tube if okay with Urology and hospitalist- Urology  to manage NT as outpt  - For long term use, Nephrostomy tube will need to be exchanged every 3 months; sooner prn- by Urology   - IR will continue to follow nephrostomy tube while pt is in the hospital; we may not round every day     -Thank you for allowing Interventional Radiology team to participate in the patients care, if any additional care or requests are needed in the future please do not hesitate call or place IR order. [phone]           Review of Systems  Physical Exam   Review of Systems   Constitutional:  Positive for malaise/fatigue. Negative for chills and fever.   HENT:  Negative for hearing loss.    Eyes:  Negative for blurred vision.   Respiratory:  Negative for cough and shortness of breath.    Cardiovascular:  Negative for chest pain.   Gastrointestinal:  Negative for abdominal pain, heartburn,  "nausea and vomiting.   Genitourinary:  Positive for dysuria.        Right flank pain at NT site-   \"The urine in my urine is clear that my nephrostomy is bloody\"   Musculoskeletal:         Bone pain   Neurological:  Negative for dizziness, speech change, focal weakness and headaches.      Vitals:    03/06/24 1526   BP: (!) 150/87   Pulse: 82   Resp: 17   Temp: 36.9 °C (98.5 °F)   SpO2: 93%        Physical Exam  Vitals and nursing note reviewed.   Constitutional:       Appearance: Normal appearance.   HENT:      Head: Normocephalic and atraumatic.      Nose: Nose normal.      Mouth/Throat:      Mouth: Mucous membranes are moist.      Pharynx: Oropharynx is clear.   Eyes:      Pupils: Pupils are equal, round, and reactive to light.   Cardiovascular:      Rate and Rhythm: Normal rate.   Pulmonary:      Effort: Pulmonary effort is normal.   Abdominal:      General: Abdomen is flat.      Palpations: Abdomen is soft.   Genitourinary:     Comments: Right nephrostomy tube with dark pink drainage no clots  Right nephrostomy tube site CDI  Skin:     Capillary Refill: Capillary refill takes less than 2 seconds.   Neurological:      Mental Status: He is alert and oriented to person, place, and time. Mental status is at baseline.      Motor: No weakness.             Labs    Recent Labs     03/04/24  0545 03/05/24  0553 03/06/24  0149   WBC 11.3* 11.7* 11.0*   RBC 5.01 4.06* 4.28*   HEMOGLOBIN 14.7 12.3* 12.6*   HEMATOCRIT 44.3 34.3* 36.7*   MCV 88.4 84.5 85.7   MCH 29.3 30.3 29.4   MCHC 33.2 35.9 34.3   RDW 42.4 39.7 40.1   PLATELETCT 339 293 325   MPV 9.4 9.6 9.9     Recent Labs     03/04/24  0545 03/05/24  0553 03/06/24  0149   SODIUM 136 135 135   POTASSIUM 5.0 4.5 4.8   CHLORIDE 102 98 92*   CO2 14* 22 25   GLUCOSE 84 145* 98   BUN 51* 58* 58*   CREATININE 5.13* 7.14* 6.79*   CALCIUM 8.8 7.8* 8.5     Recent Labs     03/04/24  0545 03/05/24  0553 03/06/24  0149   ALBUMIN 3.6  --  3.2   TBILIRUBIN 0.4  --   --    ALKPHOSPHAT " "289*  --   --    TOTPROTEIN 7.2  --   --    ALTSGPT 14  --   --    ASTSGOT 33  --   --    CREATININE 5.13* 7.14* 6.79*     IR-PERQ NEPH CATH NEW ACCESS (ALL RADIOLOGY) RIGHT   Final Result         1. Ultrasound And Fluoroscopic Guided Placement Of a right sided 8 [place]  Pigtail Locking Loop Percutaneous Nephrostomy Catheter.      2. Obstruction of the distal right ureter is demonstrated with no flow of contrast into the urinary bladder.      2. Nephrostogram Showing Satisfactory Catheter Position Without Extravasation.      NM-RENAL WITH DIURETIC WASHOUT   Final Result      1. Normal renal flow and function scan of the left kidney.   2. Decreased uptake and excretion of the radiotracer by the right kidney. Findings suggest chronic right hydroureteronephrosis at the distal ureteral level with resultant right renal cortical atrophy.      OUTSIDE IMAGES-CT ABDOMEN /PELVIS   Final Result        INR   Date Value Ref Range Status   03/03/2024 1.19 (H) 0.87 - 1.13 Final     Comment:     INR - Non-therapeutic Reference Range: 0.87-1.13  INR - Therapeutic Reference Range: 2.0-4.0       No results found for: \"POCINR\"     Intake/Output Summary (Last 24 hours) at 3/4/2024 1535  Last data filed at 3/4/2024 0332  Gross per 24 hour   Intake 120 ml   Output 800 ml   Net -680 ml      Labs not explicitly included in this progress note were reviewed by the author. Radiology/imaging not explicitly included in this progress note was reviewed by the author.                    INR   Date Value Ref Range Status   03/02/2024 1.21 (H) 0.87 - 1.13 Final       Comment:       INR - Non-therapeutic Reference Range: 0.87-1.13  INR - Therapeutic Reference Range: 2.0-4.0         No results found for: \"POCINR\"      Intake/Output Summary (Last 24 hours) at 3/3/2024 1626  Last data filed at 3/3/2024 1200      Gross per 24 hour   Intake 28 ml   Output 1997.5 ml   Net -1969.5 ml       Labs not explicitly included in this progress note were reviewed by " the author. Radiology/imaging not explicitly included in this progress note was reviewed by the author.      I have performed a physical exam and reviewed and updated ROS and Plan today (3/5/2024).      35 minutes in directly providing and coordinating care and extensive data review.  No time overlap and excludes procedures.

## 2024-03-07 NOTE — CARE PLAN
The patient is Watcher - Medium risk of patient condition declining or worsening    Shift Goals  Clinical Goals: Pt's vitals will remain stable throughout shift.  Patient Goals: ambulate, comfort  Family Goals: go home    Pt had a hypertensive episode once today. Pt successfully performed a walking O2 test, with stable O2 sats. Pts heart rate remained stable at low 100s throughout shift. Pt safely ambulated multiple times throughout shift with no assistance required.     Progress made toward(s) clinical / shift goals:      Problem: Knowledge Deficit - Standard  Goal: Patient and family/care givers will demonstrate understanding of plan of care, disease process/condition, diagnostic tests and medications  Outcome: Progressing     Problem: Urinary Elimination  Goal: Establish and maintain regular urinary output  Outcome: Progressing     Problem: Mobility  Goal: Patient's capacity to carry out activities will improve  Outcome: Progressing     Problem: Self Care  Goal: Patient will have the ability to perform ADLs independently or with assistance (bathe, groom, dress, toilet and feed)  Outcome: Progressing     Problem: Pain - Standard  Goal: Alleviation of pain or a reduction in pain to the patient’s comfort goal  Outcome: Progressing       Patient is not progressing towards the following goals: NA

## 2024-03-07 NOTE — ASSESSMENT & PLAN NOTE
Due to obstruction from obstructing stone and suspected intrinsic renal disease contributing  Plateau after recent NEREIDA suggestive of ATN  Worsened after CTA for occipital CVA due to contrast nephropathy  Nephrology consulted, no indication for RRT  Bicarb gtt discontinued due to noncardiogenic pulmonary edema  Avoid nephrotoxins  Repeat AM BMP  Kidney function not improving  US renal ordered. Discussed with nephrologist Dr Carreno     Patient going for nephrostomy tube placement on the left side, discussed with vascular surgery Dr. Sultana for temporary dialysis catheter access

## 2024-03-07 NOTE — PROGRESS NOTES
[facility] -  PROGRESS NOTE               Author: Kemi Kelly D.O. Date & Time: 3/7/2024  7:25 AM     HPI:  74 yo male from [facility] with PMH significant for metastatic prostate cancer -  on hormone blocking medication, due for next injection in March. , CAD s/p coronary artery stent placemen (8/2007), HTN, and HLD who presented to OSH with hematuria associated with worsening left sided flank and back pain x 2 days. At OSH he was diagnosed with acute renal failure with presenting Crt of 7 and was transported to [facility] for higher level of care on 3/1/24. Previous Crt level noted to be 0.92 in July 2022 and 2 weeks prior to admission his creatinine was 1.37 . On arrival he had a Lasix renal scan with diuretic which revealed normal renal flow and function of the left kidney with decreased uptake and excretion by the right kidney; and findings suggestive of chronic right hydroureteronephrosis at the distal ureteral level with resultant right renal cortical atrophy. Urology was consulted and recommended a Arreola catheter placement. His Crt improved to 4.92 and further to 4.0 before worsening again to 5.13 today. A Rt Nephrostomy tube was placed by AR today to see if this will help improve renal functions. His flank pain has since resolved. Hematuria has cleared up as well. No F/C/N/V/CP/SOB.  No melena, hematochezia, hematemesis.  No HA, visual changes, or abdominal pain. Nephrology consulted for management of NEREIDA and possible need for dialysis      DAILY NEPHROLOGY SUMMARY:  03/04 - consult done  03/05 - SCr trended up, arreola dislodged and not replaced due to PVR 0, SBP 140s-160s, UOP 1075mL, no freely voided urine recorded, UA with moderate occult blood, 0-2 hyaline casts, no protein, no new c/o, reports that he is finally voiding and urine via bladder is clearing  03/06 - SCr trending down, UOP 970mL recorded, SBP 140s-170s, no new c/o, has been walking laps, wife encouraging him to  "increase PO intake  03/07 - SCr stable, UOP 1600mL, SBP 130s-160s, has c/o SOB overnight, mild tachypnea noted, CXR with some increased interstitial markings, IVF stopped this AM, reports now SOB improved and good UOP via NPT, +constipation ongoing, no other new c/o    REVIEW OF SYSTEMS:    10 point ROS reviewed and is as per HPI/daily summary or otherwise negative    PMH/PSH/SH/FH: Reviewed and unchanged since admission note  CURRENT MEDICATIONS: Reviewed from admission to present day    VS:  BP (!) 142/95   Pulse 85   Temp 36.3 °C (97.3 °F) (Temporal)   Resp 18   Ht 1.702 m (5' 7\")   Wt 60.4 kg (133 lb 2.5 oz)   SpO2 95%   BMI 20.86 kg/m²   Physical Exam  Vitals and nursing note reviewed.   HENT:      Head: Normocephalic and atraumatic.      Right Ear: External ear normal.      Left Ear: External ear normal.      Nose: Nose normal.      Mouth/Throat:      Mouth: Mucous membranes are moist.      Pharynx: Oropharynx is clear.   Eyes:      General: No scleral icterus.  Cardiovascular:      Rate and Rhythm: Normal rate and regular rhythm.   Pulmonary:      Effort: Pulmonary effort is normal.      Breath sounds: Rales (L base) present. No wheezing.   Abdominal:      General: Abdomen is flat. There is no distension.   Musculoskeletal:      Right lower leg: No edema.      Left lower leg: No edema.   Skin:     General: Skin is warm and dry.      Findings: No bruising.   Neurological:      General: No focal deficit present.      Mental Status: He is alert and oriented to person, place, and time.      Cranial Nerves: No cranial nerve deficit.   Psychiatric:         Mood and Affect: Mood normal.         Behavior: Behavior normal.         Thought Content: Thought content normal.         Judgment: Judgment normal.         Fluids:  In: 400 [P.O.:400]  Out: 1600     LABS:  Recent Labs     03/05/24  0553 03/06/24  0149 03/07/24  0047   SODIUM 135 135 134*   POTASSIUM 4.5 4.8 4.3   CHLORIDE 98 92* 92*   CO2 22 25 25 "   GLUCOSE 145* 98 100*   BUN 58* 58* 63*   CREATININE 7.14* 6.79* 6.80*   CALCIUM 7.8* 8.5 8.0*     Imaging: reviewed    IMPRESSION:  # NEREIDA, improving              - baseline Crt around 1.37 per labs 2 weeks prior to admission  - Presenting Crt 7 at OSH, improved to 4.0 after arreola placement but has worsened again  - Likely cause of NEREIDA: Post renal obstruction, VS ATN (of note, patient was taking NSAIDs for flank pain)  # CKD 3, NOS  #Atrophic /Echogenic Rt kidney               - Lasix renal scan 3/1/24: Normal renal flow and function scan of the left kidney.              chronic right hydroureteronephrosis at the distal ureteral level with resultant right renal cortical atrophy; s/p R nephrostomy  # Mild Hyperkalemia   # Severe Metabolic Acidosis, improved  # Metastatic Prostate Cancer to bones               - started on Firmagon a month ago   # Gross Hematuria   # Constipation        PLAN:  - No compelling indication for RRT  -  Agree with DC IVF  - lasix 40mg IV x 2 ordered  - Monitor urine output and Rt Nephrostomy output closely   - Daily labs   - Not likely to require dialysis, but patient agreeable if needed  - Avoid Nephrotoxins, IV contrast  - Daily evaluation for RRT needs  - Dose all meds per eGFR < 15  - strict I/O  - he understands he is on a renal diet  - IS use encouraged    Other management per primary service

## 2024-03-07 NOTE — PROGRESS NOTES
Pt now resting comfortably on 2 L NC after prior episode of SOB and tachycardia. CXR ordered. EKG showing sinus tachy with AVCs. Pt given Metoprolol and blood pressure decreased to 133/96 and pulse decreased to 101. Pt educated about notifying nurse about any developing symptoms.

## 2024-03-08 ENCOUNTER — APPOINTMENT (OUTPATIENT)
Dept: CARDIOLOGY | Facility: MEDICAL CENTER | Age: 74
DRG: 673 | End: 2024-03-08
Attending: STUDENT IN AN ORGANIZED HEALTH CARE EDUCATION/TRAINING PROGRAM
Payer: MEDICARE

## 2024-03-08 ENCOUNTER — APPOINTMENT (OUTPATIENT)
Dept: RADIOLOGY | Facility: MEDICAL CENTER | Age: 74
DRG: 673 | End: 2024-03-08
Attending: PSYCHIATRY & NEUROLOGY
Payer: MEDICARE

## 2024-03-08 LAB
ANION GAP SERPL CALC-SCNC: 21 MMOL/L (ref 7–16)
BUN SERPL-MCNC: 72 MG/DL (ref 8–22)
CALCIUM SERPL-MCNC: 8.1 MG/DL (ref 8.5–10.5)
CHLORIDE SERPL-SCNC: 91 MMOL/L (ref 96–112)
CHOLEST SERPL-MCNC: 101 MG/DL (ref 100–199)
CO2 SERPL-SCNC: 24 MMOL/L (ref 20–33)
CREAT SERPL-MCNC: 8.95 MG/DL (ref 0.5–1.4)
GFR SERPLBLD CREATININE-BSD FMLA CKD-EPI: 6 ML/MIN/1.73 M 2
GLUCOSE SERPL-MCNC: 77 MG/DL (ref 65–99)
HDLC SERPL-MCNC: 33 MG/DL
LDLC SERPL CALC-MCNC: 48 MG/DL
LV EJECT FRACT  99904: 59
LV EJECT FRACT MOD 2C 99903: 57.24
LV EJECT FRACT MOD 4C 99902: 61.07
LV EJECT FRACT MOD BP 99901: 59.53
POTASSIUM SERPL-SCNC: 4.8 MMOL/L (ref 3.6–5.5)
SODIUM SERPL-SCNC: 136 MMOL/L (ref 135–145)
TRIGL SERPL-MCNC: 99 MG/DL (ref 0–149)

## 2024-03-08 PROCEDURE — A9270 NON-COVERED ITEM OR SERVICE: HCPCS | Performed by: STUDENT IN AN ORGANIZED HEALTH CARE EDUCATION/TRAINING PROGRAM

## 2024-03-08 PROCEDURE — 700102 HCHG RX REV CODE 250 W/ 637 OVERRIDE(OP): Performed by: INTERNAL MEDICINE

## 2024-03-08 PROCEDURE — 92610 EVALUATE SWALLOWING FUNCTION: CPT

## 2024-03-08 PROCEDURE — 96105 ASSESSMENT OF APHASIA: CPT

## 2024-03-08 PROCEDURE — 80061 LIPID PANEL: CPT

## 2024-03-08 PROCEDURE — A9270 NON-COVERED ITEM OR SERVICE: HCPCS | Performed by: INTERNAL MEDICINE

## 2024-03-08 PROCEDURE — 97535 SELF CARE MNGMENT TRAINING: CPT

## 2024-03-08 PROCEDURE — 97166 OT EVAL MOD COMPLEX 45 MIN: CPT

## 2024-03-08 PROCEDURE — 700111 HCHG RX REV CODE 636 W/ 250 OVERRIDE (IP): Mod: JZ | Performed by: STUDENT IN AN ORGANIZED HEALTH CARE EDUCATION/TRAINING PROGRAM

## 2024-03-08 PROCEDURE — 80048 BASIC METABOLIC PNL TOTAL CA: CPT

## 2024-03-08 PROCEDURE — 93306 TTE W/DOPPLER COMPLETE: CPT

## 2024-03-08 PROCEDURE — 93306 TTE W/DOPPLER COMPLETE: CPT | Mod: 26 | Performed by: INTERNAL MEDICINE

## 2024-03-08 PROCEDURE — 700101 HCHG RX REV CODE 250: Performed by: HOSPITALIST

## 2024-03-08 PROCEDURE — 700102 HCHG RX REV CODE 250 W/ 637 OVERRIDE(OP)

## 2024-03-08 PROCEDURE — A9270 NON-COVERED ITEM OR SERVICE: HCPCS

## 2024-03-08 PROCEDURE — 99232 SBSQ HOSP IP/OBS MODERATE 35: CPT | Performed by: PSYCHIATRY & NEUROLOGY

## 2024-03-08 PROCEDURE — 70551 MRI BRAIN STEM W/O DYE: CPT

## 2024-03-08 PROCEDURE — 700102 HCHG RX REV CODE 250 W/ 637 OVERRIDE(OP): Performed by: STUDENT IN AN ORGANIZED HEALTH CARE EDUCATION/TRAINING PROGRAM

## 2024-03-08 PROCEDURE — 97162 PT EVAL MOD COMPLEX 30 MIN: CPT

## 2024-03-08 PROCEDURE — 770020 HCHG ROOM/CARE - TELE (206)

## 2024-03-08 PROCEDURE — 99232 SBSQ HOSP IP/OBS MODERATE 35: CPT | Performed by: STUDENT IN AN ORGANIZED HEALTH CARE EDUCATION/TRAINING PROGRAM

## 2024-03-08 PROCEDURE — 36415 COLL VENOUS BLD VENIPUNCTURE: CPT

## 2024-03-08 RX ORDER — LABETALOL 200 MG/1
400 TABLET, FILM COATED ORAL 3 TIMES DAILY
Status: DISCONTINUED | OUTPATIENT
Start: 2024-03-08 | End: 2024-03-08

## 2024-03-08 RX ORDER — LABETALOL 200 MG/1
200 TABLET, FILM COATED ORAL 3 TIMES DAILY
Status: DISCONTINUED | OUTPATIENT
Start: 2024-03-08 | End: 2024-03-14 | Stop reason: HOSPADM

## 2024-03-08 RX ORDER — AMLODIPINE BESYLATE 5 MG/1
5 TABLET ORAL
Status: DISCONTINUED | OUTPATIENT
Start: 2024-03-08 | End: 2024-03-14 | Stop reason: HOSPADM

## 2024-03-08 RX ORDER — ATORVASTATIN CALCIUM 20 MG/1
20 TABLET, FILM COATED ORAL EVERY EVENING
Status: DISCONTINUED | OUTPATIENT
Start: 2024-03-08 | End: 2024-03-14 | Stop reason: HOSPADM

## 2024-03-08 RX ADMIN — TAMSULOSIN HYDROCHLORIDE 0.4 MG: 0.4 CAPSULE ORAL at 05:52

## 2024-03-08 RX ADMIN — FUROSEMIDE 80 MG: 10 INJECTION, SOLUTION INTRAVENOUS at 05:52

## 2024-03-08 RX ADMIN — LABETALOL HYDROCHLORIDE 200 MG: 200 TABLET, FILM COATED ORAL at 20:08

## 2024-03-08 RX ADMIN — AMLODIPINE BESYLATE 5 MG: 5 TABLET ORAL at 11:33

## 2024-03-08 RX ADMIN — FUROSEMIDE 80 MG: 10 INJECTION, SOLUTION INTRAVENOUS at 17:24

## 2024-03-08 RX ADMIN — LABETALOL HYDROCHLORIDE 400 MG: 200 TABLET, FILM COATED ORAL at 15:22

## 2024-03-08 RX ADMIN — DOCUSATE SODIUM 50 MG AND SENNOSIDES 8.6 MG 2 TABLET: 8.6; 5 TABLET, FILM COATED ORAL at 17:24

## 2024-03-08 RX ADMIN — LIDOCAINE 1 PATCH: 4 PATCH TOPICAL at 05:51

## 2024-03-08 RX ADMIN — ATORVASTATIN CALCIUM 20 MG: 20 TABLET, FILM COATED ORAL at 17:24

## 2024-03-08 ASSESSMENT — ENCOUNTER SYMPTOMS
HEADACHES: 0
FOCAL WEAKNESS: 0
SHORTNESS OF BREATH: 0
SPEECH CHANGE: 0
NERVOUS/ANXIOUS: 0
WEAKNESS: 0
MYALGIAS: 0
DIZZINESS: 0
NECK PAIN: 0
DEPRESSION: 1
COUGH: 0
BACK PAIN: 0
BLURRED VISION: 0
FLANK PAIN: 0
ABDOMINAL PAIN: 0
NAUSEA: 0
VOMITING: 0
HEARTBURN: 0
CHILLS: 0
SENSORY CHANGE: 1
FEVER: 0

## 2024-03-08 ASSESSMENT — PATIENT HEALTH QUESTIONNAIRE - PHQ9
1. LITTLE INTEREST OR PLEASURE IN DOING THINGS: NOT AT ALL
2. FEELING DOWN, DEPRESSED, IRRITABLE, OR HOPELESS: NOT AT ALL
SUM OF ALL RESPONSES TO PHQ9 QUESTIONS 1 AND 2: 0

## 2024-03-08 ASSESSMENT — COGNITIVE AND FUNCTIONAL STATUS - GENERAL
DAILY ACTIVITIY SCORE: 18
PERSONAL GROOMING: A LITTLE
MOVING FROM LYING ON BACK TO SITTING ON SIDE OF FLAT BED: A LITTLE
DRESSING REGULAR LOWER BODY CLOTHING: A LITTLE
WALKING IN HOSPITAL ROOM: A LITTLE
HELP NEEDED FOR BATHING: A LITTLE
DRESSING REGULAR UPPER BODY CLOTHING: A LITTLE
CLIMB 3 TO 5 STEPS WITH RAILING: A LITTLE
MOBILITY SCORE: 19
EATING MEALS: A LITTLE
STANDING UP FROM CHAIR USING ARMS: A LITTLE
SUGGESTED CMS G CODE MODIFIER MOBILITY: CK
SUGGESTED CMS G CODE MODIFIER DAILY ACTIVITY: CK
TOILETING: A LITTLE
MOVING TO AND FROM BED TO CHAIR: A LITTLE

## 2024-03-08 ASSESSMENT — GAIT ASSESSMENTS
DISTANCE (FEET): 200
DEVIATION: BRADYKINETIC;OTHER (COMMENT)
GAIT LEVEL OF ASSIST: STANDBY ASSIST

## 2024-03-08 ASSESSMENT — PAIN DESCRIPTION - PAIN TYPE: TYPE: ACUTE PAIN

## 2024-03-08 ASSESSMENT — ACTIVITIES OF DAILY LIVING (ADL): TOILETING: INDEPENDENT

## 2024-03-08 NOTE — ASSESSMENT & PLAN NOTE
Developed abrupt symmetric Right hemianopsia 3/7/24 with dysmetria  CT demonstrates acute-on-chronic Right occipital CVA without LVO  Neurology consulted  LDL <50, returned to atorvastatin 20 mg  Telemetry  PT/OT/SLP recommend   MRI brain demonstrates bilateral occipital infarcts with Right hemorrhage  Holding ASA/Plavix due to Right occipital hemorrhage  TTE normal  A1c 5.8 consistent with prediabetes  BG goal <180  SBP goal <130, increased lasix and titrated labetalol    Per neurology recommendation will require repeat CT on Wednesday or Thursday to evaluate restarting anticoagulation    3/13/2024  Head CT per my review showing hypodensity in the left occipital lobe with no acute hemorrhages. No hydrocephalus.    Starting apixaban as per Dr. Chaudhary's recommendations from 3/8/2024

## 2024-03-08 NOTE — PROGRESS NOTES
[facility] Medicine Daily Progress Note    Date of Service  3/7/2024    Chief Complaint  Kaleb Yanes is a 73 y.o. male admitted 3/1/2024 with flank pain    Hospital Course  Kaleb Yanes is a 73 y.o. male who presented 3/1/2024 with acute renal failure due to ureteral obstruction.  The patient lives in [facility] had been having increased left-sided flank pain and back pain for which she has been taking ibuprofen this morning he woke up and had 3 episodes of hematuria and went to the emergency department where he was found to have acute renal failure and hyperkalemia 2 weeks ago his creatinine was 1.37 and on day of admission it was 7, potassium was 5.4.     Patient stated he initially attributed his flank pain and back pain to his bony metastases from his prostate cancer.  He would not have gone into the emergency room except for the hematuria, after the initial 3 episodes however his hematuria has now resolved, he denies any fever, chills, dysuria he thinks he has been making a normal amount of urine.  His appetite has been poor since beginning of the year but his family has been through a lot of stress and he thinks that his appetite is starting to improve he has not had any nausea or vomiting he does not describe anything that sounds like renal colic.    Interval Problem Update    Overnight he became dyspneic and anxious.  Tachycardic. EKG demonstrated sinus tahcycardia.  CXR demonstrated increased interstitial edema.  He became hypoxic.  Bicarb gtt discontinued.  BMP reviewed, mild Na 134, Cr unchanged 6.8.  P 5.4.  POC discussed with Nephrologist Dr. Kelly, advised IV lasix to diurese pulmonary edema due to ATN.  His wife noted that he seems off in speech and mentation.  He reports no pain in the Right flank nephrostomy.  This morning he developed abrupt Right symmetric hemianopsia. He had dysmetria.  STAT CTA H&N demonstrated an acute-on-chronic occipital infarct without  LVO.  Neurology consulted, POC discussed with Neurologist Dr. Chaudhary, who advised BP reduction, A1c, high-intensity statin, and TTE.  Transferred to Telemetry for Afib monitoring.  POC discussed with his wife and son at bedside.  Increased IV lasix to 80 mg for uncontrolled hypertension with ongoing hypoxia and pulmonary edema.  Initiated on labetalol TID for uncontrolled hypertension.    I have discussed this patient's plan of care and discharge plan at IDT rounds today with Case Management, Nursing, Nursing leadership, and other members of the IDT team.    Consultants/Specialty  Urology  Nephrology  IR  Neurology    Code Status  DNAR/DNI    Disposition  The patient is not medically cleared for discharge to home or a post-acute facility.  Anticipate discharge to: skilled nursing facility    I have placed the appropriate orders for post-discharge needs.    Review of Systems  Review of Systems   Genitourinary:  Negative for flank pain.   Musculoskeletal:  Negative for back pain, joint pain, myalgias and neck pain.   Neurological:  Positive for sensory change.   Psychiatric/Behavioral:  The patient is nervous/anxious.         Physical Exam  Temp:  [36.3 °C (97.3 °F)-36.7 °C (98 °F)] 36.3 °C (97.4 °F)  Pulse:  [] 103  Resp:  [14-19] 17  BP: (133-173)/() 160/87  SpO2:  [92 %-98 %] 94 %    Physical Exam  Vitals and nursing note reviewed. Exam conducted with a chaperone present (Wife at bedside).   Constitutional:       General: He is not in acute distress.     Appearance: Normal appearance. He is not ill-appearing, toxic-appearing or diaphoretic.   HENT:      Head: Normocephalic.      Nose: Nose normal.      Mouth/Throat:      Mouth: Mucous membranes are moist.   Eyes:      General: Visual field deficit (Symmetric RIght hemianopsia) present. No scleral icterus.     Conjunctiva/sclera: Conjunctivae normal.   Cardiovascular:      Rate and Rhythm: Normal rate and regular rhythm.      Pulses: Normal pulses.       Heart sounds: Normal heart sounds. No murmur heard.     No friction rub. No gallop.   Pulmonary:      Effort: Pulmonary effort is normal. No respiratory distress.      Breath sounds: Normal breath sounds. No wheezing, rhonchi or rales.   Abdominal:      General: Abdomen is flat. Bowel sounds are normal. There is no distension.      Palpations: Abdomen is soft.      Tenderness: There is no abdominal tenderness. There is no guarding or rebound.      Comments: R nephrostomy tube cdi with mild associated hematuria   Genitourinary:     Comments: R nephrostomy with rian clear urine.  Musculoskeletal:      Cervical back: Neck supple.      Right lower leg: Edema present.      Left lower leg: Edema present.      Comments: Trace pedal edema   Skin:     General: Skin is warm and dry.      Comments: Right PNT site c/d/I, minimally tender   Neurological:      Mental Status: He is confused.      Cranial Nerves: No dysarthria or facial asymmetry.      Motor: No weakness or pronator drift.      Coordination: Finger-Nose-Finger Test abnormal.      Gait: Gait normal.      Comments: Inconsistently follows commands, occasional word substitution.   Psychiatric:         Attention and Perception: He is inattentive.         Mood and Affect: Mood and affect normal.         Speech: Speech is delayed.         Behavior: Behavior is slowed. Behavior is cooperative.         Thought Content: Thought content normal.         Cognition and Memory: Cognition normal.         Judgment: Judgment normal.         Fluids    Intake/Output Summary (Last 24 hours) at 3/7/2024 1720  Last data filed at 3/7/2024 1000  Gross per 24 hour   Intake 160 ml   Output 1100 ml   Net -940 ml       Laboratory  Recent Labs     03/05/24  0553 03/06/24  0149 03/07/24  0047   WBC 11.7* 11.0* 10.4   RBC 4.06* 4.28* 4.06*   HEMOGLOBIN 12.3* 12.6* 12.0*   HEMATOCRIT 34.3* 36.7* 34.7*   MCV 84.5 85.7 85.5   MCH 30.3 29.4 29.6   MCHC 35.9 34.3 34.6   RDW 39.7 40.1 40.0    PLATELETCT 293 325 294   MPV 9.6 9.9 9.8     Recent Labs     03/05/24  0553 03/06/24  0149 03/07/24  0047   SODIUM 135 135 134*   POTASSIUM 4.5 4.8 4.3   CHLORIDE 98 92* 92*   CO2 22 25 25   GLUCOSE 145* 98 100*   BUN 58* 58* 63*   CREATININE 7.14* 6.79* 6.80*   CALCIUM 7.8* 8.5 8.0*                     Imaging  CT-CTA NECK WITH & W/O-POST PROCESSING   Final Result      1.  Prominent proximal internal carotid artery atherosclerosis with less than 50% stenosis.   2.  Patent vertebral arteries.   3.  Layering probably moderate bilateral pleural effusions. Some probable bilateral pulmonary edema.      CT-CTA HEAD WITH & W/O-POST PROCESS   Final Result      1.  Acute/subacute appearing and chronic right occipital infarcts.   2.  No large vessel occlusion or aneurysm.      DX-CHEST-PORTABLE (1 VIEW)   Final Result      1.  Increased perihilar interstitial markings. Findings may represent interstitial edema or atypical infection.      2.  Mild bibasilar opacities likely represent atelectasis.      IR-PERQ NEPH CATH NEW ACCESS (ALL RADIOLOGY) RIGHT   Final Result         1. Ultrasound And Fluoroscopic Guided Placement Of a right sided 8 German  Pigtail Locking Loop Percutaneous Nephrostomy Catheter.      2. Obstruction of the distal right ureter is demonstrated with no flow of contrast into the urinary bladder.      2. Nephrostogram Showing Satisfactory Catheter Position Without Extravasation.      NM-RENAL WITH DIURETIC WASHOUT   Final Result      1. Normal renal flow and function scan of the left kidney.   2. Decreased uptake and excretion of the radiotracer by the right kidney. Findings suggest chronic right hydroureteronephrosis at the distal ureteral level with resultant right renal cortical atrophy.      OUTSIDE IMAGES-CT ABDOMEN /PELVIS   Final Result      MR-BRAIN-W/O    (Results Pending)   EC-ECHOCARDIOGRAM COMPLETE W/O CONT    (Results Pending)        Assessment/Plan  * Occipital stroke (HCC)- (present on  admission)  Assessment & Plan  Developed abrupt symmetric Right hemianopsia 3/7/24 with dysmetria  CT demonstrates acute-on-chronic Right occipital CVA without LVO  Neurology consulted  Increased to high-intensity statin  Telemetry  PT/OT/SLP  Holding ASA/Plavix until MRI due to risk of hemorrhagic conversion  MRI brain ordered  TTE ordered  AM Lipid panel ordered  A1c 5.8 consistent with prediabetes  BG goal <180  SBP goal <130, increased lasix and initiated labetalol TID    Acute respiratory failure with hypoxia (HCC)  Assessment & Plan  Due to noncardiogenic pulmonary edema from NEREIDA  CXR increased interstitial edema  EKG not indicative of acute ischemic changes  Diuresis as tolerated    Constipation- (present on admission)  Assessment & Plan  Resolved  Continue bowel regimen    High anion gap metabolic acidosis- (present on admission)  Assessment & Plan  Due to NEREIDA  Resolved with iv bicarb  following    NEREIDA (acute kidney injury) (HCC)- (present on admission)  Assessment & Plan  Due to obstruction from obstructing stone and suspected intrinsic renal disease contributing  Plateau after recent NEREIDA suggestive of ATN  Nephrology consulted, no indication for RRT  Bicarb gtt discontinued due to noncardiogenic pulmonary edema  Avoid nephrotoxins  Repeat AM BMP    Hydronephrosis with urinary obstruction due to renal calculus- (present on admission)  Assessment & Plan  Vs stricture  See above  Johansen and nephrostomy R tube in place  Urology following  IR following        Gross hematuria- (present on admission)  Assessment & Plan  Resolved  Due to percutaneous nephrostomy  Holding ASA for nephrostomy tube and due to hematuria    Hyperkalemia- (present on admission)  Assessment & Plan  Due to NEREIDA  Resolved  Bmp in am    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 In review of yesterday's note (3/6/2024), there are no changes except as documented above.

## 2024-03-08 NOTE — PROGRESS NOTES
4 Eyes Skin Assessment Completed by SANTA De Souza and SANTA Robbins.    Head WDL  Ears WDL  Nose WDL  Mouth WDL  Neck WDL  Breast/Chest Redness, Blanching, and Rash  Shoulder Blades WDL  Spine Redness and Blanching  (R) Arm/Elbow/Hand WDL  (L) Arm/Elbow/Hand WDL  Abdomen Redness, Blanching, and Rash  Groin WDL  Scrotum/Coccyx/Buttocks WDL  (R) Leg WDL  (L) Leg WDL  (R) Heel/Foot/Toe WDL  (L) Heel/Foot/Toe WDL          Devices In Places Tele Box, Blood Pressure Cuff, and Pulse Ox      Interventions In Place Pillows    Possible Skin Injury No    Pictures Uploaded Into Epic N/A  Wound Consult Placed N/A  RN Wound Prevention Protocol Ordered No

## 2024-03-08 NOTE — PROGRESS NOTES
[facility] -  PROGRESS NOTE               Author: Kemi Kelly D.O. Date & Time: 3/8/2024  10:30 AM     HPI:  74 yo male from [facility] with PMH significant for metastatic prostate cancer -  on hormone blocking medication, due for next injection in March. , CAD s/p coronary artery stent placemen (8/2007), HTN, and HLD who presented to OSH with hematuria associated with worsening left sided flank and back pain x 2 days. At OSH he was diagnosed with acute renal failure with presenting Crt of 7 and was transported to [facility] for higher level of care on 3/1/24. Previous Crt level noted to be 0.92 in July 2022 and 2 weeks prior to admission his creatinine was 1.37 . On arrival he had a Lasix renal scan with diuretic which revealed normal renal flow and function of the left kidney with decreased uptake and excretion by the right kidney; and findings suggestive of chronic right hydroureteronephrosis at the distal ureteral level with resultant right renal cortical atrophy. Urology was consulted and recommended a Arreola catheter placement. His Crt improved to 4.92 and further to 4.0 before worsening again to 5.13 today. A Rt Nephrostomy tube was placed by MI today to see if this will help improve renal functions. His flank pain has since resolved. Hematuria has cleared up as well. No F/C/N/V/CP/SOB.  No melena, hematochezia, hematemesis.  No HA, visual changes, or abdominal pain. Nephrology consulted for management of NEREIDA and possible need for dialysis      DAILY NEPHROLOGY SUMMARY:  03/04 - consult done  03/05 - SCr trended up, arreola dislodged and not replaced due to PVR 0, SBP 140s-160s, UOP 1075mL, no freely voided urine recorded, UA with moderate occult blood, 0-2 hyaline casts, no protein, no new c/o, reports that he is finally voiding and urine via bladder is clearing  03/06 - SCr trending down, UOP 970mL recorded, SBP 140s-170s, no new c/o, has been walking laps, wife encouraging him to  "increase PO intake  03/07 - SCr stable, UOP 1600mL, SBP 130s-160s, has c/o SOB overnight, mild tachypnea noted, CXR with some increased interstitial markings, IVF stopped this AM, reports now SOB improved and good UOP via NPT, +constipation ongoing, no other new c/o  03/08 - SBP 130s-170s, developed abrupt right symmetric hemianopsia yesterday, stat CTA with acute/subacute and chronic R occipital infarcts, MRI brain w/o with bilateral occipital infarcts and hemorrhagic transformation on the R, both images demonstrate osseous lesions, patient reports he believes his visual deficits may be slightly improved from yesterday, UOP 2L, SCr trended up to 8.95, +fatigue, no other new c/o    REVIEW OF SYSTEMS:    10 point ROS reviewed and is as per HPI/daily summary or otherwise negative    PMH/PSH/SH/FH: Reviewed and unchanged since admission note  CURRENT MEDICATIONS: Reviewed from admission to present day    VS:  BP (!) 151/98   Pulse 90   Temp 36.4 °C (97.5 °F) (Temporal)   Resp 18   Ht 1.702 m (5' 7\")   Wt 60.4 kg (133 lb 2.5 oz)   SpO2 97%   BMI 20.86 kg/m²   Physical Exam  Vitals and nursing note reviewed.   Constitutional:       General: He is not in acute distress.  HENT:      Head: Normocephalic and atraumatic.      Right Ear: External ear normal.      Left Ear: External ear normal.      Nose: Nose normal.      Mouth/Throat:      Mouth: Mucous membranes are moist.      Pharynx: Oropharynx is clear.   Eyes:      General: No scleral icterus.     Extraocular Movements: Extraocular movements intact.   Cardiovascular:      Rate and Rhythm: Normal rate and regular rhythm.   Pulmonary:      Effort: Pulmonary effort is normal.      Breath sounds: Rales (L base) present. No wheezing.   Abdominal:      General: Abdomen is flat. There is no distension.   Musculoskeletal:      Right lower leg: No edema.      Left lower leg: No edema.   Skin:     General: Skin is warm and dry.      Findings: No bruising.   Neurological: "      Mental Status: He is alert and oriented to person, place, and time.      Motor: No weakness.   Psychiatric:         Mood and Affect: Mood normal.         Behavior: Behavior normal.         Thought Content: Thought content normal.         Judgment: Judgment normal.         Fluids:  In: 600 [P.O.:600]  Out: 2000     LABS:  Recent Labs     03/06/24  0149 03/07/24  0047 03/08/24  0420   SODIUM 135 134* 136   POTASSIUM 4.8 4.3 4.8   CHLORIDE 92* 92* 91*   CO2 25 25 24   GLUCOSE 98 100* 77   BUN 58* 63* 72*   CREATININE 6.79* 6.80* 8.95*   CALCIUM 8.5 8.0* 8.1*     Imaging: reviewed    IMPRESSION:  # NEREIDA, worsening              - baseline Crt around 1.37 per labs 2 weeks prior to admission  - Presenting Crt 7 at OSH, improved to 4.0 after arreola placement but has worsened again  - Likely cause of initial NEREIDA: Post renal obstruction, VS ATN (of note, patient was taking NSAIDs for flank pain), now likely with additional contrast injury  # CKD 3, NOS  #Atrophic /Echogenic Rt kidney               - Lasix renal scan 3/1/24: Normal renal flow and function scan of the left kidney.              chronic right hydroureteronephrosis at the distal ureteral level with resultant right renal cortical atrophy; s/p R nephrostomy  # Mild Hyperkalemia   # Severe Metabolic Acidosis, improved  # Metastatic Prostate Cancer to bones               - started on Firmagon a month ago   # Gross Hematuria   # Constipation  # Occipital CVA  # HTN  - goal <140/90  - not at goal        PLAN:  - No compelling indication for RRT, though patient/wife aware renal function may worsen, patient agreeable to dialysis if/when needed  -  continue lasix, BBL  - added amlodipine, ok to increase dose if needed  - Monitor urine output and Rt Nephrostomy output closely   - Daily labs   - Avoid Nephrotoxins, IV contrast  - Daily evaluation for RRT needs  - Dose all meds per eGFR < 15  - strict I/O  - he understands he is on a renal diet  - IS use  encouraged    Other management per primary service

## 2024-03-08 NOTE — PROGRESS NOTES
Neurology Progress Note  Neurohospitalist Service, [facility]iences    Referring Physician: David Evans M.D.    Chief complaints:  Confusion and visual field defect more prominent in right visual field.      HPI: Refer to initial documented Neurology H&P, as detailed in the patient's chart.    Interval History:  No new complaints or acute events.  Feels vision has slightly improved although continues to have right homonymous hemianopsia.  Despite right occipital infarct, not much visual field defect on the left noted.      Review of systems: In addition to what is detailed in the HPI and/or updated in the interval history, all other systems reviewed and are negative.    Past Medical History:    has a past medical history of CAD (coronary artery disease), Hyperlipidemia, and Hypertension.    He has no past medical history of Infectious disease, Liver disease, or Psychiatric disorder.    FHx:  family history is not on file.    SHx:   reports that he quit smoking about 16 years ago. His smoking use included cigarettes. He started smoking about 46 years ago. He has never used smokeless tobacco. He reports current alcohol use. He reports current drug use. Drugs: Marijuana and Oral.    Medications:    Current Facility-Administered Medications:     labetalol (Normodyne) tablet 400 mg, 400 mg, Oral, TID, David Evans M.D.    atorvastatin (Lipitor) tablet 20 mg, 20 mg, Oral, Q EVENING, David Evans M.D.    amLODIPine (Norvasc) tablet 5 mg, 5 mg, Oral, Q DAY, Kemi Kelly, D.OIvonne, 5 mg at 03/08/24 1133    acetaminophen (Tylenol) tablet 1,000 mg, 1,000 mg, Oral, TID PRN, David Evans M.D.    furosemide (Lasix) injection 80 mg, 80 mg, Intravenous, BID DIURETIC, David Evans M.D., 80 mg at 03/08/24 0552    bisacodyl (Dulcolax) suppository 10 mg, 10 mg, Rectal, QDAY PRN, Valerie Walls M.D., 10 mg at 03/05/24 1312    normal saline flush 0.9 % SOLN 5-10 mL, 5-10 mL, Intravenous, Q12H PRN (RT), Valerie ROSE  CHRISTINA SwiftRIvonneN., 10 mL at 03/05/24 2022    lidocaine jelly (Uro-Jet) 2 %, , Topical, Once PRN, KOLE Roblero-ARISTIDES    senna-docusate (Pericolace Or Senokot S) 8.6-50 MG per tablet 2 Tablet, 2 Tablet, Oral, Q EVENING, 2 Tablet at 03/07/24 1603 **AND** polyethylene glycol/lytes (Miralax) Packet 1 Packet, 1 Packet, Oral, QDAY PRN, BEBE GallowayA.-ARISTIDES, 1 Packet at 03/03/24 0244    lidocaine (Asperflex) 4 % patch 1 Patch, 1 Patch, Transdermal, DAILY, Valerie Walls M.D., 1 Patch at 03/08/24 0551    morphine 4 MG/ML injection 2 mg, 2 mg, Intravenous, Q4HRS PRN, Valerie Walls M.D.    oxyCODONE immediate-release (Roxicodone) tablet 5 mg, 5 mg, Oral, Q4HRS PRN, Valerie Walls M.D., 5 mg at 03/07/24 1604    tamsulosin (Flomax) capsule 0.4 mg, 0.4 mg, Oral, DAILY, Aurea Grace M.D., 0.4 mg at 03/08/24 0552    ondansetron (Zofran) syringe/vial injection 4 mg, 4 mg, Intravenous, Q4HRS PRN, Aurea Grace M.D., 4 mg at 03/07/24 1857    ondansetron (Zofran ODT) dispertab 4 mg, 4 mg, Oral, Q4HRS PRN, Aurea Grace M.D.    Icy Hot Balm Extra Strength 7.6-29 % topical ointment, , Topical, PRN, BEBE GallowayA.-CIvonne    Physical Examination:    Vitals:    03/07/24 1927 03/08/24 0009 03/08/24 0518 03/08/24 0811   BP: (!) 161/106 138/84 (!) 153/99 (!) 151/98   Pulse: (!) 104 87 (!) 101 90   Resp: 18 18 17 18   Temp: 36.3 °C (97.3 °F) 36.4 °C (97.6 °F) 36.5 °C (97.7 °F) 36.4 °C (97.5 °F)   TempSrc: Temporal Temporal Temporal Temporal   SpO2: 93% 95% 90% 97%   Weight:       Height:           General:   Patient is awake and in no acute distress  Neck: Full range of motion  Eyes: Midline, Pupils reactive to light.  CV: RRR  Lungs: No respiratory distress  Extremities: No cyanosis, warm, no significant edema.     NEUROLOGICAL EXAM:   Mental status: Awake, alert and fully oriented, follows commands  Speech and language: speech is fluent and not dysarthric. The patient is able to name and repeat.  Cranial  nerve exam: Pupils are equal, round and reactive to light bilaterally. Visual fields: He has right visual field cut (right homonymous hemianopsia) extraocular muscles are intact. Sensation in the face is intact to light touch. Face is symmetric. Hearing to finger rub equal. Palate elevates symmetrically. Shoulder shrug is full. Tongue is midline.  Motor exam: Sustain antigravity in all 4 extremities with no downward drift, although subjectively feels his left leg is heavier. Tone is normal. No abnormal movements were seen on exam.  Sensory exam: No sensory deficits identified   Coordination: no gross ataxia noted on exam  Plantar reflexes: Equivocal  Gait: deferred    Objective Data:    Labs:  Lab Results   Component Value Date/Time    PROTHROMBTM 15.2 (H) 03/03/2024 10:51 AM    INR 1.19 (H) 03/03/2024 10:51 AM      Lab Results   Component Value Date/Time    WBC 10.4 03/07/2024 12:47 AM    RBC 4.06 (L) 03/07/2024 12:47 AM    HEMOGLOBIN 12.0 (L) 03/07/2024 12:47 AM    HEMATOCRIT 34.7 (L) 03/07/2024 12:47 AM    MCV 85.5 03/07/2024 12:47 AM    MCH 29.6 03/07/2024 12:47 AM    MCHC 34.6 03/07/2024 12:47 AM    MPV 9.8 03/07/2024 12:47 AM    NEUTSPOLYS 70.20 03/06/2024 01:49 AM    LYMPHOCYTES 17.10 (L) 03/06/2024 01:49 AM    MONOCYTES 10.80 03/06/2024 01:49 AM    EOSINOPHILS 1.00 03/06/2024 01:49 AM    BASOPHILS 0.40 03/06/2024 01:49 AM      Lab Results   Component Value Date/Time    SODIUM 136 03/08/2024 04:20 AM    POTASSIUM 4.8 03/08/2024 04:20 AM    CHLORIDE 91 (L) 03/08/2024 04:20 AM    CO2 24 03/08/2024 04:20 AM    GLUCOSE 77 03/08/2024 04:20 AM    BUN 72 (H) 03/08/2024 04:20 AM    CREATININE 8.95 (HH) 03/08/2024 04:20 AM    CREATININE 1.0 08/18/2007 05:50 AM      Lab Results   Component Value Date/Time    CHOLSTRLTOT 101 03/08/2024 04:20 AM    LDL 48 03/08/2024 04:20 AM    HDL 33 (A) 03/08/2024 04:20 AM    TRIGLYCERIDE 99 03/08/2024 04:20 AM       Lab Results   Component Value Date/Time    ALKPHOSPHAT 289 (H)  03/04/2024 05:45 AM    ASTSGOT 33 03/04/2024 05:45 AM    ALTSGPT 14 03/04/2024 05:45 AM    TBILIRUBIN 0.4 03/04/2024 05:45 AM        Imaging/Testing:    I interpreted and/or reviewed the patient's neuroimaging    EC-ECHOCARDIOGRAM COMPLETE W/O CONT   Final Result      MR-BRAIN-W/O   Final Result      1.  Bilateral occipital infarcts. Hemorrhagic transformation is noted in the right occipital infarct.   2.  Abnormal T1 hypointensity of right side of C1 vertebral body and right occipital condyle. The review of CT scan of the neck demonstrates multifocal abnormal sclerotic areas. There is also destructive lesion in the right lateral condyle. These    findings are concerning for aggressive osseous process suggests metastasis. Further investigation is recommended.      CT-CTA NECK WITH & W/O-POST PROCESSING   Final Result   Addendum (preliminary) 1 of 1   Addendum:      There are scattered sclerotic osseous lesions seen within the thoracic    spine, manubrium and partially visualized ribs consistent with sclerotic    osseous metastatic disease.      Final      1.  Prominent proximal internal carotid artery atherosclerosis with less than 50% stenosis.   2.  Patent vertebral arteries.   3.  Layering probably moderate bilateral pleural effusions. Some probable bilateral pulmonary edema.      CT-CTA HEAD WITH & W/O-POST PROCESS   Final Result      1.  Acute/subacute appearing and chronic right occipital infarcts.   2.  No large vessel occlusion or aneurysm.      DX-CHEST-PORTABLE (1 VIEW)   Final Result      1.  Increased perihilar interstitial markings. Findings may represent interstitial edema or atypical infection.      2.  Mild bibasilar opacities likely represent atelectasis.      IR-PERQ NEPH CATH NEW ACCESS (ALL RADIOLOGY) RIGHT   Final Result         1. Ultrasound And Fluoroscopic Guided Placement Of a right sided 8 [place]  Pigtail Locking Loop Percutaneous Nephrostomy Catheter.      2. Obstruction of the distal  right ureter is demonstrated with no flow of contrast into the urinary bladder.      2. Nephrostogram Showing Satisfactory Catheter Position Without Extravasation.      NM-RENAL WITH DIURETIC WASHOUT   Final Result      1. Normal renal flow and function scan of the left kidney.   2. Decreased uptake and excretion of the radiotracer by the right kidney. Findings suggest chronic right hydroureteronephrosis at the distal ureteral level with resultant right renal cortical atrophy.      OUTSIDE IMAGES-CT ABDOMEN /PELVIS   Final Result          Assessment and Plan:  Kaleb Yanes is a 73 y.o. right-handed male with past medical history significant for coronary artery disease, hypertension, hyperlipidemia who was admitted on 3/1/2024 for evaluation of flank pain and was found to have acute renal failure due to ureteral obstruction.  He is getting treatment under care of nephrology, urology and internal medicine.  While in the hospital he was noted to have some visual impairment.  A brain CT obtained which revealed hypodensities in bilateral occipital head region concerning for infarct.  I reviewed his CT and there appeared to be some mild petechial hemorrhage as well.  CT angiogram of the head and neck revealed no evidence of large vessel occlusion however there are some atherosclerotic disease.  Brain MRI confirmed bilateral occipital infarct with some hemorrhagic transformation within the right occipital infarct.  Echocardiogram revealed ejection fraction of 59% with normal left atrial size.  LDL is 48, hemoglobin A1c is 5.8.  Etiology of his stroke is not totally clear, however given underlying metastatic prostate cancer, hypercoagulable state associated with malignancy is a consideration.     Plan:  -q4h and PRN neuro assessment. VS per nursing/unit protocol.   -BP goal is normotension, 100-130/60-80. Antihypertensives per primary team.   -Telemetry; currently SR. Screen for Afib/arrhythmia.   -Given  hemorrhagic transformation as well as recent hematuria, will hold on antithrombotic treatment for now, however suggest to repeat brain CT in 4-5 days and if hemorrhage is a stable and no contraindication due to hematuria, start empiric anticoagulation with Eliquis.  -Atorvastatin 40 mg PO q HS.  LDL is 48, goal LDL less than 70  -Recommend aggressive BG management per primary team. Avoid IVF with Dextrose. -BG goal . Hemoglobin A1c is 5.8.  Goal < 7  -PT/OT/SLP eval and treat for early mobilization.  -All other medical management per primary team.   -DVT PPX: SCDs.  -Neurology will sign off, please call us if there is any question.       The evaluation of the patient, and recommended management, was discussed with David Evans M.D.    Please note that this dictation was created using voice recognition software. I have made every reasonable attempt to correct obvious errors, but I expect that there are errors of grammar and possibly content that I did not discover before finalizing the note.      Jessee Chaudhary MD  Acute Care Neurology Services

## 2024-03-08 NOTE — THERAPY
Speech Language Pathology   Clinical Swallow Evaluation     Patient Name: Kaleb Yanes  AGE:  73 y.o., SEX:  male  Medical Record #: [id number]  Date of Service: 3/8/2024      History of Present Illness  73 y.o. male admitted 3/1/2024 with flank pain. 03/08 pt developed abrupt right symmetric hemianopsia yesterday, stat CTA with acute/subacute and chronic R occipital infarcts, MRI brain w/o with bilateral occipital infarcts and hemorrhagic transformation on the R, both images demonstrate osseous lesions.    PMHx metastatic prostate cancer, CAD, HTN, HLD    No previous h/o SLP services at [facility]    CMHx occipital stroke, acute respiratory failure w/hypoxia, constipation, gross hematuria, prostate ca w/mets    CXR 3/6/2024  1.  Increased perihilar interstitial markings. Findings may represent interstitial edema or atypical infection.  2.  Mild bibasilar opacities likely represent atelectasis.    MRI-Brain 3/8/2024  1.  Bilateral occipital infarcts. Hemorrhagic transformation is noted in the right occipital infarct.  2.  Abnormal T1 hypointensity of right side of C1 vertebral body and right occipital condyle. The review of CT scan of the neck demonstrates multifocal abnormal sclerotic areas. There is also destructive lesion in the right lateral condyle. These   findings are concerning for aggressive osseous process suggests metastasis. Further investigation is recommended.        General Information:  Vitals  O2 (LPM): 1  O2 Delivery Device: Silicone Nasal Cannula, Room air w/o2 available  Level of Consciousness: Alert, Awake  Patient Behaviors: Tearful  Orientation: Oriented x 4  Follows Directives: Yes      Prior Living Situation & Level of Function:  Prior Services: Home-Independent  Housing / Facility: [address]  Lives with - Patient's Self Care Capacity: Spouse  Communication: WFL  Swallowing: WFL       Oral Mechanism Evaluation:  Dentition: Fair, Natural dentition   Facial Symmetry: Equal  Facial  Sensation: Equal     Labial Observations: WFL   Lingual Observations: Midline            Laryngeal Function:  Secretion Management: Adequate  Voice Quality: WFL  Cough: Perceptually WNL         Subjective  Patient cleared for evaluation by RN. Pt received awake, eager for PO, spouse at bedside.      Assessment  Current Method of Nutrition: NPO until cleared by speech pathology  Positioning: [facility]  Bolus Administration: Patient  O2 (LPM): 1 O2 Delivery Device: Silicone Nasal Cannula, Room air w/o2 available  Factor(s) Affecting Performance: Other (see comments) (R homonomous hemianopsia)  Tracheostomy : No          Swallowing Trials:  Swallowing Trials  Thin Liquid (TN0): WFL  Liquidised (LQ3): WFL  Regular (RG7): WFL      Comments: Patient needing min feeding A 2/2 visual neglect (R lateral field and L nasal field). Adequate oral bolus acceptance/containment. Adequate bite with functional mastication of solids. No cough/throat clear appreciated with PO trials. Vocal quality remained clear throughout PO intake. Single swallow completed per bolus. No signs of esophageal dysfunction. No overt s/sx of aspiration appreciated. Provided education regarding general aspiration precautions as well as signs of aspiration. RN updated.        Clinical Impressions  Patient presents with a functional swallow, diet modification is not indicated. Service will not actively follow for dysphagia management, re-consult with any change in status.      Recommendations  Diet Consistency: Regular solids, thin liquids  Instrumentation: None indicated at this time  Medication: As tolerated  Supervision: Assist with meal tray set up  Positioning: Fully upright and midline during oral intake  Risk Management : Slow rate of intake, Physical mobility, as tolerated  Oral Care: BID         SLP Treatment Plan  Treatment Plan: None Indicated  SLP Frequency: N/A - Evaluation Only  Estimated Duration: N/A - Evaluation Only        Shazia  Sarah, SLP

## 2024-03-08 NOTE — PROGRESS NOTES
"Radiology Progress Note   Author: JUAREZ Villafana Date & Time created: 3/8/2024 1230   Date of admission  3/1/2024  Note to reader: this note follows the APSO format rather than the historical SOAP format. Assessment and plan located at the top of the note for ease of use.    Chief Complaint  73 y.o. male admitted 3/1/2024 with right sided back pain    HPI   Kaleb Yanes is a 72 yo male with PMH significant for prostate cancer, CAD s/p coronary artery stent placemen (8/2007), HTN, and HLD who presented to OSH with hematuria associated increasing left sided flank and back pain and was diagnosed with acute renal failure. Pt was transported to [facility] for higher level of care. 3/1/24 Lasix renal scan with diuretic washout revealed normal renal flow and function of the left kidney with decreased uptake and excretion by the right kidney. \"Findings suggest chronic right hydroureteronephrosis at the distal ureteral level with resultant right renal cortical atrophy\" (Avinash Crystal MD) 3/3/24 IR was consulted and Dr. Waite from IR performed a right nephrostogram with 8 fr nephrostomy tube placement on 03/04/24.     Interval History:   03/04/24- Pt  not in room undergoing right nephrostogram with right 8 fr nephrostomy tube placement  For maximal decompression of kidney  MAG3 shows decreased renal function.    03/05/24-right #8 Slovak nephrostomy tube with 775 mL of pink urine, without clots, output in the last 24 hours.  Ordered placed for RNs to flush nephrostomy tube only for bloody urine or urine with clots.  Renal function continues to worsen despite nephrostomy tube placement  I reviewed the most recent labs: WBC 11.7; Hgb  12.3,  Cr 7.14  Coags INR 1.19,      03/06/24-right #8 Slovak nephrostomy tube with 400 mL of pink urine, without clots, output in the last 24 hours.  Ordered placed for RNs to flush nephrostomy tube ONLY for bloody urine or urine with clots.Discussed drain care/management  with pt " and family at bedside.   I reviewed the most recent labs: WBC 11.0; Hgb  12.6,  Cr 6.79 ; Coags INR 1.19,     03/08/24-unfortunately yesterday a.m. patient's wife noted visual changes and patient . Neurology was consulted and he underwent a CT with contrast followed by MRI brain which confirmed bilateral occipital infarcts with some hemorrhagic transformation in the right.  He was transferred to the neurology floor. Right #8 Iraqi nephrostomy tube with 2000 mL of straw-colored urine, output in the last 24 hours.  Ordered placed for RNs to flush nephrostomy tube ONLY for bloody urine or urine with clots.Discussed drain care/management  with pt and patient wife at bedside. I reviewed the most recent labs: WBC 10.4; Hgb 12.0,  Cr 8.95; -increase in creatinine most likely reflective from contrast given with CT.    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)    Hyperkalemia    Gross hematuria    Hydronephrosis with urinary obstruction due to renal calculus    NEREDIA (acute kidney injury) (HCC)    High anion gap metabolic acidosis    Constipation    Acute respiratory failure with hypoxia (HCC)      Plan IR  -Follow renal function/assess renal function for improvement post nephrostomy tube placement  -Possible urology intervention if creatinine improves with NT-urology following  -  Irrigate right Nephrostomy tube with 10 ml of sterile saline q shift ONLY IF no output of bloody output  -Avoid all nephrotoxic agents  - Ok to shower with catheter as long as site is covered with waterproof dressing; change dressing if it becomes wet.  - No baths or submerging site under water until catheter removed   - If no procedures will be done by Urology then okay to dc with Nephrostomy tube if okay with Urology and hospitalist- Urology  to manage NT as outpt  - For long term use, Nephrostomy tube will need to be exchanged every 3  "months; sooner prn- by Urology   - IR will continue to follow nephrostomy tube while pt is in the hospital; we may not round every day     -Thank you for allowing Interventional Radiology team to participate in the patients care, if any additional care or requests are needed in the future please do not hesitate call or place IR order. [phone]           Review of Systems  Physical Exam   Review of Systems   Constitutional:  Positive for malaise/fatigue. Negative for chills and fever.   HENT:  Negative for hearing loss.    Eyes:  Negative for blurred vision.   Respiratory:  Negative for cough and shortness of breath.    Cardiovascular:  Negative for chest pain.   Gastrointestinal:  Negative for abdominal pain, heartburn, nausea and vomiting.   Genitourinary:         \"No problems with my tube\"   Musculoskeletal:         Bone pain   Neurological:  Negative for dizziness, speech change, focal weakness and headaches.      Vitals:    03/08/24 0811   BP: (!) 151/98   Pulse: 90   Resp: 18   Temp: 36.4 °C (97.5 °F)   SpO2: 97%        Physical Exam  Vitals and nursing note reviewed.   Constitutional:       Appearance: Normal appearance.   HENT:      Head: Normocephalic and atraumatic.      Nose: Nose normal.      Mouth/Throat:      Mouth: Mucous membranes are moist.      Pharynx: Oropharynx is clear.   Eyes:      Pupils: Pupils are equal, round, and reactive to light.   Cardiovascular:      Rate and Rhythm: Normal rate.   Pulmonary:      Effort: Pulmonary effort is normal.   Abdominal:      General: Abdomen is flat.      Palpations: Abdomen is soft.      Comments: Soft, round, nondistended   Genitourinary:     Comments: Right nephrostomy tube with straw yellow urine draining  Right nephrostomy tube site CDI  Skin:     Capillary Refill: Capillary refill takes less than 2 seconds.   Neurological:      Mental Status: He is alert and oriented to person, place, and time. Mental status is at baseline.      Motor: No weakness.      " Comments: Awake alert and oriented, following all commands awake, alert and fully oriented, follows commands  Speech is fluent   Right visual field cut-right homonymous hemianopisa S tongue midline   Moving all extremities antigravity                 Labs    Recent Labs     03/06/24 0149 03/07/24 0047   WBC 11.0* 10.4   RBC 4.28* 4.06*   HEMOGLOBIN 12.6* 12.0*   HEMATOCRIT 36.7* 34.7*   MCV 85.7 85.5   MCH 29.4 29.6   MCHC 34.3 34.6   RDW 40.1 40.0   PLATELETCT 325 294   MPV 9.9 9.8     Recent Labs     03/06/24 0149 03/07/24 0047 03/08/24  0420   SODIUM 135 134* 136   POTASSIUM 4.8 4.3 4.8   CHLORIDE 92* 92* 91*   CO2 25 25 24   GLUCOSE 98 100* 77   BUN 58* 63* 72*   CREATININE 6.79* 6.80* 8.95*   CALCIUM 8.5 8.0* 8.1*     Recent Labs     03/06/24 0149 03/07/24 0047 03/08/24 0420   ALBUMIN 3.2 3.0*  --    CREATININE 6.79* 6.80* 8.95*     EC-ECHOCARDIOGRAM COMPLETE W/O CONT   Final Result      MR-BRAIN-W/O   Final Result      1.  Bilateral occipital infarcts. Hemorrhagic transformation is noted in the right occipital infarct.   2.  Abnormal T1 hypointensity of right side of C1 vertebral body and right occipital condyle. The review of CT scan of the neck demonstrates multifocal abnormal sclerotic areas. There is also destructive lesion in the right lateral condyle. These    findings are concerning for aggressive osseous process suggests metastasis. Further investigation is recommended.      CT-CTA NECK WITH & W/O-POST PROCESSING   Final Result   Addendum (preliminary) 1 of 1   Addendum:      There are scattered sclerotic osseous lesions seen within the thoracic    spine, manubrium and partially visualized ribs consistent with sclerotic    osseous metastatic disease.      Final      1.  Prominent proximal internal carotid artery atherosclerosis with less than 50% stenosis.   2.  Patent vertebral arteries.   3.  Layering probably moderate bilateral pleural effusions. Some probable bilateral pulmonary edema.     "  CT-CTA HEAD WITH & W/O-POST PROCESS   Final Result      1.  Acute/subacute appearing and chronic right occipital infarcts.   2.  No large vessel occlusion or aneurysm.      DX-CHEST-PORTABLE (1 VIEW)   Final Result      1.  Increased perihilar interstitial markings. Findings may represent interstitial edema or atypical infection.      2.  Mild bibasilar opacities likely represent atelectasis.      IR-PERQ NEPH CATH NEW ACCESS (ALL RADIOLOGY) RIGHT   Final Result         1. Ultrasound And Fluoroscopic Guided Placement Of a right sided 8 Danish  Pigtail Locking Loop Percutaneous Nephrostomy Catheter.      2. Obstruction of the distal right ureter is demonstrated with no flow of contrast into the urinary bladder.      2. Nephrostogram Showing Satisfactory Catheter Position Without Extravasation.      NM-RENAL WITH DIURETIC WASHOUT   Final Result      1. Normal renal flow and function scan of the left kidney.   2. Decreased uptake and excretion of the radiotracer by the right kidney. Findings suggest chronic right hydroureteronephrosis at the distal ureteral level with resultant right renal cortical atrophy.      OUTSIDE IMAGES-CT ABDOMEN /PELVIS   Final Result        INR   Date Value Ref Range Status   03/03/2024 1.19 (H) 0.87 - 1.13 Final     Comment:     INR - Non-therapeutic Reference Range: 0.87-1.13  INR - Therapeutic Reference Range: 2.0-4.0       No results found for: \"POCINR\"     Intake/Output Summary (Last 24 hours) at 3/4/2024 1535  Last data filed at 3/4/2024 0332  Gross per 24 hour   Intake 120 ml   Output 800 ml   Net -680 ml      Labs not explicitly included in this progress note were reviewed by the author. Radiology/imaging not explicitly included in this progress note was reviewed by the author.                    INR   Date Value Ref Range Status   03/02/2024 1.21 (H) 0.87 - 1.13 Final       Comment:       INR - Non-therapeutic Reference Range: 0.87-1.13  INR - Therapeutic Reference Range: 2.0-4.0   " "      No results found for: \"POCINR\"      Intake/Output Summary (Last 24 hours) at 3/3/2024 1626  Last data filed at 3/3/2024 1200      Gross per 24 hour   Intake 28 ml   Output 1997.5 ml   Net -1969.5 ml       Labs not explicitly included in this progress note were reviewed by the author. Radiology/imaging not explicitly included in this progress note was reviewed by the author.      I have performed a physical exam and reviewed and updated ROS and Plan today (3/5/2024).      38 minutes in directly providing and coordinating care and extensive data review.  No time overlap and excludes procedures.                   "

## 2024-03-08 NOTE — PROGRESS NOTES
Gross hematuria noted in Patient's nephrostomy output. Blood pressure still no at goal .  Hospitalist informed. Will continue to monitor.   (E4) spontaneous

## 2024-03-08 NOTE — CARE PLAN
The patient is Stable - Low risk of patient condition declining or worsening    Shift Goals  Clinical Goals: Monitor neuro status  Patient Goals: sleep  Family Goals: monitor    Progress made toward(s) clinical / shift goals:  Patient is A0x4 throughout shift, Nihss of 5. 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 Reoriented, safety protocols done. MRI head done early am. Hourly rounds to ensure safety and comfort.    Patient is not progressing towards the following goals:

## 2024-03-08 NOTE — CONSULTS
Neurology STROKE H&P  Neurohospitalist Service, [facility]    Referring Physician: David Evans M.D.    STROKE:   Possible stroke      HPI: Kaleb Yanes is a 73 y.o. right-handed male with past medical history significant for coronary artery disease, hypertension, hyperlipidemia who was admitted on 3/1/2024 for evaluation of flank pain and was found to have acute renal failure due to ureteral obstruction.  He is getting treatment under care of nephrology, urology and internal medicine.  Last night he had some confusional state and this morning his wife noted he has difficulty seeing objects in his right peripheral vision.  A brain CT obtained which revealed hypodensities in bilateral occipital head region concerning for infarct.  I reviewed his CT and there appeared to be some mild petechial hemorrhage as well.    Review of systems: In addition to what is detailed in the HPI above, all other systems reviewed and are negative.    Past Medical History:    has a past medical history of CAD (coronary artery disease), Hyperlipidemia, and Hypertension.    He has no past medical history of Infectious disease, Liver disease, or Psychiatric disorder.    FHx:  family history is not on file.    SHx:   reports that he quit smoking about 16 years ago. His smoking use included cigarettes. He started smoking about 46 years ago. He has never used smokeless tobacco. He reports current alcohol use. He reports current drug use. Drugs: Marijuana and Oral.    Allergies:  Allergies   Allergen Reactions    Nkda [No Known Drug Allergy]        Medications:    Current Facility-Administered Medications:     furosemide (Lasix) injection 40 mg, 40 mg, Intravenous, BID DIURETIC, Kemi Kelly D.O., 40 mg at 03/07/24 1525    acetaminophen (Tylenol) tablet 1,000 mg, 1,000 mg, Oral, TID PRN, David Evans M.D.    bisacodyl (Dulcolax) suppository 10 mg, 10 mg, Rectal, QDAY PRN, David Evans M.D.     bisacodyl (Dulcolax) suppository 10 mg, 10 mg, Rectal, QDAY PRN, Valerie Walls M.D., 10 mg at 03/05/24 1312    normal saline flush 0.9 % SOLN 5-10 mL, 5-10 mL, Intravenous, Q12H PRN (RT), JUAREZ Villafana, 10 mL at 03/05/24 2022    lidocaine jelly (Uro-Jet) 2 %, , Topical, Once PRN, KOLE Roblero-ARISTIDES    senna-docusate (Pericolace Or Senokot S) 8.6-50 MG per tablet 2 Tablet, 2 Tablet, Oral, Q EVENING, 2 Tablet at 03/07/24 1603 **AND** polyethylene glycol/lytes (Miralax) Packet 1 Packet, 1 Packet, Oral, QDAY PRN, KOLE Galloway-ARISTIDES, 1 Packet at 03/03/24 0244    lidocaine (Asperflex) 4 % patch 1 Patch, 1 Patch, Transdermal, DAILY, Valerie Walls M.D., 1 Patch at 03/04/24 0436    morphine 4 MG/ML injection 2 mg, 2 mg, Intravenous, Q4HRS PRN, Valerie Walls M.D.    oxyCODONE immediate-release (Roxicodone) tablet 5 mg, 5 mg, Oral, Q4HRS PRN, Valerie Walls M.D., 5 mg at 03/07/24 1604    atorvastatin (Lipitor) tablet 20 mg, 20 mg, Oral, Q EVENING, Aurea Grace M.D., 20 mg at 03/07/24 1603    tamsulosin (Flomax) capsule 0.4 mg, 0.4 mg, Oral, DAILY, Aurea Grace M.D., 0.4 mg at 03/07/24 0432    ondansetron (Zofran) syringe/vial injection 4 mg, 4 mg, Intravenous, Q4HRS PRN, Aurea Grace M.D.    ondansetron (Zofran ODT) dispertab 4 mg, 4 mg, Oral, Q4HRS PRN, Aurea Grace M.D.    Icy Hot Balm Extra Strength 7.6-29 % topical ointment, , Topical, PRN, BEBE GallowayAGISELA.    Physical Examination:    Vitals:    03/07/24 1130 03/07/24 1135 03/07/24 1544 03/07/24 1635   BP: (!) 159/110 (!) 157/110 (!) 172/116 (!) 162/95   Pulse: 95  (!) 109 (!) 106   Resp:   19 14   Temp:   36.3 °C (97.4 °F)    TempSrc:   Temporal    SpO2:  97% 94%    Weight:       Height:           General:   Patient is awake and in no acute distress  Neck: Full range of motion  Eyes: Midline, Pupils reactive to light.  CV: RRR  Lungs: No respiratory distress  Extremities: No cyanosis, warm, no significant  edema.    NEUROLOGICAL EXAM:   Mental status: Awake, alert and fully oriented, follows commands  Speech and language: speech is fluent and not dysarthric. The patient is able to name and repeat.  Cranial nerve exam: Pupils are equal, round and reactive to light bilaterally. Visual fields: He has right visual field cut. Extraocular muscles are intact. Sensation in the face is intact to light touch. Face is symmetric. Hearing to finger rub equal. Palate elevates symmetrically. Shoulder shrug is full. Tongue is midline.  Motor exam: Sustain antigravity in all 4 extremities with no downward drift. Tone is normal. No abnormal movements were seen on exam.  Sensory exam: No sensory deficits identified   Coordination: no gross ataxia noted on exam  Plantar reflexes: Equivocal  Gait: deferred    NIH Stroke Scale:    1a. Level of Consciousness (Alert, drowsy, etc): 0= Alert    1b. LOC Questions (Month, age): 0= Answers both correctly    1c. LOC Commands (Open/close eyes make fist/let go): 0= Obeys both correctly    2.   Best Gaze (Eyes open - patient follows examiner's finger on face): 0= Normal    3.   Visual Fields (introduce visual stimulus/threat to patient's field quadrants): 2= Complete Hemianopia  4.   Facial Paresis (Show teeth, raise eyebrows and squeeze eyes shut): 0= Normal     5a. Motor Arm - Left (Elevate arm to 90 degrees if patient is sitting, 45 degrees if  supine): 0= No drift    5b. Motor Arm - Right (Elevate arm to 90 degrees if patient is sitting, 45 degrees if supine): 0= No drift    6a. Motor Leg - Left (Elevate leg 30 degrees with patient supine): 0= No drift    6b. Motor Leg - Right  (Elevate leg 30 degrees with patient supine): 0= No drift    7.   Limb Ataxia (Finger-nose, heel down shin): 0= No ataxia    8.   Sensory (Pin prick to face, arm, trunk and leg - compare side to side): 0= Normal    9.  Best Language (Name item, describe a picture and read sentences): 0= No aphasia    10. Dysarthria  (Evaluate speech clarity by patient repeating listed words): 0= Normal articulation    11. Extinction and Inattention (Use information from prior testing to identify neglect or  double simultaneous stimuli testing): 0= No neglect    Total NIH Score: 2    Baseline modified Jose A Scale (MRS): 0 = No symptoms    Objective Data:    Labs:  Lab Results   Component Value Date/Time    PROTHROMBTM 15.2 (H) 03/03/2024 10:51 AM    INR 1.19 (H) 03/03/2024 10:51 AM      Lab Results   Component Value Date/Time    WBC 10.4 03/07/2024 12:47 AM    RBC 4.06 (L) 03/07/2024 12:47 AM    HEMOGLOBIN 12.0 (L) 03/07/2024 12:47 AM    HEMATOCRIT 34.7 (L) 03/07/2024 12:47 AM    MCV 85.5 03/07/2024 12:47 AM    MCH 29.6 03/07/2024 12:47 AM    MCHC 34.6 03/07/2024 12:47 AM    MPV 9.8 03/07/2024 12:47 AM    NEUTSPOLYS 70.20 03/06/2024 01:49 AM    LYMPHOCYTES 17.10 (L) 03/06/2024 01:49 AM    MONOCYTES 10.80 03/06/2024 01:49 AM    EOSINOPHILS 1.00 03/06/2024 01:49 AM    BASOPHILS 0.40 03/06/2024 01:49 AM      Lab Results   Component Value Date/Time    SODIUM 134 (L) 03/07/2024 12:47 AM    POTASSIUM 4.3 03/07/2024 12:47 AM    CHLORIDE 92 (L) 03/07/2024 12:47 AM    CO2 25 03/07/2024 12:47 AM    GLUCOSE 100 (H) 03/07/2024 12:47 AM    BUN 63 (H) 03/07/2024 12:47 AM    CREATININE 6.80 (HH) 03/07/2024 12:47 AM    CREATININE 1.0 08/18/2007 05:50 AM      Lab Results   Component Value Date/Time    CHOLSTRLTOT 128 08/17/2022 08:44 AM    LDL 66 08/17/2022 08:44 AM    HDL 53 08/17/2022 08:44 AM    TRIGLYCERIDE 45 08/17/2022 08:44 AM       Lab Results   Component Value Date/Time    ALKPHOSPHAT 289 (H) 03/04/2024 05:45 AM    ASTSGOT 33 03/04/2024 05:45 AM    ALTSGPT 14 03/04/2024 05:45 AM    TBILIRUBIN 0.4 03/04/2024 05:45 AM        Imaging/Testing:    I interpreted and/or reviewed the patient's neuroimaging    CT-CTA NECK WITH & W/O-POST PROCESSING   Final Result      1.  Prominent proximal internal carotid artery atherosclerosis with less than 50% stenosis.    2.  Patent vertebral arteries.   3.  Layering probably moderate bilateral pleural effusions. Some probable bilateral pulmonary edema.      CT-CTA HEAD WITH & W/O-POST PROCESS   Final Result      1.  Acute/subacute appearing and chronic right occipital infarcts.   2.  No large vessel occlusion or aneurysm.      DX-CHEST-PORTABLE (1 VIEW)   Final Result      1.  Increased perihilar interstitial markings. Findings may represent interstitial edema or atypical infection.      2.  Mild bibasilar opacities likely represent atelectasis.      IR-PERQ NEPH CATH NEW ACCESS (ALL RADIOLOGY) RIGHT   Final Result         1. Ultrasound And Fluoroscopic Guided Placement Of a right sided 8 Tongan  Pigtail Locking Loop Percutaneous Nephrostomy Catheter.      2. Obstruction of the distal right ureter is demonstrated with no flow of contrast into the urinary bladder.      2. Nephrostogram Showing Satisfactory Catheter Position Without Extravasation.      NM-RENAL WITH DIURETIC WASHOUT   Final Result      1. Normal renal flow and function scan of the left kidney.   2. Decreased uptake and excretion of the radiotracer by the right kidney. Findings suggest chronic right hydroureteronephrosis at the distal ureteral level with resultant right renal cortical atrophy.      OUTSIDE IMAGES-CT ABDOMEN /PELVIS   Final Result          Assessment:  Kaleb Yanes is a 73 y.o. right-handed male with past medical history significant for coronary artery disease, hypertension, hyperlipidemia who was admitted on 3/1/2024 for evaluation of flank pain and was found to have acute renal failure due to ureteral obstruction.  He is getting treatment under care of nephrology, urology and internal medicine.  Last night he had some confusional state and this morning his wife noted he has difficulty seeing objects in his right peripheral vision.  A brain CT obtained which revealed hypodensities in bilateral occipital head region concerning for infarct.   I reviewed his CT and there appeared to be some mild petechial hemorrhage as well.  CT angiogram of the head and neck revealed no evidence of large vessel occlusion however there are some atherosclerotic disease.    Plan:  -q4h and PRN neuro assessment. VS per nursing/unit protocol.   -BP goal is normotension, 100-130/60-80. Antihypertensives per primary team.   -Obtain MRI Brain wo contrast.   -Telemetry; currently SR. Screen for Afib/arrhythmia. Obtain TTE .  -Given possible hemorrhagic transformation we will hold on antiplatelet therapy for now till MRI is done.  -Atorvastatin 80 mg PO q HS. Check lipid panel.  Goal LDL less than 70  -Recommend aggressive BG management per primary team. Avoid IVF with Dextrose. -BG goal . Check hemoglobin A1c.  Goal < 7  -PT/OT/SLP eval and treat for early mobilization.  -All other medical management per primary team.   -DVT PPX: SCDs.      The plan of care above has been discussed with David Evans M.D.    I spent a total of 52 minutes reviewing hospital notes, neuroimaging, evaluating the patient and documenting in the EMR.      Please note that this dictation was created using voice recognition software. I have made every reasonable attempt to correct obvious errors, but I expect that there are errors of grammar and possibly content that I did not discover before finalizing the note.       Jessee Chaudhary MD  [facility]

## 2024-03-08 NOTE — THERAPY
Speech Language Pathology   Communication Evaluation     Patient Name: Kaleb Yanes  AGE:  73 y.o., SEX:  male  Medical Record #: [id number]  Date of Service: 3/8/2024      History of Present Illness  73 y.o. male admitted 3/1/2024 with flank pain. 03/08 pt developed abrupt right symmetric hemianopsia yesterday, stat CTA with acute/subacute and chronic R occipital infarcts, MRI brain w/o with bilateral occipital infarcts and hemorrhagic transformation on the R, both images demonstrate osseous lesions.     PMHx metastatic prostate cancer, CAD, HTN, HLD     No previous h/o SLP services at [facility]     CMHx occipital stroke, acute respiratory failure w/hypoxia, constipation, gross hematuria, prostate ca w/mets     CXR 3/6/2024  1.  Increased perihilar interstitial markings. Findings may represent interstitial edema or atypical infection.  2.  Mild bibasilar opacities likely represent atelectasis.     MRI-Brain 3/8/2024  1.  Bilateral occipital infarcts. Hemorrhagic transformation is noted in the right occipital infarct.  2.  Abnormal T1 hypointensity of right side of C1 vertebral body and right occipital condyle. The review of CT scan of the neck demonstrates multifocal abnormal sclerotic areas. There is also destructive lesion in the right lateral condyle. These   findings are concerning for aggressive osseous process suggests metastasis. Further investigation is recommended.         General Information  Vitals  O2 (LPM): 1  O2 Delivery Device: Silicone Nasal Cannula, Room air w/o2 available  Level of Consciousness: Alert, Awake  Patient Behaviors: Tearful  Orientation: Oriented x 4  Follows Directives: Yes      Prior Living Situation & Level of Function  Prior Services: Home-Independent  Housing / Facility: [address]  Lives with - Patient's Self Care Capacity: Spouse     Communication: WFL  Swallowing: WFL       Oral Mechanism Evaluation  Dentition: Fair, Natural dentition  Facial Symmetry: Equal  Facial  Sensation: Equal  Labial Observations: WFL  Lingual Observations: Midline      Laryngeal Function Exam  Secretion Management: Adequate  Voice Quality: WFL  Cough: Perceptually WNL      Subjective  Patient cleared by RN for evaluation. Pt received awake, sitting UIC, wife at bedside. Pt reports I with ADLs/IADLs at baseline. Wife is present and supportive, able to assist PRN.      Assessment  The Western Aphasia Battery-Revised (WAB-R) Beside Version was administered. The “bedside” WAB-R is a shortened version of the Western Aphasia Battery--Revised.  The battery is used to assess a patient’s language function following stroke, dementia, or other acquired neurologic disorder.  The results of the battery provide diagnostic information as to the presence, severity, and type of aphasia. It is designed to be administered at a patient’s bedside, when there are time constraints, issues of cooperation, or comorbidities that prevent more comprehensive testing.  The following results were obtained:     Spontaneous Speech  Content: 7/10  Fluency: 8/10  Auditory Verbal Comprehension  Yes/No Questions: 10/10  Sequential Commands: 8/10  Repetition: 10/10  Object Naming: 10/10    Bedside Western Aphasia Battery (WAB)  Bedside Aphasia Score: 88.3  Apraxia Score: 10  Bedside Aphasia Classification: Anomic      The Bedside Aphasia Score is an essential summary value of an individual's aphasic deficit. It is proportional to the severity of aphasia regardless of the type or etiology. The aphasia quotient is useful in assisting and distinguishing between aphasic and non-aphasic patients. An AQ of 0-25 is very severe; an AQ of 26-50 is severe; an AQ of 51-75 is moderate; and an AQ of 76 and above is mild.  The patient may be considered normal or not aphasic if the aphasia quotient is 93.8 or above.     Comments: Logopenic fluent speech with minimal pauses/fillers, infrequent paraphasia in spontaneous speech. Breakdown with more complex  "commands; patient requesting repetition with some improvement. Stimulable for word retrieval with phonemic cues. Patient/family eager for therapy.     Of note: unable to utilize standardized assessment for reading/writing this date 2/2 visual deficits. Given simple sentences in large print, pt able to read without difficulty.      Clinical Impressions  Patient presents with a mild fluent aphasia, most consistent with anomic subtype per objective assessment. Service will continue to follow for language therapy targeting word retrieval, complex command following, visual neglect and to formally assess reading/writing.        NOTE: It is not within the scope of practice of Speech-Language Pathologists to determine patient capacity. Please defer to the physician or psych to complete this assessment.       Recommendations  Supervision Needs Upons Discharge: Direct assistance with IADLs (see below)  IADLs: Medication management, Financial management, Appointment management, Household chores, Cooking         SLP Treatment Plan  Treatment Plan: Speech-Language Treatment, Patient/Family/Caregiver Training  SLP Frequency: 3x Per Week  Estimated Duration: Until Therapy Goals Met      Anticipated Discharge Needs  Discharge Recommendations: Recommend home health for continued speech therapy services  Therapy Recommendations Upon DC: Expression Training, Reading Training, Writing Training, Community Re-Integration, Patient / Family / Caregiver Education      Patient / Family Goals  Patient / Family Goal #1: \"We will do whatever it takes\" - pt's wife  Short Term Goal # 1: Provided a visual field pt will identify items on the R-side with >90% accuracy given min cues  Short Term Goal # 2: Utilizing semantic feature analysis and mod cues, pt will provide 3 attributes per item  Short Term Goal # 3: With mod cues, pt will follow complex directions with >85% accuracy  Short Term Goal # 4: Pt will complete a reading/writing task with >85% " accuracy given mod cues      Shazia Smith, SLP

## 2024-03-08 NOTE — ASSESSMENT & PLAN NOTE
Resolved with diuresis  Due to noncardiogenic pulmonary edema from NEREIDA  CXR increased interstitial edema  EKG not indicative of acute ischemic changes  Diuresis as tolerated  On RA  Hypoxia resolved

## 2024-03-08 NOTE — THERAPY
Occupational Therapy   Initial Evaluation     Patient Name: Kaleb Yanes  Age:  73 y.o., Sex:  male  Medical Record #: [id number]  Today's Date: 3/8/2024     Precautions  Precautions: Fall Risk  Comments: R homonomous hemianopsia    Assessment  Patient is 73 y.o. male admitted for acute renal failure d/t uretal obstruction s/p nephrostomy tube, hydronephrosis, acute respiratory failure, NEREIDA, and then acute on chronic B occipital infarcts while in house. PMHx of prostate CA with bone mets, CAD, HTN, and chronic occipital infarcts. Completed ADLs/txfs with min A and functional ambulation w/o AD and min A for navigation. Wife reports is present and supportive. Able to assist PRN. Currently limited by decreased cognition, coordination, navigation, and vision (R homonomous hemianopsia) which are currently affecting pt's ability to complete ADLs/IADLs at baseline. Will continue to follow.     Plan    Occupational Therapy Initial Treatment Plan   Treatment Interventions: Self Care / Activities of Daily Living, Adaptive Equipment, Therapeutic Activity, Therapeutic Exercises  Treatment Frequency: 2 Times per Week  Duration: Until Therapy Goals Met    DC Equipment Recommendations: Tub / Shower Seat  Discharge Recommendations: Recommend home health for continued occupational therapy services      Objective     03/08/24 0926   Prior Living Situation   Prior Services Home-Independent   Housing / Facility 1 [address] House   Steps Into Home 3   Bathroom Set up Bathtub / Shower Combination   Equipment Owned None   Lives with - Patient's Self Care Capacity Spouse   Comments Wife reports is present and supportive. Able to assist PRN.   Prior Level of ADL Function   Self Feeding Independent   Grooming / Hygiene Independent   Bathing Independent   Dressing Independent   Toileting Independent   Prior Level of IADL Function   Medication Management Independent   Laundry Independent   Kitchen Mobility Independent   Finances  "Independent   Home Management Independent   Shopping Independent   Prior Level Of Mobility Independent Without Device in Community;Independent Without Device in Home   Driving / Transportation Driving Independent   Comments Pt very active at baseline   History of Falls   History of Falls No   Precautions   Precautions Fall Risk   Comments R homonomous hemianopsia   Vitals   O2 (LPM) 1   O2 Delivery Device Silicone Nasal Cannula;Room air w/o2 available   Vitals Comments No desaturation on RA in standing, but desaturation to 86-87% in supine. Redonned 1L O2 post activity   Pain 0 - 10 Group   Therapist Pain Assessment During Activity;Nurse Notified;Post Activity Pain Same as Prior to Activity  (no c/o pain)   Cognition    Cognition / Consciousness X   Speech/ Communication Delayed Responses;Word Finding Impairment   Level of Consciousness Alert   Safety Awareness Impaired   Comments Pt and wife report pt has delayed processing, word finding deficits, and feels \"stupid\". Pt req v/cs for safety and repeated v/cs for sequencing. Acknowleged frustration and educated on stroke/brain injury and recovery.   Passive ROM Upper Body   Passive ROM Upper Body WDL   Active ROM Upper Body   Active ROM Upper Body  WDL   Strength Upper Body   Upper Body Strength  WDL   Sensation Upper Body   Upper Extremity Sensation  WDL   Coordination Upper Body   Coordination X   Comments opposition WFL despite visual deficits. GM limited by vision   Balance Assessment   Sitting Balance (Static) Good   Sitting Balance (Dynamic) Fair +   Standing Balance (Static) Fair   Standing Balance (Dynamic) Fair   Weight Shift Sitting Good   Weight Shift Standing Fair   Comments w/o AD; no overt LOB   Bed Mobility    Supine to Sit Supervised   Sit to Supine Supervised   Scooting Supervised   Comments HOB slightly elevated   ADL Assessment   Eating Minimal Assist   Grooming Minimal Assist   Upper Body Dressing Minimal Assist   Lower Body Dressing Minimal " Assist   Toileting Contact Guard Assist   Comments req min A for navigating, adaptive techniques, and for safety; pt has strength and AROM to complete w/o physical assistance. Educated pt and wife on home safety, adaptive techniques for ADL/txfs, visual deficits, adaptive clock technique for giving directions and lighthouse technique, neurological recovery, overstimulation, OTHH for vision therapy, and importance of continued OOB activity.   mRS Prior to admission   Prior to admission mRS 0   Modified Jose A (mRS)   Modified Jose A Score 3   Functional Mobility   Sit to Stand Standby Assist   Bed, Chair, Wheelchair Transfer Standby Assist   Toilet Transfers Contact Guard Assist   Transfer Method Stand Step   Mobility w/o AD in room just v/cs for navigation   Visual Perception   Visual Perception  X   Visual Acuity Impaired Bilaterally  (can see general shapes and colors, but blurry. unable to read)   Visual Fields Right Field Cut;Left Field Cut  (R lateral field and L nasal field)   Figure Ground Impaired   Comments R homonomous hemianopsia   Edema / Skin Assessment   Edema / Skin  Not Assessed   Activity Tolerance   Comments limited by vision and cognition/frustration   Patient / Family Goals   Patient / Family Goal #1 to go home   Short Term Goals   Short Term Goal # 1 will complete vision exercises/adaptive techniques w/SPV   Short Term Goal # 2 standing g/h with SPV using BUEs and using adaptive techniques for scanning environment w/min v/cs   Education Group   Education Provided Role of Occupational Therapist;Home Safety;Stroke;Pathology of bedrest;Activities of Daily Living   Role of Occupational Therapist Patient Response Patient;Acceptance;Explanation;Verbal Demonstration;Reinforcement Needed;Family   Home Safety Patient Response Patient;Family;Acceptance;Explanation;Verbal Demonstration;Reinforcement Needed   Stroke Patient Response Patient;Acceptance;Explanation;Verbal Demonstration;Reinforcement  Needed;Family   ADL Patient Response Patient;Family;Acceptance;Explanation;Verbal Demonstration;Reinforcement Needed   Pathology of Bedrest Patient Response Patient;Family;Acceptance;Explanation;Verbal Demonstration;Reinforcement Needed   Occupational Therapy Initial Treatment Plan    Treatment Interventions Self Care / Activities of Daily Living;Adaptive Equipment;Therapeutic Activity;Therapeutic Exercises   Treatment Frequency 2 Times per Week   Duration Until Therapy Goals Met   Problem List   Problem List Impaired Vision;Impaired Coordination Right Upper Extremity;Impaired Coordination Left Upper Extremity;Impaired Cognitive Function

## 2024-03-09 PROBLEM — R11.0 NAUSEA: Status: ACTIVE | Noted: 2024-03-09

## 2024-03-09 LAB
ALBUMIN SERPL BCP-MCNC: 3.2 G/DL (ref 3.2–4.9)
ANION GAP SERPL CALC-SCNC: 21 MMOL/L (ref 7–16)
BUN SERPL-MCNC: 79 MG/DL (ref 8–22)
CALCIUM ALBUM COR SERPL-MCNC: 8.5 MG/DL (ref 8.5–10.5)
CALCIUM SERPL-MCNC: 7.9 MG/DL (ref 8.5–10.5)
CHLORIDE SERPL-SCNC: 92 MMOL/L (ref 96–112)
CO2 SERPL-SCNC: 24 MMOL/L (ref 20–33)
CREAT SERPL-MCNC: 10.13 MG/DL (ref 0.5–1.4)
GFR SERPLBLD CREATININE-BSD FMLA CKD-EPI: 5 ML/MIN/1.73 M 2
GLUCOSE SERPL-MCNC: 84 MG/DL (ref 65–99)
PHOSPHATE SERPL-MCNC: 7.3 MG/DL (ref 2.5–4.5)
POTASSIUM SERPL-SCNC: 4.7 MMOL/L (ref 3.6–5.5)
SODIUM SERPL-SCNC: 137 MMOL/L (ref 135–145)

## 2024-03-09 PROCEDURE — 700102 HCHG RX REV CODE 250 W/ 637 OVERRIDE(OP): Performed by: INTERNAL MEDICINE

## 2024-03-09 PROCEDURE — A9270 NON-COVERED ITEM OR SERVICE: HCPCS | Performed by: INTERNAL MEDICINE

## 2024-03-09 PROCEDURE — 700101 HCHG RX REV CODE 250: Performed by: HOSPITALIST

## 2024-03-09 PROCEDURE — 700111 HCHG RX REV CODE 636 W/ 250 OVERRIDE (IP): Performed by: STUDENT IN AN ORGANIZED HEALTH CARE EDUCATION/TRAINING PROGRAM

## 2024-03-09 PROCEDURE — 700102 HCHG RX REV CODE 250 W/ 637 OVERRIDE(OP)

## 2024-03-09 PROCEDURE — 51798 US URINE CAPACITY MEASURE: CPT

## 2024-03-09 PROCEDURE — 36415 COLL VENOUS BLD VENIPUNCTURE: CPT

## 2024-03-09 PROCEDURE — A9270 NON-COVERED ITEM OR SERVICE: HCPCS

## 2024-03-09 PROCEDURE — 99232 SBSQ HOSP IP/OBS MODERATE 35: CPT | Performed by: STUDENT IN AN ORGANIZED HEALTH CARE EDUCATION/TRAINING PROGRAM

## 2024-03-09 PROCEDURE — 700102 HCHG RX REV CODE 250 W/ 637 OVERRIDE(OP): Performed by: STUDENT IN AN ORGANIZED HEALTH CARE EDUCATION/TRAINING PROGRAM

## 2024-03-09 PROCEDURE — A9270 NON-COVERED ITEM OR SERVICE: HCPCS | Performed by: STUDENT IN AN ORGANIZED HEALTH CARE EDUCATION/TRAINING PROGRAM

## 2024-03-09 PROCEDURE — 770020 HCHG ROOM/CARE - TELE (206)

## 2024-03-09 PROCEDURE — 80069 RENAL FUNCTION PANEL: CPT

## 2024-03-09 RX ORDER — SEVELAMER CARBONATE 800 MG/1
800 TABLET, FILM COATED ORAL
Status: DISCONTINUED | OUTPATIENT
Start: 2024-03-09 | End: 2024-03-10

## 2024-03-09 RX ORDER — ONDANSETRON 4 MG/1
4 TABLET, ORALLY DISINTEGRATING ORAL
Status: DISCONTINUED | OUTPATIENT
Start: 2024-03-09 | End: 2024-03-14 | Stop reason: HOSPADM

## 2024-03-09 RX ORDER — TORSEMIDE 5 MG/1
10 TABLET ORAL
Status: DISCONTINUED | OUTPATIENT
Start: 2024-03-09 | End: 2024-03-12

## 2024-03-09 RX ADMIN — TORSEMIDE 10 MG: 5 TABLET ORAL at 14:42

## 2024-03-09 RX ADMIN — DOCUSATE SODIUM 50 MG AND SENNOSIDES 8.6 MG 2 TABLET: 8.6; 5 TABLET, FILM COATED ORAL at 18:08

## 2024-03-09 RX ADMIN — AMLODIPINE BESYLATE 5 MG: 5 TABLET ORAL at 04:59

## 2024-03-09 RX ADMIN — LIDOCAINE 1 PATCH: 4 PATCH TOPICAL at 04:59

## 2024-03-09 RX ADMIN — POLYETHYLENE GLYCOL 3350 1 PACKET: 17 POWDER, FOR SOLUTION ORAL at 18:08

## 2024-03-09 RX ADMIN — LABETALOL HYDROCHLORIDE 200 MG: 200 TABLET, FILM COATED ORAL at 21:35

## 2024-03-09 RX ADMIN — TAMSULOSIN HYDROCHLORIDE 0.4 MG: 0.4 CAPSULE ORAL at 04:58

## 2024-03-09 RX ADMIN — ONDANSETRON 4 MG: 4 TABLET, ORALLY DISINTEGRATING ORAL at 13:09

## 2024-03-09 RX ADMIN — LABETALOL HYDROCHLORIDE 200 MG: 200 TABLET, FILM COATED ORAL at 09:05

## 2024-03-09 RX ADMIN — ONDANSETRON 4 MG: 4 TABLET, ORALLY DISINTEGRATING ORAL at 18:09

## 2024-03-09 RX ADMIN — LABETALOL HYDROCHLORIDE 200 MG: 200 TABLET, FILM COATED ORAL at 14:42

## 2024-03-09 RX ADMIN — FUROSEMIDE 80 MG: 10 INJECTION, SOLUTION INTRAVENOUS at 04:58

## 2024-03-09 RX ADMIN — ATORVASTATIN CALCIUM 20 MG: 20 TABLET, FILM COATED ORAL at 18:08

## 2024-03-09 RX ADMIN — SEVELAMER CARBONATE 800 MG: 800 TABLET, FILM COATED ORAL at 18:09

## 2024-03-09 ASSESSMENT — ENCOUNTER SYMPTOMS
NERVOUS/ANXIOUS: 0
WEAKNESS: 0
BACK PAIN: 0
VOMITING: 0
NECK PAIN: 0
FOCAL WEAKNESS: 0
MYALGIAS: 0
DEPRESSION: 0
FLANK PAIN: 0
NAUSEA: 1

## 2024-03-09 ASSESSMENT — PAIN DESCRIPTION - PAIN TYPE: TYPE: ACUTE PAIN

## 2024-03-09 NOTE — CARE PLAN
The patient is Stable - Low risk of patient condition declining or worsening    Shift Goals  Clinical Goals: Monitor neuro status  Patient Goals: rest  Family Goals: monitor for new symptoms    Progress made toward(s) clinical / shift goals:  Patient is A0x4, his right visual field improved compare to previous assessments. Safety measures done. Hourly rounds to ensure safety and comfort.    Patient is not progressing towards the following goals:

## 2024-03-09 NOTE — CARE PLAN
The patient is Stable - Low risk of patient condition declining or worsening    Shift Goals  Clinical Goals: Monitor Neuro Status  Patient Goals: Rest  Family Goals: monitor for new symptoms    Progress made toward(s) clinical / shift goals:    Problem: Knowledge Deficit - Standard  Goal: Patient and family/care givers will demonstrate understanding of plan of care, disease process/condition, diagnostic tests and medications  Outcome: Progressing     Problem: Urinary Elimination  Goal: Establish and maintain regular urinary output  Outcome: Progressing     Problem: Mobility  Goal: Patient's capacity to carry out activities will improve  Outcome: Progressing       Patient is not progressing towards the following goals:

## 2024-03-09 NOTE — PROGRESS NOTES
Monitor summary: SR 78-87, AR .16, QRS .08, QT .41 with rare PVC's and PAC's per strip from monitor room.

## 2024-03-09 NOTE — PROGRESS NOTES
[facility] -  PROGRESS NOTE               Author: Kemi Kelly D.O. Date & Time: 3/9/2024  1:21 PM     HPI:  72 yo male from [facility] with PMH significant for metastatic prostate cancer -  on hormone blocking medication, due for next injection in March. , CAD s/p coronary artery stent placemen (8/2007), HTN, and HLD who presented to OSH with hematuria associated with worsening left sided flank and back pain x 2 days. At OSH he was diagnosed with acute renal failure with presenting Crt of 7 and was transported to [facility] for higher level of care on 3/1/24. Previous Crt level noted to be 0.92 in July 2022 and 2 weeks prior to admission his creatinine was 1.37 . On arrival he had a Lasix renal scan with diuretic which revealed normal renal flow and function of the left kidney with decreased uptake and excretion by the right kidney; and findings suggestive of chronic right hydroureteronephrosis at the distal ureteral level with resultant right renal cortical atrophy. Urology was consulted and recommended a Arreola catheter placement. His Crt improved to 4.92 and further to 4.0 before worsening again to 5.13 today. A Rt Nephrostomy tube was placed by AK today to see if this will help improve renal functions. His flank pain has since resolved. Hematuria has cleared up as well. No F/C/N/V/CP/SOB.  No melena, hematochezia, hematemesis.  No HA, visual changes, or abdominal pain. Nephrology consulted for management of NEREIDA and possible need for dialysis      DAILY NEPHROLOGY SUMMARY:  03/04 - consult done  03/05 - SCr trended up, arreola dislodged and not replaced due to PVR 0, SBP 140s-160s, UOP 1075mL, no freely voided urine recorded, UA with moderate occult blood, 0-2 hyaline casts, no protein, no new c/o, reports that he is finally voiding and urine via bladder is clearing  03/06 - SCr trending down, UOP 970mL recorded, SBP 140s-170s, no new c/o, has been walking laps, wife encouraging him to  "increase PO intake  03/07 - SCr stable, UOP 1600mL, SBP 130s-160s, has c/o SOB overnight, mild tachypnea noted, CXR with some increased interstitial markings, IVF stopped this AM, reports now SOB improved and good UOP via NPT, +constipation ongoing, no other new c/o  03/08 - SBP 130s-170s, developed abrupt right symmetric hemianopsia yesterday, stat CTA with acute/subacute and chronic R occipital infarcts, MRI brain w/o with bilateral occipital infarcts and hemorrhagic transformation on the R, both images demonstrate osseous lesions, patient reports he believes his visual deficits may be slightly improved from yesterday, UOP 2L, SCr trended up to 8.95, +fatigue, no other new c/o  03/09 - SBP 100s-140s, UOP 1.2L, Scr trending up, vision slightly improved, ongoing fatigue, no new c/o but wife reports poor urine output via bladder, that patient feels he needs to go but then doesn't produce urine    REVIEW OF SYSTEMS:    10 point ROS reviewed and is as per HPI/daily summary or otherwise negative    PMH/PSH/SH/FH: Reviewed and unchanged since admission note  CURRENT MEDICATIONS: Reviewed from admission to present day    VS:  /73   Pulse 87   Temp 36.5 °C (97.7 °F) (Temporal)   Resp 18   Ht 1.702 m (5' 7\")   Wt 60.4 kg (133 lb 2.5 oz)   SpO2 92%   BMI 20.86 kg/m²   Physical Exam  Vitals and nursing note reviewed.   Constitutional:       General: He is not in acute distress.  HENT:      Head: Normocephalic and atraumatic.      Right Ear: External ear normal.      Left Ear: External ear normal.      Nose: Nose normal.      Mouth/Throat:      Mouth: Mucous membranes are moist.      Pharynx: Oropharynx is clear.   Eyes:      General: No scleral icterus.     Extraocular Movements: Extraocular movements intact.   Cardiovascular:      Rate and Rhythm: Normal rate and regular rhythm.   Pulmonary:      Effort: Pulmonary effort is normal.      Breath sounds: No wheezing or rales.   Abdominal:      General: Abdomen is " flat. There is no distension.   Musculoskeletal:      Right lower leg: No edema.      Left lower leg: No edema.   Skin:     General: Skin is warm and dry.      Findings: No bruising.   Neurological:      Mental Status: He is alert and oriented to person, place, and time.      Motor: No weakness.   Psychiatric:         Mood and Affect: Mood normal.         Behavior: Behavior normal.         Thought Content: Thought content normal.         Judgment: Judgment normal.         Fluids:  In: -   Out: 1200     LABS:  Recent Labs     03/07/24  0047 03/08/24  0420 03/09/24  0150   SODIUM 134* 136 137   POTASSIUM 4.3 4.8 4.7   CHLORIDE 92* 91* 92*   CO2 25 24 24   GLUCOSE 100* 77 84   BUN 63* 72* 79*   CREATININE 6.80* 8.95* 10.13*   CALCIUM 8.0* 8.1* 7.9*     Imaging: reviewed    IMPRESSION:  # NEREIDA, worsening              - baseline Crt around 1.37 per labs 2 weeks prior to admission  - Presenting Crt 7 at OSH, improved to 4.0 after arreola placement but has worsened again  - Likely cause of initial NEREIDA: Post renal obstruction, VS ATN (of note, patient was taking NSAIDs for flank pain), now likely with additional contrast injury  # CKD 3, NOS  #Atrophic /Echogenic Rt kidney               - Lasix renal scan 3/1/24: Normal renal flow and function scan of the left kidney.              chronic right hydroureteronephrosis at the distal ureteral level with resultant right renal cortical atrophy; s/p R nephrostomy  # Mild Hyperkalemia   # Severe Metabolic Acidosis, improved  # Metastatic Prostate Cancer to bones               - started on Firmagon a month ago   # Gross Hematuria, resolved   # Constipation  # Occipital CVA  # HTN  - goal <140/90  - not at goal        PLAN:  - No compelling indication for RRT, though patient/wife aware renal function may worsen, patient agreeable to dialysis if/when needed  -  continue BBL  - continue amlodipine, ok to adjust dose if needed  - DC IVF lasix, start torsemide 10mg qday  - Monitor urine  output and Rt Nephrostomy output closely   - Daily labs   - Avoid Nephrotoxins, IV contrast  - Daily evaluation for RRT needs  - Dose all meds per eGFR < 15  - strict I/O  - he understands he is on a renal diet  - start sevelemar 800mg TID with meals  - IS use encouraged  - bladder scan; if >300 recommend straight cath and q6 hour bladder scans, if >300 again, would replace arreola    Other management per primary service

## 2024-03-09 NOTE — CARE PLAN
The patient is Stable - Low risk of patient condition declining or worsening    Shift Goals  Clinical Goals: Monitor Neuros  Patient Goals: Rest, Drink  Family Goals: monitor    Progress made toward(s) clinical / shift goals:    Problem: Knowledge Deficit - Standard  Goal: Patient and family/care givers will demonstrate understanding of plan of care, disease process/condition, diagnostic tests and medications  Outcome: Progressing     Problem: Urinary Elimination  Goal: Establish and maintain regular urinary output  Outcome: Progressing     Problem: Mobility  Goal: Patient's capacity to carry out activities will improve  Outcome: Progressing     Problem: Self Care  Goal: Patient will have the ability to perform ADLs independently or with assistance (bathe, groom, dress, toilet and feed)  Outcome: Progressing       Patient is not progressing towards the following goals:

## 2024-03-09 NOTE — THERAPY
Physical Therapy   Initial Evaluation     Patient Name: Kaleb Yanes  Age:  73 y.o., Sex:  male  Medical Record #: [id number]  Today's Date: 3/8/2024       Assessment  Patient is 73 y.o. male who presented 3/1/2024 with acute renal failure due to ureteral obstruction.     Currently been managed for B/L occipital stroke, acute respiratory failure with hypoxia, metabolic acidosis, NEREIDA, hydronephrosis with urinary obstruction due to renal calculus, gross hematuria, hyperkalemia  Underwent R nephrostomy placement 3/4/2024     PMH: Prostate cancer with METs to intrabdominal lymph node and bones, CAD, HLD, HTN    Patient seen for PT evaluation. Patient in bed, agreeable for the session. Able to demonstrate functional mobility tasks as detailed below. Will continue to benefit from PT services to help improve overall functional mobility. Recommend  PT at this time.      Plan    Physical Therapy Initial Treatment Plan   Treatment Plan : Bed Mobility, Gait Training, Therapeutic Activities, Therapeutic Exercise  Treatment Frequency: 3 Times per Week  Duration: Until Therapy Goals Met    DC Equipment Recommendations: Unable to determine at this time  Discharge Recommendations: Recommend home health for continued physical therapy services    Objective       03/08/24 1230   Initial Contact Note    Initial Contact Note Order Received and Verified, Physical Therapy Evaluation in Progress with Full Report to Follow.   Precautions   Precautions Fall Risk;Other (See Comments)   Comments SBP goal < 130, R nephrostomy tube (+), vision deficits   Vitals   Pulse (!) 103   Patient BP Position Sitting  (EOB)   Pulse Oximetry 94 %   O2 (LPM) 1   O2 Delivery Device Nasal Cannula   Vitals Comments Prior to ambulation, seated EOB: SpO2 94, post ambulation in room air, SpO2 88, improved to > 90 within few seconds of pursed lip breathing. Patient placed back on NC at end of session   Pain   Pain Scales 0 to 10 Scale    Intervention  Declines   Prior Living Situation   Prior Services Home-Independent   Housing / Facility 1 Story House   Steps Into Home 3   Rail Right Rail (Steps into Home)   Equipment Owned None   Lives with - Patient's Self Care Capacity Spouse   Comments Spouse mentioned that she is able to assist as needed.   Prior Level of Functional Mobility   Bed Mobility Independent   Transfer Status Independent   Ambulation Independent   Ambulation Distance Community   Assistive Devices Used None   Stairs Independent   History of Falls   History of Falls No   Cognition    Cognition / Consciousness X   Speech/ Communication Delayed Responses;Word Finding Impairment   Level of Consciousness Alert   Safety Awareness Impaired   Comments Patient appeared to have receptive aphasia during session-requiring repeated cues/ repetition, difficulty identifying body parts (mix up hand and leg)   Passive ROM Lower Body   Passive ROM Lower Body WDL   Active ROM Lower Body    Active ROM Lower Body  WDL   Strength Lower Body   Comments Grossly BLE WFL   Sensation Lower Body   Lower Extremity Sensation   X   Comments Patient said his LE appears to be numb & tingling; unclear if this was accurate   Lower Body Muscle Tone   Lower Body Muscle Tone  WDL   Coordination Lower Body    Comments Heel to shin-WNL   Vision   Vision Comments Will assess further in subsequent session; increased difficulty with locating objects in the R peripheral field of vision during the session. Unable to see objects on the R during walking, unable to locate chair in the room that was on the R   Other Treatments   Other Treatments Provided Patient educated to perform 'light house' strategy to scan the envt prior to ambulation; patient & wife educated about 1:1 supervision during mobility for safety concerns due to vision deficit.   Balance Assessment   Sitting Balance (Static) Good   Sitting Balance (Dynamic) Fair +   Standing Balance (Static) Fair   Standing Balance (Dynamic) Fair  -   Weight Shift Sitting Good   Weight Shift Standing Good   Comments Seated EOB; Standing W/O AD   Bed Mobility    Supine to Sit Supervised   Scooting Supervised   Rolling Supervised   Comments HOB flat, W/O use of rails; towards R side; cues for sequencing of task; increased time to perform the task   Gait Analysis   Gait Level Of Assist Standby Assist   Assistive Device None   Distance (Feet) 200   Deviation Bradykinetic;Other (Comment)  (Veering of gait possibly due to vision deficit)   # of Stairs Climbed 4   Level of Assist with Stairs Standby Assist   Vision Deficits Impacting Mobility R peripheral vision deficit; Patient was missing objects or getting too close to objects on the R   Comments Cues for safety and visual scanning of the envt; patient not very compliant this session. Attempted ambulation around obstacles, required intermittent cues for safety. Negotiated stairs with 1 side rail support, with no loss of balance.   Functional Mobility   Sit to Stand Standby Assist   Bed, Chair, Wheelchair Transfer Standby Assist   Transfer Method Stand Step   Mobility Bed-EOB-ambulate in the room & hallway-negotiate stairs-ambulate in the hallway & [id number]   Comments Cues for use of hand & LE placement to ensure safe descend into the chair during transfers; cues for locating the chair by turning the head to the R.   6 Clicks Assessment - How much HELP from from another person do you currently need... (If the patient hasn't done an activity recently, how much help from another person do you think he/she would need if he/she tried?)   Turning from your back to your side while in a flat bed without using bedrails? 4   Moving from lying on your back to sitting on the side of a flat bed without using bedrails? 3   Moving to and from a bed to a chair (including a wheelchair)? 3   Standing up from a chair using your arms (e.g., wheelchair, or bedside chair)? 3   Walking in hospital room? 3   Climbing 3-5 steps  with a railing? 3   6 clicks Mobility Score 19   Activity Tolerance   Sitting in Chair Post session   Patient / Family Goals    Patient / Family Goal #1 To return home   Short Term Goals    Short Term Goal # 1 Patient will perform supine-sit with HOB flat towards R and L side of the bed independently in 6 visits to get in & out of bed safely   Short Term Goal # 2 Patient will perform chair transfers towards R side without verbal cues with supervision in 6 visits   Short Term Goal # 3 Patient will ambulate 300 feet around obstacles & narrow spaces without verbal cues with supervision in 6 visits   Education Group   Education Provided Role of Physical Therapist   Role of Physical Therapist Patient Response Patient;Significant Other;Acceptance;Explanation;Verbal Demonstration   Physical Therapy Initial Treatment Plan    Treatment Plan  Bed Mobility;Gait Training;Therapeutic Activities;Therapeutic Exercise   Treatment Frequency 3 Times per Week   Duration Until Therapy Goals Met   Problem List    Problems Impaired Bed Mobility;Impaired Transfers;Impaired Ambulation;Impaired Vision;Motor Planning / Sequencing;Safety Awareness Deficits / Cognition   Anticipated Discharge Equipment and Recommendations   DC Equipment Recommendations Unable to determine at this time   Discharge Recommendations Recommend home health for continued physical therapy services   Interdisciplinary Plan of Care Collaboration   IDT Collaboration with  Nursing;Family / Caregiver   Patient Position at End of Therapy Seated;Chair Alarm On;Call Light within Reach;Tray Table within Reach;Family / Friend in Room   Session Information   Date / Session Number  3/8-1(1/3, 3/14)   Priority   (Acute CVA-Occipital)

## 2024-03-09 NOTE — PROGRESS NOTES
[facility] Medicine Daily Progress Note    Date of Service  3/8/2024    Chief Complaint  Kaleb Yanes is a 73 y.o. male admitted 3/1/2024 with flank pain    Hospital Course  Kaleb Yanes is a 73 y.o. male who presented 3/1/2024 with acute renal failure due to ureteral obstruction.  The patient lives in [facility] had been having increased left-sided flank pain and back pain for which she has been taking ibuprofen this morning he woke up and had 3 episodes of hematuria and went to the emergency department where he was found to have acute renal failure and hyperkalemia 2 weeks ago his creatinine was 1.37 and on day of admission it was 7, potassium was 5.4.     Patient stated he initially attributed his flank pain and back pain to his bony metastases from his prostate cancer.  He would not have gone into the emergency room except for the hematuria, after the initial 3 episodes however his hematuria has now resolved, he denies any fever, chills, dysuria he thinks he has been making a normal amount of urine.  His appetite has been poor since beginning of the year but his family has been through a lot of stress and he thinks that his appetite is starting to improve he has not had any nausea or vomiting he does not describe anything that sounds like renal colic.    Interval Problem Update    MINNIE ON  He has improved vision in his Right periphery.  His wife notes his word-finding and disorientation have slightly improved.  He and his wife have been intermittently overwhelmed by the updates but are supporting each other.  POC discussed with neurologist Dr. Chaudhary. He reports diagnostic evaluation has been completed. Recommends holding off on DOAC until interval CT to ensure the hemorrhage has been stable.  TTE reviewed, normal.  MRI brain reviewed, bilateral occipital infarcts with Right occipital hemorrhage.  BMP reviewed, Cr increased to 9.  Lipid panel reviewed, LDL 48.  Decreased atorvastatin  back to 20.  A1c 5.8.  PT/OT/SLP recommend home health.  BG 77.  -153    I have discussed this patient's plan of care and discharge plan at IDT rounds today with Case Management, Nursing, Nursing leadership, and other members of the IDT team.    Consultants/Specialty  Urology  Nephrology  IR  Neurology    Code Status  DNAR/DNI    Disposition  The patient is not medically cleared for discharge to home or a post-acute facility.  Anticipate discharge to: home with close outpatient follow-up    I have placed the appropriate orders for post-discharge needs.    Review of Systems  Review of Systems   Constitutional:  Negative for malaise/fatigue.   Genitourinary:  Negative for flank pain.   Musculoskeletal:  Negative for back pain, joint pain, myalgias and neck pain.   Neurological:  Positive for sensory change. Negative for focal weakness and weakness.   Psychiatric/Behavioral:  Positive for depression. The patient is not nervous/anxious.         Physical Exam  Temp:  [36.1 °C (97 °F)-36.7 °C (98 °F)] 36.1 °C (97 °F)  Pulse:  [] 76  Resp:  [17-18] 18  BP: (105-172)/() 105/60  SpO2:  [90 %-97 %] 93 %    Physical Exam  Vitals and nursing note reviewed. Exam conducted with a chaperone present (Wife at bedside).   Constitutional:       General: He is not in acute distress.     Appearance: Normal appearance. He is not ill-appearing, toxic-appearing or diaphoretic.   HENT:      Head: Normocephalic.      Nose: Nose normal.      Mouth/Throat:      Mouth: Mucous membranes are moist.   Eyes:      General: Visual field deficit (Symmetric RIght hemianopsia at greater than 60 degrees) present. No scleral icterus.     Conjunctiva/sclera: Conjunctivae normal.   Cardiovascular:      Rate and Rhythm: Normal rate and regular rhythm.      Pulses: Normal pulses.      Heart sounds: Normal heart sounds. No murmur heard.     No friction rub. No gallop.   Pulmonary:      Effort: Pulmonary effort is normal. No respiratory  distress.      Breath sounds: Normal breath sounds. No wheezing, rhonchi or rales.   Abdominal:      General: Abdomen is flat. Bowel sounds are normal. There is no distension.      Palpations: Abdomen is soft.      Tenderness: There is no abdominal tenderness. There is no guarding or rebound.   Genitourinary:     Comments: Right nephrostomy with yellow clear urine  Musculoskeletal:      Cervical back: Neck supple.      Right lower leg: Edema present.      Left lower leg: Edema present.      Comments: Trace pedal edema   Skin:     General: Skin is warm and dry.   Neurological:      Mental Status: He is alert.      Cranial Nerves: No dysarthria or facial asymmetry.      Motor: No weakness (5/5 BLE and BUE) or pronator drift.      Coordination: Finger-Nose-Finger Test abnormal (Improved, more pronounced across midline to Right and reaching to Right).      Comments: Inconsistently follows commands, occasional word substitution   Psychiatric:         Attention and Perception: Attention and perception normal.         Mood and Affect: Mood normal. Affect is tearful.         Speech: Speech is delayed.         Behavior: Behavior is slowed. Behavior is cooperative.         Thought Content: Thought content normal.         Cognition and Memory: Cognition and memory normal.         Judgment: Judgment normal.         Fluids    Intake/Output Summary (Last 24 hours) at 3/8/2024 1818  Last data filed at 3/8/2024 1200  Gross per 24 hour   Intake --   Output 1800 ml   Net -1800 ml       Laboratory  Recent Labs     03/06/24  0149 03/07/24  0047   WBC 11.0* 10.4   RBC 4.28* 4.06*   HEMOGLOBIN 12.6* 12.0*   HEMATOCRIT 36.7* 34.7*   MCV 85.7 85.5   MCH 29.4 29.6   MCHC 34.3 34.6   RDW 40.1 40.0   PLATELETCT 325 294   MPV 9.9 9.8     Recent Labs     03/06/24  0149 03/07/24  0047 03/08/24  0420   SODIUM 135 134* 136   POTASSIUM 4.8 4.3 4.8   CHLORIDE 92* 92* 91*   CO2 25 25 24   GLUCOSE 98 100* 77   BUN 58* 63* 72*   CREATININE 6.79* 6.80*  8.95*   CALCIUM 8.5 8.0* 8.1*               Recent Labs     03/08/24  0420   TRIGLYCERIDE 99   HDL 33*   LDL 48       Imaging  EC-ECHOCARDIOGRAM COMPLETE W/O CONT   Final Result      MR-BRAIN-W/O   Final Result      1.  Bilateral occipital infarcts. Hemorrhagic transformation is noted in the right occipital infarct.   2.  Abnormal T1 hypointensity of right side of C1 vertebral body and right occipital condyle. The review of CT scan of the neck demonstrates multifocal abnormal sclerotic areas. There is also destructive lesion in the right lateral condyle. These    findings are concerning for aggressive osseous process suggests metastasis. Further investigation is recommended.      CT-CTA NECK WITH & W/O-POST PROCESSING   Final Result   Addendum (preliminary) 1 of 1   Addendum:      There are scattered sclerotic osseous lesions seen within the thoracic    spine, manubrium and partially visualized ribs consistent with sclerotic    osseous metastatic disease.      Final      1.  Prominent proximal internal carotid artery atherosclerosis with less than 50% stenosis.   2.  Patent vertebral arteries.   3.  Layering probably moderate bilateral pleural effusions. Some probable bilateral pulmonary edema.      CT-CTA HEAD WITH & W/O-POST PROCESS   Final Result      1.  Acute/subacute appearing and chronic right occipital infarcts.   2.  No large vessel occlusion or aneurysm.      DX-CHEST-PORTABLE (1 VIEW)   Final Result      1.  Increased perihilar interstitial markings. Findings may represent interstitial edema or atypical infection.      2.  Mild bibasilar opacities likely represent atelectasis.      IR-PERQ NEPH CATH NEW ACCESS (ALL RADIOLOGY) RIGHT   Final Result         1. Ultrasound And Fluoroscopic Guided Placement Of a right sided 8 Malay  Pigtail Locking Loop Percutaneous Nephrostomy Catheter.      2. Obstruction of the distal right ureter is demonstrated with no flow of contrast into the urinary bladder.      2.  Nephrostogram Showing Satisfactory Catheter Position Without Extravasation.      NM-RENAL WITH DIURETIC WASHOUT   Final Result      1. Normal renal flow and function scan of the left kidney.   2. Decreased uptake and excretion of the radiotracer by the right kidney. Findings suggest chronic right hydroureteronephrosis at the distal ureteral level with resultant right renal cortical atrophy.      OUTSIDE IMAGES-CT ABDOMEN /PELVIS   Final Result           Assessment/Plan  * Occipital stroke (HCC)- (present on admission)  Assessment & Plan  Developed abrupt symmetric Right hemianopsia 3/7/24 with dysmetria  CT demonstrates acute-on-chronic Right occipital CVA without LVO  Neurology consulted  LDL <50, returned to atorvastatin 20 mg  Telemetry  PT/OT/SLP recommend   MRI brain demonstrates bilateral occipital infarcts with Right hemorrhage  Holding ASA/Plavix due to Right occipital hemorrhage  TTE normal  A1c 5.8 consistent with prediabetes  BG goal <180  SBP goal <130, increased lasix and titrated labetalol    Acute respiratory failure with hypoxia (HCC)  Assessment & Plan  Improving with diuresis  Due to noncardiogenic pulmonary edema from NEREIDA  CXR increased interstitial edema  EKG not indicative of acute ischemic changes  Diuresis as tolerated    Constipation- (present on admission)  Assessment & Plan  Resolved  Continue bowel regimen    High anion gap metabolic acidosis- (present on admission)  Assessment & Plan  Due to NEREIDA  Resolved with iv bicarb  following    NEREIDA (acute kidney injury) (HCC)- (present on admission)  Assessment & Plan  Due to obstruction from obstructing stone and suspected intrinsic renal disease contributing  Plateau after recent NEREIDA suggestive of ATN  Worsened after CTA for occipital CVA due to contrast nephropathy  Nephrology consulted, no indication for RRT  Bicarb gtt discontinued due to noncardiogenic pulmonary edema  Avoid nephrotoxins  Repeat AM BMP    Hydronephrosis with urinary  obstruction due to renal calculus- (present on admission)  Assessment & Plan  Vs stricture  See above  Johansen and nephrostomy R tube in place  Urology s/o  IR following    Gross hematuria- (present on admission)  Assessment & Plan  Resolved  Due to percutaneous nephrostomy  Holding ASA for nephrostomy tube and due to hematuria    Hyperkalemia- (present on admission)  Assessment & Plan  Due to NEREIDA  Resolved  Bmp in am    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 In review of yesterday's note (3/7/2024), there are no changes except as documented above.

## 2024-03-09 NOTE — PROGRESS NOTES
Monitor summary: SR/ST , [place] 0.14, QRS 0.08, QT 0.38, with rare PVCs and trigem per strip from monitor room.

## 2024-03-10 PROBLEM — I15.1 HYPERTENSION SECONDARY TO OTHER RENAL DISORDERS: Status: ACTIVE | Noted: 2024-03-10

## 2024-03-10 LAB
ALBUMIN SERPL BCP-MCNC: 3.1 G/DL (ref 3.2–4.9)
BUN SERPL-MCNC: 83 MG/DL (ref 8–22)
CALCIUM ALBUM COR SERPL-MCNC: 8.3 MG/DL (ref 8.5–10.5)
CALCIUM SERPL-MCNC: 7.6 MG/DL (ref 8.5–10.5)
CHLORIDE SERPL-SCNC: 92 MMOL/L (ref 96–112)
CO2 SERPL-SCNC: 23 MMOL/L (ref 20–33)
CREAT SERPL-MCNC: 11.4 MG/DL (ref 0.5–1.4)
GFR SERPLBLD CREATININE-BSD FMLA CKD-EPI: 4 ML/MIN/1.73 M 2
GLUCOSE SERPL-MCNC: 96 MG/DL (ref 65–99)
PHOSPHATE SERPL-MCNC: 6.4 MG/DL (ref 2.5–4.5)
POTASSIUM SERPL-SCNC: 4.7 MMOL/L (ref 3.6–5.5)
SODIUM SERPL-SCNC: 136 MMOL/L (ref 135–145)

## 2024-03-10 PROCEDURE — 700102 HCHG RX REV CODE 250 W/ 637 OVERRIDE(OP): Performed by: INTERNAL MEDICINE

## 2024-03-10 PROCEDURE — 700102 HCHG RX REV CODE 250 W/ 637 OVERRIDE(OP)

## 2024-03-10 PROCEDURE — A9270 NON-COVERED ITEM OR SERVICE: HCPCS | Performed by: INTERNAL MEDICINE

## 2024-03-10 PROCEDURE — 700102 HCHG RX REV CODE 250 W/ 637 OVERRIDE(OP): Performed by: HOSPITALIST

## 2024-03-10 PROCEDURE — 770020 HCHG ROOM/CARE - TELE (206)

## 2024-03-10 PROCEDURE — 80069 RENAL FUNCTION PANEL: CPT

## 2024-03-10 PROCEDURE — 99232 SBSQ HOSP IP/OBS MODERATE 35: CPT | Performed by: STUDENT IN AN ORGANIZED HEALTH CARE EDUCATION/TRAINING PROGRAM

## 2024-03-10 PROCEDURE — 36415 COLL VENOUS BLD VENIPUNCTURE: CPT

## 2024-03-10 PROCEDURE — 700111 HCHG RX REV CODE 636 W/ 250 OVERRIDE (IP): Performed by: STUDENT IN AN ORGANIZED HEALTH CARE EDUCATION/TRAINING PROGRAM

## 2024-03-10 PROCEDURE — A9270 NON-COVERED ITEM OR SERVICE: HCPCS | Performed by: STUDENT IN AN ORGANIZED HEALTH CARE EDUCATION/TRAINING PROGRAM

## 2024-03-10 PROCEDURE — A9270 NON-COVERED ITEM OR SERVICE: HCPCS

## 2024-03-10 PROCEDURE — 700102 HCHG RX REV CODE 250 W/ 637 OVERRIDE(OP): Performed by: STUDENT IN AN ORGANIZED HEALTH CARE EDUCATION/TRAINING PROGRAM

## 2024-03-10 PROCEDURE — A9270 NON-COVERED ITEM OR SERVICE: HCPCS | Performed by: HOSPITALIST

## 2024-03-10 RX ORDER — CALCIUM ACETATE 667 MG/1
1334 TABLET ORAL
Status: DISCONTINUED | OUTPATIENT
Start: 2024-03-10 | End: 2024-03-14

## 2024-03-10 RX ADMIN — ONDANSETRON 4 MG: 4 TABLET, ORALLY DISINTEGRATING ORAL at 09:32

## 2024-03-10 RX ADMIN — LABETALOL HYDROCHLORIDE 200 MG: 200 TABLET, FILM COATED ORAL at 21:15

## 2024-03-10 RX ADMIN — Medication 1334 MG: at 14:28

## 2024-03-10 RX ADMIN — AMLODIPINE BESYLATE 5 MG: 5 TABLET ORAL at 04:59

## 2024-03-10 RX ADMIN — Medication 1334 MG: at 09:31

## 2024-03-10 RX ADMIN — ONDANSETRON 4 MG: 4 TABLET, ORALLY DISINTEGRATING ORAL at 18:04

## 2024-03-10 RX ADMIN — DOCUSATE SODIUM 50 MG AND SENNOSIDES 8.6 MG 2 TABLET: 8.6; 5 TABLET, FILM COATED ORAL at 18:04

## 2024-03-10 RX ADMIN — BISACODYL 10 MG: 10 SUPPOSITORY RECTAL at 09:32

## 2024-03-10 RX ADMIN — Medication 1334 MG: at 18:05

## 2024-03-10 RX ADMIN — ATORVASTATIN CALCIUM 20 MG: 20 TABLET, FILM COATED ORAL at 18:04

## 2024-03-10 RX ADMIN — LABETALOL HYDROCHLORIDE 200 MG: 200 TABLET, FILM COATED ORAL at 14:28

## 2024-03-10 RX ADMIN — TORSEMIDE 10 MG: 5 TABLET ORAL at 04:59

## 2024-03-10 RX ADMIN — TAMSULOSIN HYDROCHLORIDE 0.4 MG: 0.4 CAPSULE ORAL at 04:59

## 2024-03-10 RX ADMIN — ONDANSETRON 4 MG: 4 TABLET, ORALLY DISINTEGRATING ORAL at 14:29

## 2024-03-10 RX ADMIN — LABETALOL HYDROCHLORIDE 200 MG: 200 TABLET, FILM COATED ORAL at 09:32

## 2024-03-10 ASSESSMENT — PAIN DESCRIPTION - PAIN TYPE: TYPE: ACUTE PAIN

## 2024-03-10 ASSESSMENT — ENCOUNTER SYMPTOMS
DEPRESSION: 0
BACK PAIN: 0
NERVOUS/ANXIOUS: 0
VOMITING: 0
MYALGIAS: 0
FOCAL WEAKNESS: 0
CONSTIPATION: 0
NAUSEA: 0
NECK PAIN: 0
DIARRHEA: 0
WEAKNESS: 0

## 2024-03-10 ASSESSMENT — FIBROSIS 4 INDEX: FIB4 SCORE: 2.13

## 2024-03-10 NOTE — PROGRESS NOTES
[facility] -  PROGRESS NOTE               Author: Kemi Kelly D.O. Date & Time: 3/10/2024  6:09 AM     HPI:  74 yo male from [facility] with PMH significant for metastatic prostate cancer -  on hormone blocking medication, due for next injection in March. , CAD s/p coronary artery stent placemen (8/2007), HTN, and HLD who presented to OSH with hematuria associated with worsening left sided flank and back pain x 2 days. At OSH he was diagnosed with acute renal failure with presenting Crt of 7 and was transported to [facility] for higher level of care on 3/1/24. Previous Crt level noted to be 0.92 in July 2022 and 2 weeks prior to admission his creatinine was 1.37 . On arrival he had a Lasix renal scan with diuretic which revealed normal renal flow and function of the left kidney with decreased uptake and excretion by the right kidney; and findings suggestive of chronic right hydroureteronephrosis at the distal ureteral level with resultant right renal cortical atrophy. Urology was consulted and recommended a Arreola catheter placement. His Crt improved to 4.92 and further to 4.0 before worsening again to 5.13 today. A Rt Nephrostomy tube was placed by MA today to see if this will help improve renal functions. His flank pain has since resolved. Hematuria has cleared up as well. No F/C/N/V/CP/SOB.  No melena, hematochezia, hematemesis.  No HA, visual changes, or abdominal pain. Nephrology consulted for management of NEREIDA and possible need for dialysis      DAILY NEPHROLOGY SUMMARY:  03/04 - consult done  03/05 - SCr trended up, arreola dislodged and not replaced due to PVR 0, SBP 140s-160s, UOP 1075mL, no freely voided urine recorded, UA with moderate occult blood, 0-2 hyaline casts, no protein, no new c/o, reports that he is finally voiding and urine via bladder is clearing  03/06 - SCr trending down, UOP 970mL recorded, SBP 140s-170s, no new c/o, has been walking laps, wife encouraging him to  "increase PO intake  03/07 - SCr stable, UOP 1600mL, SBP 130s-160s, has c/o SOB overnight, mild tachypnea noted, CXR with some increased interstitial markings, IVF stopped this AM, reports now SOB improved and good UOP via NPT, +constipation ongoing, no other new c/o  03/08 - SBP 130s-170s, developed abrupt right symmetric hemianopsia yesterday, stat CTA with acute/subacute and chronic R occipital infarcts, MRI brain w/o with bilateral occipital infarcts and hemorrhagic transformation on the R, both images demonstrate osseous lesions, patient reports he believes his visual deficits may be slightly improved from yesterday, UOP 2L, SCr trended up to 8.95, +fatigue, no other new c/o  03/09 - SBP 100s-140s, UOP 1.2L, Scr trending up, vision slightly improved, ongoing fatigue, no new c/o but wife reports poor urine output via bladder, that patient feels he needs to go but then doesn't produce urine  03/10 - SCr trended UOP, UOP 790mL, bladder scan <50cc, SBP 120s-140s, patient and wife believe his UOP improving, he is going to the [employer] garden today, notes some cough productive of phlegm, otherwise no new c/o    REVIEW OF SYSTEMS:    10 point ROS reviewed and is as per HPI/daily summary or otherwise negative    PMH/PSH/SH/FH: Reviewed and unchanged since admission note  CURRENT MEDICATIONS: Reviewed from admission to present day    VS:  /80   Pulse 86   Temp 35.9 °C (96.6 °F) (Temporal)   Resp 18   Ht 1.702 m (5' 7\")   Wt 61.5 kg (135 lb 9.3 oz)   SpO2 90%   BMI 21.28 kg/m²   Physical Exam  Vitals and nursing note reviewed.   Constitutional:       General: He is not in acute distress.  HENT:      Head: Normocephalic and atraumatic.      Right Ear: External ear normal.      Left Ear: External ear normal.      Nose: Nose normal.      Mouth/Throat:      Mouth: Mucous membranes are moist.      Pharynx: Oropharynx is clear.   Eyes:      General: No scleral icterus.     Extraocular Movements: Extraocular " movements intact.   Cardiovascular:      Rate and Rhythm: Normal rate and regular rhythm.   Pulmonary:      Effort: Pulmonary effort is normal.      Breath sounds: No wheezing or rales.   Abdominal:      General: Abdomen is flat. There is no distension.   Musculoskeletal:      Right lower leg: No edema.      Left lower leg: No edema.   Skin:     General: Skin is warm and dry.      Findings: No bruising.   Neurological:      Mental Status: He is alert and oriented to person, place, and time.      Motor: No weakness.   Psychiatric:         Mood and Affect: Mood normal.         Behavior: Behavior normal.         Thought Content: Thought content normal.         Judgment: Judgment normal.         Fluids:  In: -   Out: 290     LABS:  Recent Labs     03/08/24  0420 03/09/24  0150 03/10/24  0400   SODIUM 136 137 136   POTASSIUM 4.8 4.7 4.7   CHLORIDE 91* 92* 92*   CO2 24 24 23   GLUCOSE 77 84 96   BUN 72* 79* 83*   CREATININE 8.95* 10.13* 11.40*   CALCIUM 8.1* 7.9* 7.6*     Imaging: reviewed    IMPRESSION:  # NEREIDA, worsening              - baseline Crt around 1.37 per labs 2 weeks prior to admission  - Presenting Crt 7 at OSH, improved to 4.0 after arreola placement but has worsened again  - Likely cause of initial NEREIDA: Post renal obstruction, VS ATN (of note, patient was taking NSAIDs for flank pain), now likely with additional contrast injury  # CKD 3, NOS  #Atrophic /Echogenic Rt kidney               - Lasix renal scan 3/1/24: Normal renal flow and function scan of the left kidney.              chronic right hydroureteronephrosis at the distal ureteral level with resultant right renal cortical atrophy; s/p R nephrostomy  # Mild Hyperkalemia   # Severe Metabolic Acidosis, improved  # Metastatic Prostate Cancer to bones               - started on Firmagon a month ago   # Gross Hematuria, resolved   # Constipation  # Occipital CVA  # HTN  - goal <140/90  - not at goal        PLAN:  - No compelling indication for RRT today,  though anticipate need in next 24-48 hours if SCr doesn't plateau; patient/wife aware renal function may worsen, patient agreeable to dialysis if/when needed  -  continue BBL  - continue amlodipine, ok to adjust dose if needed  - continue torsemide 10mg qday  - Monitor urine output and Rt Nephrostomy output closely   - Daily labs   - Avoid Nephrotoxins, IV contrast  - Daily evaluation for RRT needs  - Dose all meds per eGFR < 15  - strict I/O  - he understands he is on a renal diet  - change binder to calcium acetate 1334mg TID with meals  - IS use encouraged      Other management per primary service

## 2024-03-10 NOTE — PROGRESS NOTES
Monitor summary: SR 77-92, MN .16, QRS .08, QT .40 with rare PVCs, PACs, and 10 beats of Vtach per strip from monitor room.

## 2024-03-10 NOTE — CARE PLAN
The patient is Watcher - Medium risk of patient condition declining or worsening    Shift Goals  Clinical Goals: monitor neuro status, ambulate safely  Patient Goals: sleep  Family Goals: updates    Progress made toward(s) clinical / shift goals:  Pt is A+Ox4. Pt is able to communicate his needs. Education provided on safety and using his call light for assistance. Pt able to ambulate to the bathroom as SBA. He remains free from falls. Bed is low and locked, call light within reach.    Problem: Neuro Status  Goal: Neuro status will remain stable or improve  Outcome: Progressing  Note: Q4hr neuro check in place to monitor neuro status. Neuro exams remain stable.      Problem: Optimal Care of the Stroke Patient  Goal: Optimal acute care for the stroke patient  Outcome: Progressing  Note: Core Measures In Place. Pt educated on NIHSS, medications safety, and possible d/c plan.  - Vital signs and neuro checks performed and documented per order  - NIHSS completed and documented per order  - Continuous telemetry monitoring for 72 hours or until discontinued by provider  - Head CT without contrast obtained  - Consideration of MRI/MRA  - MRI screening form completed in worklist if MRI ordered  - Echocardiogram with Bubble Study ordered/completed with consideration of TEJAS  - Carotid Doppler ordered/completed (Not required if CTA of neck completed in ED)  - Lipid Panel obtained within 48 hours of admission  - PT, PTT, INR obtained per Anticoagulation orders (if applicable)  - Antithrombotic therapy by end of hospital day 2 for ischemic stroke. Provider must document reason if contraindicated.  - Venous Thromboembolism (VTE) Prophylaxis by end of hospital day 2 for ischemic and hemorrhagic stroke. Provider must document reason if contraindicated  - Dysphagia screen completed and documented prior to any PO intake. Patient to remain NPO until Speech Therapy evaluation if thrombolytic or thrombectomy performed  - Rehabilitation  assessment including PT/OT/SLP evaluations for referral to Physical Medicine and Rehabilitation services. If none needed, provider needs to document reason  - Neurology consult placed  - Consideration of cardiology consult for cryptogenic strokes     Problem: Knowledge Deficit - Stroke Education  Goal: Patient's knowledge of stroke and risk factors will improve  Outcome: Progressing  Note: Educated pt on new diagnosis of stroke, risk factors, and healthy lifestyle changes. Pt demonstrates understanding education provided.    1.  Stroke education booklet provided  2.  Education regarding EMS activation, need for follow up, medication prescribed at discharge, risk factors for stroke/lifestyle modifications, warning signs and symptoms of stroke provided     Patient is not progressing towards the following goals:    Problem: Urinary Elimination  Goal: Establish and maintain regular urinary output  Outcome: Not Progressing  Note: Pt with R nephrostomy drain. Draining appropriately. Pt unable to void. Bladder scan as needed.

## 2024-03-10 NOTE — PROGRESS NOTES
[facility] Medicine Daily Progress Note    Date of Service  3/9/2024    Chief Complaint  Kaleb Yanes is a 73 y.o. male admitted 3/1/2024 with flank pain    Hospital Course  Kaleb Yanes is a 73 y.o. male who presented 3/1/2024 with acute renal failure due to ureteral obstruction.  The patient lives in [facility] had been having increased left-sided flank pain and back pain for which she has been taking ibuprofen this morning he woke up and had 3 episodes of hematuria and went to the emergency department where he was found to have acute renal failure and hyperkalemia 2 weeks ago his creatinine was 1.37 and on day of admission it was 7, potassium was 5.4.     Patient stated he initially attributed his flank pain and back pain to his bony metastases from his prostate cancer.  He would not have gone into the emergency room except for the hematuria, after the initial 3 episodes however his hematuria has now resolved, he denies any fever, chills, dysuria he thinks he has been making a normal amount of urine.  His appetite has been poor since beginning of the year but his family has been through a lot of stress and he thinks that his appetite is starting to improve he has not had any nausea or vomiting he does not describe anything that sounds like renal colic.    Interval Problem Update    MINNIE ON  SBP at goal <130.  He has been ambulating with his wife, though needs to compensate for Right vision loss by turning head.  Vision is slightly improved.  Mentation is slightly improved.  He has ongoing nausea which preceded his nephrostomy tube.  Initiated on TIDAC zofran to preempt nausea and assist with meals.  BMP reviewed, Cr increased to 10.1, BUN increased to 79.  Nephrology transitioned IV lasix to torsemide, reinstated renal diet.    I have discussed this patient's plan of care and discharge plan at IDT rounds today with Case Management, Nursing, Nursing leadership, and other members of the  IDT team.    Consultants/Specialty  Urology  Nephrology  IR  Neurology    Code Status  DNAR/DNI    Disposition  The patient is not medically cleared for discharge to home or a post-acute facility.  Anticipate discharge to: home with organized home healthcare and close outpatient follow-up    I have placed the appropriate orders for post-discharge needs.    Review of Systems  Review of Systems   Constitutional:  Negative for malaise/fatigue.   Gastrointestinal:  Positive for nausea. Negative for vomiting.   Genitourinary:  Negative for flank pain.   Musculoskeletal:  Negative for back pain, joint pain, myalgias and neck pain.   Neurological:  Negative for focal weakness and weakness.   Psychiatric/Behavioral:  Negative for depression. The patient is not nervous/anxious.         Physical Exam  Temp:  [36.1 °C (97 °F)-36.6 °C (97.8 °F)] 36.4 °C (97.6 °F)  Pulse:  [76-87] 84  Resp:  [17-18] 17  BP: (105-127)/(60-74) 121/72  SpO2:  [90 %-95 %] 94 %    Physical Exam  Vitals and nursing note reviewed. Exam conducted with a chaperone present (Wife at bedside).   Constitutional:       General: He is not in acute distress.     Appearance: Normal appearance. He is not ill-appearing, toxic-appearing or diaphoretic.   HENT:      Head: Normocephalic.      Nose: Nose normal.      Mouth/Throat:      Mouth: Mucous membranes are moist.   Eyes:      General: Visual field deficit (Symmetric RIght hemianopsia at greater than 60 degrees) present. No scleral icterus.     Conjunctiva/sclera: Conjunctivae normal.   Cardiovascular:      Rate and Rhythm: Normal rate and regular rhythm.      Pulses: Normal pulses.      Heart sounds: Normal heart sounds. No murmur heard.     No friction rub. No gallop.   Pulmonary:      Effort: Pulmonary effort is normal. No respiratory distress.      Breath sounds: Normal breath sounds. No wheezing, rhonchi or rales.   Abdominal:      General: Abdomen is flat. Bowel sounds are normal. There is no distension.       Palpations: Abdomen is soft.      Tenderness: There is no abdominal tenderness. There is no guarding or rebound.   Genitourinary:     Comments: Right nephrostomy with yellow clear urine  Musculoskeletal:      Cervical back: Neck supple.      Right lower leg: No edema.      Left lower leg: No edema.   Skin:     General: Skin is warm and dry.   Neurological:      Mental Status: He is alert.      Cranial Nerves: No dysarthria or facial asymmetry.      Motor: No weakness (5/5 BLE and BUE) or pronator drift.      Coordination: Finger-Nose-Finger Test abnormal (Improved, more pronounced across midline to Right and reaching to Right).      Comments: Consistently follows commands, appropriately conversant   Psychiatric:         Attention and Perception: Attention and perception normal.         Mood and Affect: Mood and affect normal.         Speech: Speech normal.         Behavior: Behavior normal. Behavior is cooperative.         Thought Content: Thought content normal.         Cognition and Memory: Cognition and memory normal.         Judgment: Judgment normal.         Fluids    Intake/Output Summary (Last 24 hours) at 3/9/2024 1617  Last data filed at 3/9/2024 1500  Gross per 24 hour   Intake --   Output 690 ml   Net -690 ml       Laboratory  Recent Labs     03/07/24  0047   WBC 10.4   RBC 4.06*   HEMOGLOBIN 12.0*   HEMATOCRIT 34.7*   MCV 85.5   MCH 29.6   MCHC 34.6   RDW 40.0   PLATELETCT 294   MPV 9.8     Recent Labs     03/07/24  0047 03/08/24  0420 03/09/24  0150   SODIUM 134* 136 137   POTASSIUM 4.3 4.8 4.7   CHLORIDE 92* 91* 92*   CO2 25 24 24   GLUCOSE 100* 77 84   BUN 63* 72* 79*   CREATININE 6.80* 8.95* 10.13*   CALCIUM 8.0* 8.1* 7.9*               Recent Labs     03/08/24  0420   TRIGLYCERIDE 99   HDL 33*   LDL 48       Imaging  EC-ECHOCARDIOGRAM COMPLETE W/O CONT   Final Result      MR-BRAIN-W/O   Final Result      1.  Bilateral occipital infarcts. Hemorrhagic transformation is noted in the right  occipital infarct.   2.  Abnormal T1 hypointensity of right side of C1 vertebral body and right occipital condyle. The review of CT scan of the neck demonstrates multifocal abnormal sclerotic areas. There is also destructive lesion in the right lateral condyle. These    findings are concerning for aggressive osseous process suggests metastasis. Further investigation is recommended.      CT-CTA NECK WITH & W/O-POST PROCESSING   Final Result   Addendum (preliminary) 1 of 1   Addendum:      There are scattered sclerotic osseous lesions seen within the thoracic    spine, manubrium and partially visualized ribs consistent with sclerotic    osseous metastatic disease.      Final      1.  Prominent proximal internal carotid artery atherosclerosis with less than 50% stenosis.   2.  Patent vertebral arteries.   3.  Layering probably moderate bilateral pleural effusions. Some probable bilateral pulmonary edema.      CT-CTA HEAD WITH & W/O-POST PROCESS   Final Result      1.  Acute/subacute appearing and chronic right occipital infarcts.   2.  No large vessel occlusion or aneurysm.      DX-CHEST-PORTABLE (1 VIEW)   Final Result      1.  Increased perihilar interstitial markings. Findings may represent interstitial edema or atypical infection.      2.  Mild bibasilar opacities likely represent atelectasis.      IR-PERQ NEPH CATH NEW ACCESS (ALL RADIOLOGY) RIGHT   Final Result         1. Ultrasound And Fluoroscopic Guided Placement Of a right sided 8 Bengali  Pigtail Locking Loop Percutaneous Nephrostomy Catheter.      2. Obstruction of the distal right ureter is demonstrated with no flow of contrast into the urinary bladder.      2. Nephrostogram Showing Satisfactory Catheter Position Without Extravasation.      NM-RENAL WITH DIURETIC WASHOUT   Final Result      1. Normal renal flow and function scan of the left kidney.   2. Decreased uptake and excretion of the radiotracer by the right kidney. Findings suggest chronic right  hydroureteronephrosis at the distal ureteral level with resultant right renal cortical atrophy.      OUTSIDE IMAGES-CT ABDOMEN /PELVIS   Final Result           Assessment/Plan  * Occipital stroke (HCC)- (present on admission)  Assessment & Plan  Developed abrupt symmetric Right hemianopsia 3/7/24 with dysmetria  CT demonstrates acute-on-chronic Right occipital CVA without LVO  Neurology consulted  LDL <50, returned to atorvastatin 20 mg  Telemetry  PT/OT/SLP recommend   MRI brain demonstrates bilateral occipital infarcts with Right hemorrhage  Holding ASA/Plavix due to Right occipital hemorrhage  TTE normal  A1c 5.8 consistent with prediabetes  BG goal <180  SBP goal <130, increased lasix and titrated labetalol    Nausea  Assessment & Plan  Reports intermittent nausea and anorexia  Scheduled zofran TIDAC  Unclear if this is symptomatic azotemia - if worsening will warrant RRT    Acute respiratory failure with hypoxia (HCC)  Assessment & Plan  Resolved with diuresis  Due to noncardiogenic pulmonary edema from NEREIDA  CXR increased interstitial edema  EKG not indicative of acute ischemic changes  Diuresis as tolerated    Constipation- (present on admission)  Assessment & Plan  Resolved  Continue bowel regimen    High anion gap metabolic acidosis- (present on admission)  Assessment & Plan  Due to NEREIDA  Resolved with iv bicarb  following    NEREIDA (acute kidney injury) (HCC)- (present on admission)  Assessment & Plan  Due to obstruction from obstructing stone and suspected intrinsic renal disease contributing  Plateau after recent NEREIDA suggestive of ATN  Worsened after CTA for occipital CVA due to contrast nephropathy  Nephrology consulted, no indication for RRT  Bicarb gtt discontinued due to noncardiogenic pulmonary edema  Avoid nephrotoxins  Repeat AM BMP    Hydronephrosis with urinary obstruction due to renal calculus- (present on admission)  Assessment & Plan  Vs stricture  See above  Johansen and nephrostomy R tube in  place  Urology s/o  IR following    Gross hematuria- (present on admission)  Assessment & Plan  Resolved  Due to percutaneous nephrostomy  Holding ASA for nephrostomy tube and due to hematuria    Hyperkalemia- (present on admission)  Assessment & Plan  Due to NEREIDA  Resolved  Bmp in am    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 In review of yesterday's note (3/8/2024), there are no changes except as documented above.

## 2024-03-10 NOTE — PROGRESS NOTES
[facility] Medicine Daily Progress Note    Date of Service  3/10/2024    Chief Complaint  Kaleb Yanes is a 73 y.o. male admitted 3/1/2024 with flank pain    Hospital Course  Kaleb Yanes is a 73 y.o. male who presented 3/1/2024 with acute renal failure due to ureteral obstruction.  The patient lives in [facility] had been having increased left-sided flank pain and back pain for which she has been taking ibuprofen this morning he woke up and had 3 episodes of hematuria and went to the emergency department where he was found to have acute renal failure and hyperkalemia 2 weeks ago his creatinine was 1.37 and on day of admission it was 7, potassium was 5.4.     Patient stated he initially attributed his flank pain and back pain to his bony metastases from his prostate cancer.  He would not have gone into the emergency room except for the hematuria, after the initial 3 episodes however his hematuria has now resolved, he denies any fever, chills, dysuria he thinks he has been making a normal amount of urine.  His appetite has been poor since beginning of the year but his family has been through a lot of stress and he thinks that his appetite is starting to improve he has not had any nausea or vomiting he does not describe anything that sounds like renal colic.    Interval Problem Update    MINNIE ON  SBP at goal ~130.  His wife noted improvement in his dysmetria.  Cognition and vision about the same, maybe a little better.  Nausea is much better. Was able to have dinner and breakfast.  Dr. Kelly insisted on renal diet. He was agreeable.  Having some urine from his bladder today.  Denies dysuria, hematuria.  Denies bowel dysfunction.  He and his wife want to get outside today for a mental break.  BMP reviewed, Cr increased to 11.4, BUN increased to 83.  POC discussed with nephrologist Dr. Kelly, she will evaluate today and likely will need RRT if renal function continues to worsen.  They are  hoping he can return home soon for recovery. Advised that we are still monitoring his renal function for need for RRT, which cannot be done at home due to need to rapidly coordinate HD.    I have discussed this patient's plan of care and discharge plan at IDT rounds today with Case Management, Nursing, Nursing leadership, and other members of the IDT team.    Consultants/Specialty  Urology  Nephrology  IR  Neurology    Code Status  DNAR/DNI    Disposition  The patient is not medically cleared for discharge to home or a post-acute facility.  Anticipate discharge to: home with close outpatient follow-up    I have placed the appropriate orders for post-discharge needs.    Review of Systems  Review of Systems   Constitutional:  Negative for malaise/fatigue.   Gastrointestinal:  Negative for constipation, diarrhea, nausea and vomiting.   Genitourinary:  Negative for dysuria and hematuria.   Musculoskeletal:  Negative for back pain, joint pain, myalgias and neck pain.   Neurological:  Negative for focal weakness and weakness.   Psychiatric/Behavioral:  Negative for depression. The patient is not nervous/anxious.         Physical Exam  Temp:  [35.9 °C (96.6 °F)-36.7 °C (98 °F)] 36.1 °C (97 °F)  Pulse:  [77-86] 86  Resp:  [16-18] 17  BP: (111-145)/(73-84) 122/73  SpO2:  [90 %-97 %] 95 %    Physical Exam  Vitals and nursing note reviewed. Exam conducted with a chaperone present (Wife at bedside).   Constitutional:       General: He is not in acute distress.     Appearance: Normal appearance. He is not ill-appearing, toxic-appearing or diaphoretic.   HENT:      Head: Normocephalic.      Nose: Nose normal.      Mouth/Throat:      Mouth: Mucous membranes are moist.   Eyes:      General: Visual field deficit (Symmetric RIght hemianopsia at greater than 60 degrees) present. No scleral icterus.     Conjunctiva/sclera: Conjunctivae normal.   Cardiovascular:      Rate and Rhythm: Normal rate and regular rhythm.      Pulses: Normal  pulses.      Heart sounds: Normal heart sounds. No murmur heard.     No friction rub. No gallop.   Pulmonary:      Effort: Pulmonary effort is normal. No respiratory distress.      Breath sounds: Normal breath sounds. No wheezing, rhonchi or rales.   Abdominal:      General: Abdomen is flat. Bowel sounds are normal. There is no distension.      Palpations: Abdomen is soft.      Tenderness: There is no abdominal tenderness. There is no guarding or rebound.   Genitourinary:     Comments: Right nephrostomy with yellow clear urine  Musculoskeletal:      Cervical back: Neck supple.      Right lower leg: No edema.      Left lower leg: No edema.   Skin:     General: Skin is warm and dry.      Comments: Right PNT site c/d/I, nontender   Neurological:      Mental Status: He is alert.      Cranial Nerves: No dysarthria or facial asymmetry.      Motor: No weakness (5/5 BLE and BUE) or pronator drift.      Coordination: Finger-Nose-Finger Test abnormal (Improved, more pronounced across midline to Right and reaching to Right).      Comments: Inconsistently follows commands, appropriately conversant   Psychiatric:         Attention and Perception: Attention and perception normal.         Mood and Affect: Mood and affect normal.         Speech: Speech normal.         Behavior: Behavior normal. Behavior is cooperative.         Thought Content: Thought content normal.         Cognition and Memory: Cognition and memory normal.         Judgment: Judgment normal.         Fluids    Intake/Output Summary (Last 24 hours) at 3/10/2024 1633  Last data filed at 3/10/2024 1600  Gross per 24 hour   Intake 780 ml   Output 1000 ml   Net -220 ml       Laboratory        Recent Labs     03/08/24  0420 03/09/24  0150 03/10/24  0400   SODIUM 136 137 136   POTASSIUM 4.8 4.7 4.7   CHLORIDE 91* 92* 92*   CO2 24 24 23   GLUCOSE 77 84 96   BUN 72* 79* 83*   CREATININE 8.95* 10.13* 11.40*   CALCIUM 8.1* 7.9* 7.6*               Recent Labs      03/08/24  0420   TRIGLYCERIDE 99   HDL 33*   LDL 48       Imaging  EC-ECHOCARDIOGRAM COMPLETE W/O CONT   Final Result      MR-BRAIN-W/O   Final Result      1.  Bilateral occipital infarcts. Hemorrhagic transformation is noted in the right occipital infarct.   2.  Abnormal T1 hypointensity of right side of C1 vertebral body and right occipital condyle. The review of CT scan of the neck demonstrates multifocal abnormal sclerotic areas. There is also destructive lesion in the right lateral condyle. These    findings are concerning for aggressive osseous process suggests metastasis. Further investigation is recommended.      CT-CTA NECK WITH & W/O-POST PROCESSING   Final Result   Addendum (preliminary) 1 of 1   Addendum:      There are scattered sclerotic osseous lesions seen within the thoracic    spine, manubrium and partially visualized ribs consistent with sclerotic    osseous metastatic disease.      Final      1.  Prominent proximal internal carotid artery atherosclerosis with less than 50% stenosis.   2.  Patent vertebral arteries.   3.  Layering probably moderate bilateral pleural effusions. Some probable bilateral pulmonary edema.      CT-CTA HEAD WITH & W/O-POST PROCESS   Final Result      1.  Acute/subacute appearing and chronic right occipital infarcts.   2.  No large vessel occlusion or aneurysm.      DX-CHEST-PORTABLE (1 VIEW)   Final Result      1.  Increased perihilar interstitial markings. Findings may represent interstitial edema or atypical infection.      2.  Mild bibasilar opacities likely represent atelectasis.      IR-PERQ NEPH CATH NEW ACCESS (ALL RADIOLOGY) RIGHT   Final Result         1. Ultrasound And Fluoroscopic Guided Placement Of a right sided 8 Citizen of [place]  Pigtail Locking Loop Percutaneous Nephrostomy Catheter.      2. Obstruction of the distal right ureter is demonstrated with no flow of contrast into the urinary bladder.      2. Nephrostogram Showing Satisfactory Catheter Position Without  Extravasation.      NM-RENAL WITH DIURETIC WASHOUT   Final Result      1. Normal renal flow and function scan of the left kidney.   2. Decreased uptake and excretion of the radiotracer by the right kidney. Findings suggest chronic right hydroureteronephrosis at the distal ureteral level with resultant right renal cortical atrophy.      OUTSIDE IMAGES-CT ABDOMEN /PELVIS   Final Result           Assessment/Plan  * Occipital stroke (HCC)- (present on admission)  Assessment & Plan  Developed abrupt symmetric Right hemianopsia 3/7/24 with dysmetria  CT demonstrates acute-on-chronic Right occipital CVA without LVO  Neurology consulted  LDL <50, returned to atorvastatin 20 mg  Telemetry  PT/OT/SLP recommend   MRI brain demonstrates bilateral occipital infarcts with Right hemorrhage  Holding ASA/Plavix due to Right occipital hemorrhage  TTE normal  A1c 5.8 consistent with prediabetes  BG goal <180  SBP goal <130, increased lasix and titrated labetalol    Hypertension secondary to other renal disorders- (present on admission)  Assessment & Plan  Due to hypervolemia from NEREIDA/ATN  Improved with IV diuresis  Initiated on torsemide and amlodipine per Nephrology  Goal SBP <130 due to CVA per Neurology    Nausea  Assessment & Plan  Reports intermittent nausea and anorexia  Improved with scheduled zofran TIDAC  Unclear if this is symptomatic azotemia - if worsening will warrant RRT    Acute respiratory failure with hypoxia (HCC)  Assessment & Plan  Resolved with diuresis  Due to noncardiogenic pulmonary edema from NEREIDA  CXR increased interstitial edema  EKG not indicative of acute ischemic changes  Diuresis as tolerated    Constipation- (present on admission)  Assessment & Plan  Resolved  Continue bowel regimen    High anion gap metabolic acidosis- (present on admission)  Assessment & Plan  Resolved with iv bicarb  Due to NEREIDA  Following BMP    NEREIDA (acute kidney injury) (HCC)- (present on admission)  Assessment & Plan  Due to  obstruction from obstructing stone and suspected intrinsic renal disease contributing  Plateau after recent NEREIDA suggestive of ATN  Worsened after CTA for occipital CVA due to contrast nephropathy  Nephrology consulted, no indication for RRT  Bicarb gtt discontinued due to noncardiogenic pulmonary edema  Avoid nephrotoxins  Repeat AM BMP    Hydronephrosis with urinary obstruction due to renal calculus- (present on admission)  Assessment & Plan  Vs stricture  See above  Johansen and nephrostomy R tube in place  Urology s/o  IR following    Gross hematuria- (present on admission)  Assessment & Plan  Resolved  Due to percutaneous nephrostomy  Holding ASA for nephrostomy tube and due to hematuria    Hyperkalemia- (present on admission)  Assessment & Plan  Resolved  Due to NEREIDA  Bmp in am    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 In review of yesterday's note (3/9/2024), there are no changes except as documented above.

## 2024-03-10 NOTE — PROGRESS NOTES
Pt had 10 beats of vtach per monitor room. Pt's VSS and currently showing NSR on monitor. On-call hospitalist notified. No orders at this time.

## 2024-03-10 NOTE — ASSESSMENT & PLAN NOTE
Due to hypervolemia from NEREIDA/ATN  Improved with IV diuresis  Initiated on torsemide and amlodipine per Nephrology  Goal SBP <130 due to CVA per Neurology

## 2024-03-10 NOTE — ASSESSMENT & PLAN NOTE
Reports intermittent nausea and anorexia  Improved with scheduled zofran TIDAC  Unclear if this is symptomatic azotemia - if worsening will warrant RRT    Likely due to uremia  Likely will require HD soon.   Npo at [place]     3/13/2024  Resolved

## 2024-03-11 ENCOUNTER — APPOINTMENT (OUTPATIENT)
Dept: RADIOLOGY | Facility: MEDICAL CENTER | Age: 74
DRG: 673 | End: 2024-03-11
Attending: HOSPITALIST
Payer: MEDICARE

## 2024-03-11 LAB
ALBUMIN SERPL BCP-MCNC: 3.2 G/DL (ref 3.2–4.9)
BUN SERPL-MCNC: 87 MG/DL (ref 8–22)
CALCIUM ALBUM COR SERPL-MCNC: 8.9 MG/DL (ref 8.5–10.5)
CALCIUM SERPL-MCNC: 8.3 MG/DL (ref 8.5–10.5)
CHLORIDE SERPL-SCNC: 93 MMOL/L (ref 96–112)
CO2 SERPL-SCNC: 23 MMOL/L (ref 20–33)
CREAT SERPL-MCNC: 11.88 MG/DL (ref 0.5–1.4)
GFR SERPLBLD CREATININE-BSD FMLA CKD-EPI: 4 ML/MIN/1.73 M 2
GLUCOSE SERPL-MCNC: 88 MG/DL (ref 65–99)
HAV IGM SERPL QL IA: NORMAL
HBV CORE IGM SER QL: NORMAL
HBV SURFACE AB SERPL IA-ACNC: <3.5 MIU/ML (ref 0–10)
HBV SURFACE AG SER QL: NORMAL
HCV AB SER QL: NORMAL
INR PPP: 1.16 (ref 0.87–1.13)
PHOSPHATE SERPL-MCNC: 6.4 MG/DL (ref 2.5–4.5)
POTASSIUM SERPL-SCNC: 5 MMOL/L (ref 3.6–5.5)
PROTHROMBIN TIME: 14.9 SEC (ref 12–14.6)
SODIUM SERPL-SCNC: 136 MMOL/L (ref 135–145)

## 2024-03-11 PROCEDURE — A9270 NON-COVERED ITEM OR SERVICE: HCPCS

## 2024-03-11 PROCEDURE — 97129 THER IVNTJ 1ST 15 MIN: CPT

## 2024-03-11 PROCEDURE — 92507 TX SP LANG VOICE COMM INDIV: CPT

## 2024-03-11 PROCEDURE — 80074 ACUTE HEPATITIS PANEL: CPT

## 2024-03-11 PROCEDURE — A9270 NON-COVERED ITEM OR SERVICE: HCPCS | Performed by: INTERNAL MEDICINE

## 2024-03-11 PROCEDURE — 700102 HCHG RX REV CODE 250 W/ 637 OVERRIDE(OP): Performed by: INTERNAL MEDICINE

## 2024-03-11 PROCEDURE — 700102 HCHG RX REV CODE 250 W/ 637 OVERRIDE(OP)

## 2024-03-11 PROCEDURE — 86706 HEP B SURFACE ANTIBODY: CPT

## 2024-03-11 PROCEDURE — 80069 RENAL FUNCTION PANEL: CPT

## 2024-03-11 PROCEDURE — A9270 NON-COVERED ITEM OR SERVICE: HCPCS | Performed by: STUDENT IN AN ORGANIZED HEALTH CARE EDUCATION/TRAINING PROGRAM

## 2024-03-11 PROCEDURE — 36415 COLL VENOUS BLD VENIPUNCTURE: CPT

## 2024-03-11 PROCEDURE — 700111 HCHG RX REV CODE 636 W/ 250 OVERRIDE (IP): Performed by: STUDENT IN AN ORGANIZED HEALTH CARE EDUCATION/TRAINING PROGRAM

## 2024-03-11 PROCEDURE — 76775 US EXAM ABDO BACK WALL LIM: CPT

## 2024-03-11 PROCEDURE — 700102 HCHG RX REV CODE 250 W/ 637 OVERRIDE(OP): Performed by: STUDENT IN AN ORGANIZED HEALTH CARE EDUCATION/TRAINING PROGRAM

## 2024-03-11 PROCEDURE — 99233 SBSQ HOSP IP/OBS HIGH 50: CPT | Mod: 25 | Performed by: HOSPITALIST

## 2024-03-11 PROCEDURE — 700101 HCHG RX REV CODE 250: Performed by: HOSPITALIST

## 2024-03-11 PROCEDURE — 85610 PROTHROMBIN TIME: CPT

## 2024-03-11 PROCEDURE — 770020 HCHG ROOM/CARE - TELE (206)

## 2024-03-11 PROCEDURE — 99418 PROLNG IP/OBS E/M EA 15 MIN: CPT | Performed by: HOSPITALIST

## 2024-03-11 RX ADMIN — Medication 1334 MG: at 10:10

## 2024-03-11 RX ADMIN — Medication 1334 MG: at 11:30

## 2024-03-11 RX ADMIN — ONDANSETRON 4 MG: 4 TABLET, ORALLY DISINTEGRATING ORAL at 18:09

## 2024-03-11 RX ADMIN — LABETALOL HYDROCHLORIDE 200 MG: 200 TABLET, FILM COATED ORAL at 15:27

## 2024-03-11 RX ADMIN — ATORVASTATIN CALCIUM 20 MG: 20 TABLET, FILM COATED ORAL at 18:10

## 2024-03-11 RX ADMIN — TORSEMIDE 10 MG: 5 TABLET ORAL at 04:24

## 2024-03-11 RX ADMIN — TAMSULOSIN HYDROCHLORIDE 0.4 MG: 0.4 CAPSULE ORAL at 04:24

## 2024-03-11 RX ADMIN — LABETALOL HYDROCHLORIDE 200 MG: 200 TABLET, FILM COATED ORAL at 20:44

## 2024-03-11 RX ADMIN — LABETALOL HYDROCHLORIDE 200 MG: 200 TABLET, FILM COATED ORAL at 10:10

## 2024-03-11 RX ADMIN — ONDANSETRON 4 MG: 4 TABLET, ORALLY DISINTEGRATING ORAL at 14:59

## 2024-03-11 RX ADMIN — DOCUSATE SODIUM 50 MG AND SENNOSIDES 8.6 MG 2 TABLET: 8.6; 5 TABLET, FILM COATED ORAL at 18:09

## 2024-03-11 RX ADMIN — AMLODIPINE BESYLATE 5 MG: 5 TABLET ORAL at 04:24

## 2024-03-11 RX ADMIN — ONDANSETRON 4 MG: 4 TABLET, ORALLY DISINTEGRATING ORAL at 10:10

## 2024-03-11 RX ADMIN — LIDOCAINE 1 PATCH: 4 PATCH TOPICAL at 04:24

## 2024-03-11 ASSESSMENT — ENCOUNTER SYMPTOMS
FOCAL WEAKNESS: 0
FLANK PAIN: 0
SHORTNESS OF BREATH: 0
HEADACHES: 0
NERVOUS/ANXIOUS: 0
ABDOMINAL PAIN: 0
VOMITING: 0
WEAKNESS: 0
SPEECH CHANGE: 0
CONSTIPATION: 0
HEARTBURN: 0
BLURRED VISION: 0
DEPRESSION: 0
COUGH: 0
MYALGIAS: 0
CHILLS: 0
NAUSEA: 0
BACK PAIN: 0
DIARRHEA: 0
NECK PAIN: 0
DIZZINESS: 0
FEVER: 0

## 2024-03-11 ASSESSMENT — FIBROSIS 4 INDEX: FIB4 SCORE: 2.13

## 2024-03-11 ASSESSMENT — PAIN DESCRIPTION - PAIN TYPE: TYPE: ACUTE PAIN

## 2024-03-11 NOTE — PROGRESS NOTES
[facility] Medicine Daily Progress Note    Date of Service  3/11/2024    Chief Complaint  Kaleb Yanes is a 73 y.o. male admitted 3/1/2024 with flank pain    Hospital Course  Kaleb Yanes is a 73 y.o. male who presented 3/1/2024 with acute renal failure due to ureteral obstruction.  The patient lives in [facility] had been having increased left-sided flank pain and back pain for which she has been taking ibuprofen this morning he woke up and had 3 episodes of hematuria and went to the emergency department where he was found to have acute renal failure and hyperkalemia 2 weeks ago his creatinine was 1.37 and on day of admission it was 7, potassium was 5.4.     Patient stated he initially attributed his flank pain and back pain to his bony metastases from his prostate cancer.  He would not have gone into the emergency room except for the hematuria, after the initial 3 episodes however his hematuria has now resolved, he denies any fever, chills, dysuria he thinks he has been making a normal amount of urine.  His appetite has been poor since beginning of the year but his family has been through a lot of stress and he thinks that his appetite is starting to improve he has not had any nausea or vomiting he does not describe anything that sounds like renal colic.    Interval Problem Update    MINNIE ON  SBP at goal ~130.  His wife noted improvement in his dysmetria.  Cognition and vision about the same, maybe a little better.  Nausea is much better. Was able to have dinner and breakfast.  Dr. Kelly insisted on renal diet. He was agreeable.  Having some urine from his bladder today.  Denies dysuria, hematuria.  Denies bowel dysfunction.  He and his wife want to get outside today for a mental break.  BMP reviewed, Cr increased to 11.4, BUN increased to 83.  POC discussed with nephrologist Dr. Kelly, she will evaluate today and likely will need RRT if renal function continues to worsen.  They are  hoping he can return home soon for recovery. Advised that we are still monitoring his renal function for need for RRT, which cannot be done at home due to need to rapidly coordinate HD.      3/11 patient is new to me today, patient is in bed, wife is at bedside, patient having clear light red urine in bag, Cr is not improving, discussed with nephrologist US renal ordered, discussed with patient's wife and patient, will repeat ct head on Wednesday. Continue monitoring, reviewed previous notes, imaging,labs available. Discussed with , charge nurse, bedside nurse.    Addendum: Discussed with nephrology, review ultrasound kidneys now showing left-sided hydronephrosis right kidney is stable, I have also called urology Dr. Mathias, at this time he is recommended to get nephrostomy tube placement in an urgent matter, I have called interventional radiologist to request an urgent nephrostomy tube placement, I have placed order for stat, all question answered. Spent extra 15 minutes    I have discussed this patient's plan of care and discharge plan at IDT rounds today with Case Management, Nursing, Nursing leadership, and other members of the IDT team.    Consultants/Specialty  Urology  Nephrology  IR  Neurology    Code Status  DNAR/DNI    Disposition  The patient is not medically cleared for discharge to home or a post-acute facility.      I have placed the appropriate orders for post-discharge needs.    Review of Systems  Review of Systems   Constitutional:  Negative for malaise/fatigue.   Gastrointestinal:  Negative for constipation, diarrhea, nausea and vomiting.   Genitourinary:  Negative for dysuria and hematuria.   Musculoskeletal:  Negative for back pain, joint pain, myalgias and neck pain.   Neurological:  Negative for focal weakness and weakness.   Psychiatric/Behavioral:  Negative for depression. The patient is not nervous/anxious.         Physical Exam  Temp:  [36.1 °C (97 °F)-36.8 °C (98.2 °F)] 36.5  °C (97.7 °F)  Pulse:  [74-89] 74  Resp:  [16-17] 16  BP: (122-132)/(73-78) 132/74  SpO2:  [91 %-95 %] 91 %    Physical Exam  Vitals and nursing note reviewed. Exam conducted with a chaperone present (Wife at bedside).   Constitutional:       Appearance: Normal appearance. He is not toxic-appearing or diaphoretic.   HENT:      Head: Normocephalic.      Nose: Nose normal.      Mouth/Throat:      Mouth: Mucous membranes are moist.   Eyes:      General: Visual field deficit (Symmetric RIght hemianopsia at greater than 60 degrees) present. No scleral icterus.  Cardiovascular:      Rate and Rhythm: Normal rate and regular rhythm.      Pulses: Normal pulses.      Heart sounds: Normal heart sounds.      No friction rub.   Pulmonary:      Effort: Pulmonary effort is normal. No respiratory distress.      Breath sounds: Normal breath sounds. No wheezing, rhonchi or rales.   Abdominal:      General: Abdomen is flat. Bowel sounds are normal. There is no distension.      Palpations: Abdomen is soft.      Tenderness: There is no abdominal tenderness.      Comments: Right nephrostomy tube, clear light red urine in bag.   Musculoskeletal:      Cervical back: Neck supple.      Right lower leg: No edema.      Left lower leg: No edema.   Skin:     General: Skin is warm and dry.      Comments: Right PNT site c/d/I, nontender   Neurological:      Mental Status: He is alert.      Cranial Nerves: No dysarthria or facial asymmetry.      Motor: No weakness (5/5 BLE and BUE) or pronator drift.      Coordination: Finger-Nose-Finger Test normal.   Psychiatric:         Attention and Perception: Attention and perception normal.         Mood and Affect: Mood and affect normal.         Speech: Speech normal.         Behavior: Behavior normal. Behavior is cooperative.         Thought Content: Thought content normal.         Cognition and Memory: Cognition and memory normal.         Judgment: Judgment normal.         Fluids    Intake/Output Summary  (Last 24 hours) at 3/11/2024 1351  Last data filed at 3/11/2024 1000  Gross per 24 hour   Intake 580 ml   Output 1000 ml   Net -420 ml       Laboratory        Recent Labs     03/09/24  0150 03/10/24  0400 03/11/24  0729   SODIUM 137 136 136   POTASSIUM 4.7 4.7 5.0   CHLORIDE 92* 92* 93*   CO2 24 23 23   GLUCOSE 84 96 88   BUN 79* 83* 87*   CREATININE 10.13* 11.40* 11.88*   CALCIUM 7.9* 7.6* 8.3*                       Imaging  EC-ECHOCARDIOGRAM COMPLETE W/O CONT   Final Result      MR-BRAIN-W/O   Final Result      1.  Bilateral occipital infarcts. Hemorrhagic transformation is noted in the right occipital infarct.   2.  Abnormal T1 hypointensity of right side of C1 vertebral body and right occipital condyle. The review of CT scan of the neck demonstrates multifocal abnormal sclerotic areas. There is also destructive lesion in the right lateral condyle. These    findings are concerning for aggressive osseous process suggests metastasis. Further investigation is recommended.      CT-CTA NECK WITH & W/O-POST PROCESSING   Final Result   Addendum (preliminary) 1 of 1   Addendum:      There are scattered sclerotic osseous lesions seen within the thoracic    spine, manubrium and partially visualized ribs consistent with sclerotic    osseous metastatic disease.      Final      1.  Prominent proximal internal carotid artery atherosclerosis with less than 50% stenosis.   2.  Patent vertebral arteries.   3.  Layering probably moderate bilateral pleural effusions. Some probable bilateral pulmonary edema.      CT-CTA HEAD WITH & W/O-POST PROCESS   Final Result      1.  Acute/subacute appearing and chronic right occipital infarcts.   2.  No large vessel occlusion or aneurysm.      DX-CHEST-PORTABLE (1 VIEW)   Final Result      1.  Increased perihilar interstitial markings. Findings may represent interstitial edema or atypical infection.      2.  Mild bibasilar opacities likely represent atelectasis.      IR-PERQ NEPH CATH NEW  ACCESS (ALL RADIOLOGY) RIGHT   Final Result         1. Ultrasound And Fluoroscopic Guided Placement Of a right sided 8 [place]  Pigtail Locking Loop Percutaneous Nephrostomy Catheter.      2. Obstruction of the distal right ureter is demonstrated with no flow of contrast into the urinary bladder.      2. Nephrostogram Showing Satisfactory Catheter Position Without Extravasation.      NM-RENAL WITH DIURETIC WASHOUT   Final Result      1. Normal renal flow and function scan of the left kidney.   2. Decreased uptake and excretion of the radiotracer by the right kidney. Findings suggest chronic right hydroureteronephrosis at the distal ureteral level with resultant right renal cortical atrophy.      OUTSIDE IMAGES-CT ABDOMEN /PELVIS   Final Result      US-RENAL    (Results Pending)        Assessment/Plan  * Occipital stroke (HCC)- (present on admission)  Assessment & Plan  Developed abrupt symmetric Right hemianopsia 3/7/24 with dysmetria  CT demonstrates acute-on-chronic Right occipital CVA without LVO  Neurology consulted  LDL <50, returned to atorvastatin 20 mg  Telemetry  PT/OT/SLP recommend   MRI brain demonstrates bilateral occipital infarcts with Right hemorrhage  Holding ASA/Plavix due to Right occipital hemorrhage  TTE normal  A1c 5.8 consistent with prediabetes  BG goal <180  SBP goal <130, increased lasix and titrated labetalol    Hypertension secondary to other renal disorders- (present on admission)  Assessment & Plan  Due to hypervolemia from NEREIDA/ATN  Improved with IV diuresis  Initiated on torsemide and amlodipine per Nephrology  Goal SBP <130 due to CVA per Neurology    Nausea  Assessment & Plan  Reports intermittent nausea and anorexia  Improved with scheduled zofran TIDAC  Unclear if this is symptomatic azotemia - if worsening will warrant RRT    Likely due to uremia  Likely will require HD soon.   Npo at [place]     Acute respiratory failure with hypoxia (HCC)  Assessment & Plan  Resolved with  diuresis  Due to noncardiogenic pulmonary edema from NEREIDA  CXR increased interstitial edema  EKG not indicative of acute ischemic changes  Diuresis as tolerated  On RA    Constipation- (present on admission)  Assessment & Plan  Resolved  Continue bowel regimen    High anion gap metabolic acidosis- (present on admission)  Assessment & Plan  Resolved with iv bicarb  Due to NEREIDA  Following BMP    NEREIDA (acute kidney injury) (HCC)- (present on admission)  Assessment & Plan  Due to obstruction from obstructing stone and suspected intrinsic renal disease contributing  Plateau after recent NEREIDA suggestive of ATN  Worsened after CTA for occipital CVA due to contrast nephropathy  Nephrology consulted, no indication for RRT  Bicarb gtt discontinued due to noncardiogenic pulmonary edema  Avoid nephrotoxins  Repeat AM BMP  Kidney function not improving  US renal ordered. Discussed with nephrologist Dr Carreno     Hydronephrosis with urinary obstruction due to renal calculus- (present on admission)  Assessment & Plan  Vs stricture  See above  Johansen and nephrostomy R tube in place  Urology s/o  IR following  Will repeat US renal today. F/u results    Gross hematuria- (present on admission)  Assessment & Plan    Due to percutaneous nephrostomy  Holding ASA for nephrostomy tube and due to hematuria  Improved.     Hyperkalemia- (present on admission)  Assessment & Plan  Resolved  Due to NEREIDA  Bmp reviewed, f/u bmp in am.     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 In review of yesterday's note (3/10/2024), there are no changes except as documented above.      Total time of 68 minutes spent prepping to see patient (e.g. reviewing   tests/imaging results, notes from consultants, bedside nurse, night shift ) obtaining and/or reviewing separately obtained history. Performing a medically appropriate examination and evaluation.  Counseling and educating the patient.  Ordering medications, tests, or procedures.  Referring and communicating with other health care professionals.  Documenting clinical information in EPIC.  Independently interpreting results and communicating results to patient.  Care coordination.

## 2024-03-11 NOTE — THERAPY
"Speech Language Pathology   Daily Treatment     Patient Name: Kaleb Yanes  AGE:  73 y.o., SEX:  male  Medical Record #: [id number]  Date of Service: 3/11/2024      Precautions: Fall Risk; R homonomous hemianopsia, word finding deficits         Subjective  Pt agreeable and cooperative with SLP tx tasks. Reports report of cog/lang evaluation; reports that deficits noted on evaluation \"are still hard.\"      Assessment  Pt seen for aphasia and visual neglect therapy.   Reading  Word level reading task completed with 100% accuracy (6/6 targets) with no cues and with mild to no hesitations. Paragraph level reading provided (5 sentences). Reading fluency with 75% accuracy - patient often skipping functor words and words on the far right of the page. Greater incidence of errors with lower-occurring words. Required multiple cues from SLP to keep track of place on the page; use of assistive technology to block other words/lines improved accuracy. Answered open-set reading comprehension questions with 50% accuracy, advancing to 75% with mod cues from SLP. Per patient - \"It's still pretty hard.\"    Visual Neglect  Pt able to complete visual cancellation task with 100% accuracy in the left-most 75% of the page. Redirection and max cues from SLP to complete task on right-most quarter of the page. Naming targets presented on the R side; patient continues to demonstrate improvements in accuracy when targets were moved closer to midline (improved accuracy in 2/6 missed targets).     Naming and Semantic Feature  Patient able to name common objects (presented on the R side) with 75% accuracy given intermittent repositioning closer to midline. Single paraphasia; improved with visual semantic cue. Intermittent hesitations during naming tasks and during conversation. Tasked patient with identifying semantic feature of target words, which he completed with 60% accuracy.       Clinical Impressions  Patient presents with a mild " "fluent aphasia, most consistent with anomic subtype per objective assessment. Service will continue to follow for language therapy targeting word retrieval, complex command following, visual neglect, reading comprehension, and for further assessment reading/writing.         Recommendations  Speech-Language Treatment: Present targets on R side    Supervision Needs Upons Discharge: Direct assistance with IADLs (see below)  IADLs: Medication management, Financial management, Appointment management, Household chores, Cooking      SLP Treatment Plan  Treatment Plan: Speech-Language Treatment, Patient/Family/Caregiver Training  SLP Frequency: 3x Per Week  Estimated Duration: Until Therapy Goals Met      Anticipated Discharge Needs  Discharge Recommendations: Recommend home health for continued speech therapy services  Therapy Recommendations Upon DC: Expression Training, Reading Training, Writing Training, Community Re-Integration, Patient / Family / Caregiver Education      Patient / Family Goals  Patient / Family Goal #1: \"We will do whatever it takes\" - pt's wife  Goal #1 Outcome: Progressing as expected  Short Term Goals  Short Term Goal # 1: Provided a visual field pt will identify items on the R-side with >90% accuracy given min cues  Goal Outcome # 1: Progressing as expected  Short Term Goal # 2: Utilizing semantic feature analysis and mod cues, pt will provide 3 attributes per item  Goal Outcome # 2 : Progressing as expected  Short Term Goal # 3: With mod cues, pt will follow complex directions with >85% accuracy  Goal Outcome  # 3:  (Goal not targeted this date)  Short Term Goal # 4: Pt will complete a reading/writing task with >85% accuracy given mod cues  Goal Outcome  # 4: Progressing as expected      Maricel Mesa, SLP  "

## 2024-03-11 NOTE — CARE PLAN
The patient is Stable - Low risk of patient condition declining or worsening    Shift Goals  Clinical Goals: Monitor neuro status  Patient Goals: rest  Family Goals: updates    Progress made toward(s) clinical / shift goals:    Problem: Knowledge Deficit - Standard  Goal: Patient and family/care givers will demonstrate understanding of plan of care, disease process/condition, diagnostic tests and medications  Outcome: Progressing     Problem: Urinary Elimination  Goal: Establish and maintain regular urinary output  Outcome: Progressing       Patient is not progressing towards the following goals:

## 2024-03-11 NOTE — PROGRESS NOTES
"Radiology Progress Note   Author: JUAREZ Dixon Date & Time created: 3/11/2024 12:25 PM   Date of admission  3/1/2024  Note to reader: this note follows the APSO format rather than the historical SOAP format. Assessment and plan located at the top of the note for ease of use.    Chief Complaint  73 y.o. male admitted 3/1/2024 with right sided back pain    HPI   Kaleb Yanes is a 74 yo male with PMH significant for prostate cancer, CAD s/p coronary artery stent placemen (8/2007), HTN, and HLD who presented to OSH with hematuria associated increasing left sided flank and back pain and was diagnosed with acute renal failure. Pt was transported to [facility] for higher level of care. 3/1/24 Lasix renal scan with diuretic washout revealed normal renal flow and function of the left kidney with decreased uptake and excretion by the right kidney. \"Findings suggest chronic right hydroureteronephrosis at the distal ureteral level with resultant right renal cortical atrophy\" (Avinash Crystal MD) 3/3/24 IR was consulted and Dr. Waite from IR performed a right nephrostogram with 8 fr nephrostomy tube placement on 03/04/24.     Interval History:   03/04/24- Pt  not in room undergoing right nephrostogram with right 8 fr nephrostomy tube placement  For maximal decompression of kidney  MAG3 shows decreased renal function.    03/05/24-right #8 [place] nephrostomy tube with 775 mL of pink urine, without clots, output in the last 24 hours.  Ordered placed for RNs to flush nephrostomy tube only for bloody urine or urine with clots.  Renal function continues to worsen despite nephrostomy tube placement  I reviewed the most recent labs: WBC 11.7; Hgb  12.3,  Cr 7.14  Coags INR 1.19,      03/06/24-right #8 [place] nephrostomy tube with 400 mL of pink urine, without clots, output in the last 24 hours.  Ordered placed for RNs to flush nephrostomy tube ONLY for bloody urine or urine with clots.Discussed drain care/management  " with pt and family at bedside.   I reviewed the most recent labs: WBC 11.0; Hgb  12.6,  Cr 6.79 ; Coags INR 1.19,     03/08/24-unfortunately yesterday a.m. patient's wife noted visual changes and patient . Neurology was consulted and he underwent a CT with contrast followed by MRI brain which confirmed bilateral occipital infarcts with some hemorrhagic transformation in the right.  He was transferred to the neurology floor. Right #8 [place] nephrostomy tube with 2000 mL of straw-colored urine, output in the last 24 hours.  Ordered placed for RNs to flush nephrostomy tube ONLY for bloody urine or urine with clots.Discussed drain care/management  with pt and patient wife at bedside. I reviewed the most recent labs: WBC 10.4; Hgb 12.0,  Cr 8.95; -increase in creatinine most likely reflective from contrast given with CT.    03/11/24 -right neph tube to right flank with 1100 mL output in the last 24 hours. Wife reports output was light red this morning but has resolved and is currently straw colored.  Labs reviewed; creatinine 11.88, GFR 4, no CBC today.  Nephrologist considering hemodialysis tomorrow if creatinine does not improve.      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)    Hyperkalemia    Gross hematuria    Hydronephrosis with urinary obstruction due to renal calculus    NEREIDA (acute kidney injury) (HCC)    High anion gap metabolic acidosis    Constipation    Acute respiratory failure with hypoxia (HCC)    Nausea    Hypertension secondary to other renal disorders      Plan IR  -  Irrigate right Nephrostomy tube with 10 ml of sterile saline q shift ONLY IF no output of bloody output  -Avoid all nephrotoxic agents  - Nephrology following  - Ultimate plan per urology note on 03/04 is to discharge with drain in place and fu with urology once kidney function improves.   - For long term use, Nephrostomy tube will  need to be exchanged every 3 months.  - Ok to shower with catheter as long as site is covered with waterproof dressing; change dressing if it becomes wet.  - No baths or submerging site under water until catheter removed   - IR will continue to follow nephrostomy tube while pt is in the hospital; we may not round every day     -Thank you for allowing Interventional Radiology team to participate in the patients care, if any additional care or requests are needed in the future please do not hesitate call or place IR order. [phone]           Review of Systems  Physical Exam   Review of Systems   Constitutional:  Positive for malaise/fatigue. Negative for chills and fever.   HENT:  Negative for hearing loss.    Eyes:  Negative for blurred vision.   Respiratory:  Negative for cough and shortness of breath.    Cardiovascular:  Negative for chest pain.   Gastrointestinal:  Negative for abdominal pain, heartburn, nausea and vomiting.   Genitourinary:  Negative for flank pain.   Neurological:  Negative for dizziness, speech change, focal weakness and headaches.      Vitals:    03/11/24 1149   BP: 132/74   Pulse: 74   Resp: 16   Temp: 36.5 °C (97.7 °F)   SpO2: 91%        Physical Exam  Vitals and nursing note reviewed.   Constitutional:       Appearance: Normal appearance.   Cardiovascular:      Rate and Rhythm: Normal rate.   Pulmonary:      Effort: Pulmonary effort is normal. No respiratory distress.   Abdominal:      General: Abdomen is flat.      Palpations: Abdomen is soft.   Genitourinary:     Comments: nephrostomy tube to Right flank  Skin:     Capillary Refill: Capillary refill takes less than 2 seconds.   Neurological:      Mental Status: He is alert and oriented to person, place, and time. Mental status is at baseline.      Motor: No weakness.      Comments:                Labs          Recent Labs     03/09/24  0150 03/10/24  0400 03/11/24  0729   SODIUM 137 136 136   POTASSIUM 4.7 4.7 5.0   CHLORIDE 92* 92* 93*    CO2 24 23 23   GLUCOSE 84 96 88   BUN 79* 83* 87*   CREATININE 10.13* 11.40* 11.88*   CALCIUM 7.9* 7.6* 8.3*     Recent Labs     03/09/24  0150 03/10/24  0400 03/11/24  0729   ALBUMIN 3.2 3.1* 3.2   CREATININE 10.13* 11.40* 11.88*     EC-ECHOCARDIOGRAM COMPLETE W/O CONT   Final Result      MR-BRAIN-W/O   Final Result      1.  Bilateral occipital infarcts. Hemorrhagic transformation is noted in the right occipital infarct.   2.  Abnormal T1 hypointensity of right side of C1 vertebral body and right occipital condyle. The review of CT scan of the neck demonstrates multifocal abnormal sclerotic areas. There is also destructive lesion in the right lateral condyle. These    findings are concerning for aggressive osseous process suggests metastasis. Further investigation is recommended.      CT-CTA NECK WITH & W/O-POST PROCESSING   Final Result   Addendum (preliminary) 1 of 1   Addendum:      There are scattered sclerotic osseous lesions seen within the thoracic    spine, manubrium and partially visualized ribs consistent with sclerotic    osseous metastatic disease.      Final      1.  Prominent proximal internal carotid artery atherosclerosis with less than 50% stenosis.   2.  Patent vertebral arteries.   3.  Layering probably moderate bilateral pleural effusions. Some probable bilateral pulmonary edema.      CT-CTA HEAD WITH & W/O-POST PROCESS   Final Result      1.  Acute/subacute appearing and chronic right occipital infarcts.   2.  No large vessel occlusion or aneurysm.      DX-CHEST-PORTABLE (1 VIEW)   Final Result      1.  Increased perihilar interstitial markings. Findings may represent interstitial edema or atypical infection.      2.  Mild bibasilar opacities likely represent atelectasis.      IR-PERQ NEPH CATH NEW ACCESS (ALL RADIOLOGY) RIGHT   Final Result         1. Ultrasound And Fluoroscopic Guided Placement Of a right sided 8 Swedish  Pigtail Locking Loop Percutaneous Nephrostomy Catheter.      2.  "Obstruction of the distal right ureter is demonstrated with no flow of contrast into the urinary bladder.      2. Nephrostogram Showing Satisfactory Catheter Position Without Extravasation.      NM-RENAL WITH DIURETIC WASHOUT   Final Result      1. Normal renal flow and function scan of the left kidney.   2. Decreased uptake and excretion of the radiotracer by the right kidney. Findings suggest chronic right hydroureteronephrosis at the distal ureteral level with resultant right renal cortical atrophy.      OUTSIDE IMAGES-CT ABDOMEN /PELVIS   Final Result      US-RENAL    (Results Pending)     INR   Date Value Ref Range Status   03/03/2024 1.19 (H) 0.87 - 1.13 Final     Comment:     INR - Non-therapeutic Reference Range: 0.87-1.13  INR - Therapeutic Reference Range: 2.0-4.0       No results found for: \"POCINR\"     Intake/Output Summary (Last 24 hours) at 3/4/2024 1535  Last data filed at 3/4/2024 0332  Gross per 24 hour   Intake 120 ml   Output 800 ml   Net -680 ml      Labs not explicitly included in this progress note were reviewed by the author. Radiology/imaging not explicitly included in this progress note was reviewed by the author.                    INR   Date Value Ref Range Status   03/02/2024 1.21 (H) 0.87 - 1.13 Final       Comment:       INR - Non-therapeutic Reference Range: 0.87-1.13  INR - Therapeutic Reference Range: 2.0-4.0         No results found for: \"POCINR\"      Intake/Output Summary (Last 24 hours) at 3/3/2024 1626  Last data filed at 3/3/2024 1200      Gross per 24 hour   Intake 28 ml   Output 1997.5 ml   Net -1969.5 ml       Labs not explicitly included in this progress note were reviewed by the author. Radiology/imaging not explicitly included in this progress note was reviewed by the author.      I have performed a physical exam and reviewed and updated ROS and Plan today (3/5/2024).      31 minutes in directly providing and coordinating care and extensive data review.  No time overlap " and excludes procedures.

## 2024-03-11 NOTE — CARE PLAN
The patient is Stable - Low risk of patient condition declining or worsening    Shift Goals  Clinical Goals: Renal function, Neuro checks  Patient Goals: Rest  Family Goals: updates    Progress made toward(s) clinical / shift goals:    Problem: Urinary Elimination  Goal: Establish and maintain regular urinary output  Outcome: Not Progressing     Problem: Knowledge Deficit - Standard  Goal: Patient and family/care givers will demonstrate understanding of plan of care, disease process/condition, diagnostic tests and medications  Outcome: Progressing     Problem: Mobility  Goal: Patient's capacity to carry out activities will improve  Outcome: Progressing     Problem: Self Care  Goal: Patient will have the ability to perform ADLs independently or with assistance (bathe, groom, dress, toilet and feed)  Outcome: Progressing     Problem: Pain - Standard  Goal: Alleviation of pain or a reduction in pain to the patient’s comfort goal  Outcome: Progressing     Problem: Mobility - Stroke  Goal: Patient's capacity to carry out activities will improve  Outcome: Progressing       Patient is not progressing towards the following goals:      Problem: Urinary Elimination  Goal: Establish and maintain regular urinary output  Outcome: Not Progressing

## 2024-03-11 NOTE — PROGRESS NOTES
Report received from AM RN at 1900. Pt sitting up in bed, AOX4. Denies pain. POC discussed with patient. Call light within reach. Bed alarm is ON.

## 2024-03-11 NOTE — PROGRESS NOTES
Monitor summary: SR 73-90, HI 0.17, QRS 0.06, QT 0.43, with rare-frequent PVCs and couplets per strip from monitor room.

## 2024-03-11 NOTE — PROGRESS NOTES
[facility] -  PROGRESS NOTE               Author: Giancarlo Carreno D.O. Date & Time: 3/11/2024  11:49 AM     HPI:  72 yo male from [facility] with PMH significant for metastatic prostate cancer -  on hormone blocking medication, due for next injection in March. , CAD s/p coronary artery stent placemen (8/2007), HTN, and HLD who presented to OSH with hematuria associated with worsening left sided flank and back pain x 2 days. At OSH he was diagnosed with acute renal failure with presenting Crt of 7 and was transported to [facility] for higher level of care on 3/1/24. Previous Crt level noted to be 0.92 in July 2022 and 2 weeks prior to admission his creatinine was 1.37 . On arrival he had a Lasix renal scan with diuretic which revealed normal renal flow and function of the left kidney with decreased uptake and excretion by the right kidney; and findings suggestive of chronic right hydroureteronephrosis at the distal ureteral level with resultant right renal cortical atrophy. Urology was consulted and recommended a Arreola catheter placement. His Crt improved to 4.92 and further to 4.0 before worsening again to 5.13 today. A Rt Nephrostomy tube was placed by CO today to see if this will help improve renal functions. His flank pain has since resolved. Hematuria has cleared up as well. No F/C/N/V/CP/SOB.  No melena, hematochezia, hematemesis.  No HA, visual changes, or abdominal pain. Nephrology consulted for management of NEREIDA and possible need for dialysis      DAILY NEPHROLOGY SUMMARY:  03/04 - consult done  03/05 - SCr trended up, arreola dislodged and not replaced due to PVR 0, SBP 140s-160s, UOP 1075mL, no freely voided urine recorded, UA with moderate occult blood, 0-2 hyaline casts, no protein, no new c/o, reports that he is finally voiding and urine via bladder is clearing  03/06 - SCr trending down, UOP 970mL recorded, SBP 140s-170s, no new c/o, has been walking laps, wife encouraging him to increase  "PO intake  03/07 - SCr stable, UOP 1600mL, SBP 130s-160s, has c/o SOB overnight, mild tachypnea noted, CXR with some increased interstitial markings, IVF stopped this AM, reports now SOB improved and good UOP via NPT, +constipation ongoing, no other new c/o  03/08 - SBP 130s-170s, developed abrupt right symmetric hemianopsia yesterday, stat CTA with acute/subacute and chronic R occipital infarcts, MRI brain w/o with bilateral occipital infarcts and hemorrhagic transformation on the R, both images demonstrate osseous lesions, patient reports he believes his visual deficits may be slightly improved from yesterday, UOP 2L, SCr trended up to 8.95, +fatigue, no other new c/o  03/09 - SBP 100s-140s, UOP 1.2L, Scr trending up, vision slightly improved, ongoing fatigue, no new c/o but wife reports poor urine output via bladder, that patient feels he needs to go but then doesn't produce urine  03/10 - SCr trended UOP, UOP 790mL, bladder scan <50cc, SBP 120s-140s, patient and wife believe his UOP improving, he is going to the [employer] garden today, notes some cough productive of phlegm, otherwise no new c/o  3/11: Cr continues to rise, but UOP 1100cc/day. Discussed potential need for dialysis. Wife at bedside and both pt and wife are very concerned.    REVIEW OF SYSTEMS:    10 point ROS reviewed and is as per HPI/daily summary or otherwise negative    PMH/PSH/SH/FH: Reviewed and unchanged since admission note  CURRENT MEDICATIONS: Reviewed from admission to present day    VS:  /76   Pulse 82   Temp 36.6 °C (97.9 °F) (Temporal)   Resp 16   Ht 1.702 m (5' 7\")   Wt 62.1 kg (136 lb 14.5 oz)   SpO2 95%   BMI 21.49 kg/m²   Physical Exam  Vitals and nursing note reviewed.   Constitutional:       General: He is not in acute distress.  HENT:      Head: Normocephalic and atraumatic.      Right Ear: External ear normal.      Left Ear: External ear normal.      Nose: Nose normal.      Mouth/Throat:      Mouth: Mucous " membranes are moist.      Pharynx: Oropharynx is clear.   Eyes:      General: No scleral icterus.     Extraocular Movements: Extraocular movements intact.   Cardiovascular:      Rate and Rhythm: Normal rate and regular rhythm.   Pulmonary:      Effort: Pulmonary effort is normal.      Breath sounds: No wheezing or rales.   Abdominal:      General: Abdomen is flat. There is no distension.   Musculoskeletal:      Right lower leg: No edema.      Left lower leg: No edema.   Skin:     General: Skin is warm and dry.      Findings: No bruising.   Neurological:      Mental Status: He is alert and oriented to person, place, and time.      Motor: No weakness.   Psychiatric:         Mood and Affect: Mood normal.         Behavior: Behavior normal.         Thought Content: Thought content normal.         Judgment: Judgment normal.         Fluids:  In: 580 [P.O.:580]  Out: 1100     LABS:  Recent Labs     03/09/24  0150 03/10/24  0400 03/11/24  0729   SODIUM 137 136 136   POTASSIUM 4.7 4.7 5.0   CHLORIDE 92* 92* 93*   CO2 24 23 23   GLUCOSE 84 96 88   BUN 79* 83* 87*   CREATININE 10.13* 11.40* 11.88*   CALCIUM 7.9* 7.6* 8.3*     Imaging: reviewed    IMPRESSION:  # NEREIDA, worsening              - baseline Crt around 1.37 per labs 2 weeks prior to admission  - Presenting Crt 7 at OSH, improved to 4.0 after arreola placement but has worsened again  - Likely cause of initial NEREIDA: Post renal obstruction, vs ATN (NSAIDS)  - Now likely with additional contrast injury  # CKD 3, NOS  #Atrophic /Echogenic Rt kidney               - Lasix renal scan 3/1/24: Normal renal flow and function scan of the left kidney.              chronic right hydroureteronephrosis at the distal ureteral level with resultant right renal cortical atrophy;   - s/p R nephrostomy  # Mild Hyperkalemia   # Severe Metabolic Acidosis, improved  # Metastatic Prostate Cancer to bones               - started on Firmagon a month ago   # Gross Hematuria, resolved   #  Constipation  # Occipital CVA  # HTN  - goal <140/90  - not at goal        PLAN:    -Cr continues to rise, but UOP improved  - no urgent indication for HD today, but discussed  - impending need for dialysis tomorrow if numbers continue to rise   - please keep NPO after midnight, and arrange for temporary HD cathjeter  - would also check bladder scans or a renal US to ensure no new L sided obstruction is the cause of his NEREIDA  - continue BBL  - continue amlodipine, ok to adjust dose if needed  - continue torsemide 10mg qday  - Monitor urine output and Rt Nephrostomy output closely   - Daily labs   - Avoid Nephrotoxins, IV contrast  - Dose all meds per eGFR < 15  - strict I/O  - he understands he is on a renal diet  - cont calcium acetate 1334mg TID with meals    Dispo: eval for any new retention/obstruction. Otherwise, please keep NPO past midhgnit for dialysis tomorrow if numbers continue to worsen    Above recommendations discussed with the primary team    Please page nephrology with any questions or concerns

## 2024-03-11 NOTE — DOCUMENTATION QUERY
[facility]                                                                       Query Response Note      PATIENT:               MASON JIMÉNEZ  ACCT #:                  [id number]  MRN:                     [id number]  :                      1950  ADMIT DATE:       3/1/2024 8:18 AM  DISCH DATE:          RESPONDING  PROVIDER #:        [id number]           QUERY TEXT:    Noncardiogenic pulmonary edema is documented in the Medical Record.  Can the acuity of pulmonary edema be further specified?        The patient's Clinical Indicators include:  Findings: noncardiogenic pulmonary edema from NEREIDA CXR increased interstitial edema    Chest xray 3/6: Increased perihilar interstitial markings. Findings may represent interstitial edema or atypical infection. Mild bibasilar opacities likely represent atelectasis.    Treatment: imaging, diuresis     Risk Factors: acute kidney injury    Casandra Núñez RN BSN  Clinical Documentation   [email or web address]  Connect via [email or web address] Messenger  Options provided:   -- Acute pulmonary edema- non cardiogenic   -- Chronic pulmonary edema - non cardiogenic   -- Other explanation, (please specify other explanation)      Query created by: Casandra Mercedes on 3/11/2024 2:53 AM    RESPONSE TEXT:    Acute pulmonary edema- non cardiogenic          Electronically signed by:  TORRI PALACIOS MD 3/11/2024 7:08 AM

## 2024-03-11 NOTE — PROGRESS NOTES
Monitor summary: SR 78-83, VT -0.14, QRS -0.06, QT -0.35, with rare PACs and PVCs per strip from the monitor room.

## 2024-03-12 ENCOUNTER — APPOINTMENT (OUTPATIENT)
Dept: RADIOLOGY | Facility: MEDICAL CENTER | Age: 74
DRG: 673 | End: 2024-03-12
Attending: HOSPITALIST
Payer: MEDICARE

## 2024-03-12 ENCOUNTER — APPOINTMENT (OUTPATIENT)
Dept: RADIOLOGY | Facility: MEDICAL CENTER | Age: 74
DRG: 673 | End: 2024-03-12
Attending: SURGERY
Payer: MEDICARE

## 2024-03-12 LAB
ALBUMIN SERPL BCP-MCNC: 3.2 G/DL (ref 3.2–4.9)
BUN SERPL-MCNC: 93 MG/DL (ref 8–22)
CALCIUM ALBUM COR SERPL-MCNC: 9.1 MG/DL (ref 8.5–10.5)
CALCIUM SERPL-MCNC: 8.5 MG/DL (ref 8.5–10.5)
CHLORIDE SERPL-SCNC: 92 MMOL/L (ref 96–112)
CO2 SERPL-SCNC: 20 MMOL/L (ref 20–33)
CREAT SERPL-MCNC: 12.52 MG/DL (ref 0.5–1.4)
ERYTHROCYTE [DISTWIDTH] IN BLOOD BY AUTOMATED COUNT: 40.7 FL (ref 35.9–50)
GFR SERPLBLD CREATININE-BSD FMLA CKD-EPI: 4 ML/MIN/1.73 M 2
GLUCOSE SERPL-MCNC: 75 MG/DL (ref 65–99)
GRAM STN SPEC: NORMAL
HCT VFR BLD AUTO: 36.5 % (ref 42–52)
HGB BLD-MCNC: 12.5 G/DL (ref 14–18)
MCH RBC QN AUTO: 29.7 PG (ref 27–33)
MCHC RBC AUTO-ENTMCNC: 34.2 G/DL (ref 32.3–36.5)
MCV RBC AUTO: 86.7 FL (ref 81.4–97.8)
PHOSPHATE SERPL-MCNC: 6.6 MG/DL (ref 2.5–4.5)
PLATELET # BLD AUTO: 315 K/UL (ref 164–446)
PMV BLD AUTO: 9.7 FL (ref 9–12.9)
POTASSIUM SERPL-SCNC: 5.3 MMOL/L (ref 3.6–5.5)
PSA SERPL-MCNC: 73.4 NG/ML (ref 0–4)
RBC # BLD AUTO: 4.21 M/UL (ref 4.7–6.1)
SIGNIFICANT IND 70042: NORMAL
SITE SITE: NORMAL
SODIUM SERPL-SCNC: 135 MMOL/L (ref 135–145)
SOURCE SOURCE: NORMAL
TESTOST SERPL-MCNC: 13 NG/DL (ref 175–781)
WBC # BLD AUTO: 9.8 K/UL (ref 4.8–10.8)

## 2024-03-12 PROCEDURE — 700102 HCHG RX REV CODE 250 W/ 637 OVERRIDE(OP): Performed by: INTERNAL MEDICINE

## 2024-03-12 PROCEDURE — 36415 COLL VENOUS BLD VENIPUNCTURE: CPT

## 2024-03-12 PROCEDURE — 02HV33Z INSERTION OF INFUSION DEVICE INTO SUPERIOR VENA CAVA, PERCUTANEOUS APPROACH: ICD-10-PCS | Performed by: SURGERY

## 2024-03-12 PROCEDURE — 700111 HCHG RX REV CODE 636 W/ 250 OVERRIDE (IP): Mod: JZ

## 2024-03-12 PROCEDURE — A9270 NON-COVERED ITEM OR SERVICE: HCPCS | Performed by: STUDENT IN AN ORGANIZED HEALTH CARE EDUCATION/TRAINING PROGRAM

## 2024-03-12 PROCEDURE — 700117 HCHG RX CONTRAST REV CODE 255: Performed by: HOSPITALIST

## 2024-03-12 PROCEDURE — 700101 HCHG RX REV CODE 250

## 2024-03-12 PROCEDURE — B548ZZA ULTRASONOGRAPHY OF SUPERIOR VENA CAVA, GUIDANCE: ICD-10-PCS | Performed by: SURGERY

## 2024-03-12 PROCEDURE — 770020 HCHG ROOM/CARE - TELE (206)

## 2024-03-12 PROCEDURE — 90935 HEMODIALYSIS ONE EVALUATION: CPT

## 2024-03-12 PROCEDURE — A9270 NON-COVERED ITEM OR SERVICE: HCPCS | Performed by: INTERNAL MEDICINE

## 2024-03-12 PROCEDURE — 84153 ASSAY OF PSA TOTAL: CPT

## 2024-03-12 PROCEDURE — C1729 CATH, DRAINAGE: HCPCS

## 2024-03-12 PROCEDURE — 700111 HCHG RX REV CODE 636 W/ 250 OVERRIDE (IP): Performed by: STUDENT IN AN ORGANIZED HEALTH CARE EDUCATION/TRAINING PROGRAM

## 2024-03-12 PROCEDURE — 80069 RENAL FUNCTION PANEL: CPT

## 2024-03-12 PROCEDURE — 0T9430Z DRAINAGE OF LEFT KIDNEY PELVIS WITH DRAINAGE DEVICE, PERCUTANEOUS APPROACH: ICD-10-PCS | Performed by: RADIOLOGY

## 2024-03-12 PROCEDURE — 36556 INSERT NON-TUNNEL CV CATH: CPT | Mod: RT | Performed by: SURGERY

## 2024-03-12 PROCEDURE — 84403 ASSAY OF TOTAL TESTOSTERONE: CPT

## 2024-03-12 PROCEDURE — 700102 HCHG RX REV CODE 250 W/ 637 OVERRIDE(OP): Performed by: STUDENT IN AN ORGANIZED HEALTH CARE EDUCATION/TRAINING PROGRAM

## 2024-03-12 PROCEDURE — C1752 CATH,HEMODIALYSIS,SHORT-TERM: HCPCS

## 2024-03-12 PROCEDURE — 71045 X-RAY EXAM CHEST 1 VIEW: CPT

## 2024-03-12 PROCEDURE — 99221 1ST HOSP IP/OBS SF/LOW 40: CPT | Mod: 25 | Performed by: SURGERY

## 2024-03-12 PROCEDURE — 87205 SMEAR GRAM STAIN: CPT

## 2024-03-12 PROCEDURE — 85027 COMPLETE CBC AUTOMATED: CPT

## 2024-03-12 PROCEDURE — 0JH63XZ INSERTION OF TUNNELED VASCULAR ACCESS DEVICE INTO CHEST SUBCUTANEOUS TISSUE AND FASCIA, PERCUTANEOUS APPROACH: ICD-10-PCS | Performed by: SURGERY

## 2024-03-12 PROCEDURE — 97530 THERAPEUTIC ACTIVITIES: CPT

## 2024-03-12 PROCEDURE — 700111 HCHG RX REV CODE 636 W/ 250 OVERRIDE (IP): Performed by: INTERNAL MEDICINE

## 2024-03-12 PROCEDURE — 700111 HCHG RX REV CODE 636 W/ 250 OVERRIDE (IP): Mod: JZ | Performed by: RADIOLOGY

## 2024-03-12 PROCEDURE — 97116 GAIT TRAINING THERAPY: CPT

## 2024-03-12 PROCEDURE — 87086 URINE CULTURE/COLONY COUNT: CPT

## 2024-03-12 PROCEDURE — 99233 SBSQ HOSP IP/OBS HIGH 50: CPT | Performed by: HOSPITALIST

## 2024-03-12 PROCEDURE — 700101 HCHG RX REV CODE 250: Performed by: HOSPITALIST

## 2024-03-12 RX ORDER — HEPARIN SODIUM 1000 [USP'U]/ML
INJECTION, SOLUTION INTRAVENOUS; SUBCUTANEOUS
Status: COMPLETED
Start: 2024-03-12 | End: 2024-03-12

## 2024-03-12 RX ORDER — MIDAZOLAM HYDROCHLORIDE 1 MG/ML
.5-2 INJECTION INTRAMUSCULAR; INTRAVENOUS PRN
Status: ACTIVE | OUTPATIENT
Start: 2024-03-12 | End: 2024-03-12

## 2024-03-12 RX ORDER — LIDOCAINE HYDROCHLORIDE 10 MG/ML
INJECTION, SOLUTION INFILTRATION; PERINEURAL
Status: COMPLETED
Start: 2024-03-12 | End: 2024-03-12

## 2024-03-12 RX ORDER — SODIUM CHLORIDE 9 MG/ML
500 INJECTION, SOLUTION INTRAVENOUS
Status: ACTIVE | OUTPATIENT
Start: 2024-03-12 | End: 2024-03-12

## 2024-03-12 RX ORDER — HEPARIN SODIUM 1000 [USP'U]/ML
2200 INJECTION, SOLUTION INTRAVENOUS; SUBCUTANEOUS
Status: DISCONTINUED | OUTPATIENT
Start: 2024-03-12 | End: 2024-03-14 | Stop reason: HOSPADM

## 2024-03-12 RX ORDER — HEPARIN SODIUM 1000 [USP'U]/ML
500 INJECTION, SOLUTION INTRAVENOUS; SUBCUTANEOUS
Status: COMPLETED | OUTPATIENT
Start: 2024-03-12 | End: 2024-03-12

## 2024-03-12 RX ORDER — ONDANSETRON 2 MG/ML
4 INJECTION INTRAMUSCULAR; INTRAVENOUS PRN
Status: ACTIVE | OUTPATIENT
Start: 2024-03-12 | End: 2024-03-12

## 2024-03-12 RX ORDER — MIDAZOLAM HYDROCHLORIDE 1 MG/ML
INJECTION INTRAMUSCULAR; INTRAVENOUS
Status: COMPLETED
Start: 2024-03-12 | End: 2024-03-12

## 2024-03-12 RX ADMIN — MIDAZOLAM HYDROCHLORIDE 2 MG: 1 INJECTION, SOLUTION INTRAMUSCULAR; INTRAVENOUS at 10:40

## 2024-03-12 RX ADMIN — HEPARIN SODIUM 500 UNITS: 1000 INJECTION, SOLUTION INTRAVENOUS; SUBCUTANEOUS at 15:50

## 2024-03-12 RX ADMIN — LIDOCAINE 1 PATCH: 4 PATCH TOPICAL at 12:02

## 2024-03-12 RX ADMIN — TORSEMIDE 10 MG: 5 TABLET ORAL at 12:09

## 2024-03-12 RX ADMIN — FENTANYL CITRATE 50 MCG: 50 INJECTION, SOLUTION INTRAMUSCULAR; INTRAVENOUS at 10:42

## 2024-03-12 RX ADMIN — TAMSULOSIN HYDROCHLORIDE 0.4 MG: 0.4 CAPSULE ORAL at 04:41

## 2024-03-12 RX ADMIN — ONDANSETRON 4 MG: 4 TABLET, ORALLY DISINTEGRATING ORAL at 09:20

## 2024-03-12 RX ADMIN — AMLODIPINE BESYLATE 5 MG: 5 TABLET ORAL at 09:20

## 2024-03-12 RX ADMIN — FENTANYL CITRATE 50 MCG: 50 INJECTION, SOLUTION INTRAMUSCULAR; INTRAVENOUS at 10:47

## 2024-03-12 RX ADMIN — HEPARIN SODIUM 2200 UNITS: 1000 INJECTION, SOLUTION INTRAVENOUS; SUBCUTANEOUS at 17:52

## 2024-03-12 RX ADMIN — ATORVASTATIN CALCIUM 20 MG: 20 TABLET, FILM COATED ORAL at 18:26

## 2024-03-12 RX ADMIN — ONDANSETRON 4 MG: 4 TABLET, ORALLY DISINTEGRATING ORAL at 12:00

## 2024-03-12 RX ADMIN — IOHEXOL 20 ML: 300 INJECTION, SOLUTION INTRAVENOUS at 11:30

## 2024-03-12 RX ADMIN — LIDOCAINE HYDROCHLORIDE: 10 INJECTION, SOLUTION INFILTRATION; PERINEURAL at 14:00

## 2024-03-12 RX ADMIN — FENTANYL CITRATE 50 MCG: 50 INJECTION, SOLUTION INTRAMUSCULAR; INTRAVENOUS at 10:46

## 2024-03-12 RX ADMIN — Medication 1334 MG: at 18:26

## 2024-03-12 RX ADMIN — LABETALOL HYDROCHLORIDE 200 MG: 200 TABLET, FILM COATED ORAL at 09:19

## 2024-03-12 RX ADMIN — MIDAZOLAM HYDROCHLORIDE 2 MG: 1 INJECTION INTRAMUSCULAR; INTRAVENOUS at 10:40

## 2024-03-12 RX ADMIN — Medication 1334 MG: at 12:00

## 2024-03-12 RX ADMIN — ONDANSETRON 4 MG: 4 TABLET, ORALLY DISINTEGRATING ORAL at 18:26

## 2024-03-12 RX ADMIN — LABETALOL HYDROCHLORIDE 200 MG: 200 TABLET, FILM COATED ORAL at 18:26

## 2024-03-12 ASSESSMENT — GAIT ASSESSMENTS
DISTANCE (FEET): 40
GAIT LEVEL OF ASSIST: STANDBY ASSIST
DEVIATION: BRADYKINETIC;OTHER (COMMENT)

## 2024-03-12 ASSESSMENT — COGNITIVE AND FUNCTIONAL STATUS - GENERAL
MOBILITY SCORE: 19
WALKING IN HOSPITAL ROOM: A LITTLE
CLIMB 3 TO 5 STEPS WITH RAILING: A LITTLE
MOVING TO AND FROM BED TO CHAIR: A LITTLE
STANDING UP FROM CHAIR USING ARMS: A LITTLE
SUGGESTED CMS G CODE MODIFIER MOBILITY: CK
MOVING FROM LYING ON BACK TO SITTING ON SIDE OF FLAT BED: A LITTLE

## 2024-03-12 ASSESSMENT — ENCOUNTER SYMPTOMS
DEPRESSION: 0
MYALGIAS: 0
DIARRHEA: 0
CONSTIPATION: 0
FOCAL WEAKNESS: 0
VOMITING: 0
NERVOUS/ANXIOUS: 0
NECK PAIN: 0
NAUSEA: 0
WEAKNESS: 0
BACK PAIN: 0

## 2024-03-12 ASSESSMENT — PAIN DESCRIPTION - PAIN TYPE
TYPE: ACUTE PAIN

## 2024-03-12 ASSESSMENT — FIBROSIS 4 INDEX: FIB4 SCORE: 1.99

## 2024-03-12 NOTE — CONSULTS
VASCULAR SURGERY SERVICE  CONSULT NOTE      Date: 3/12/2024    Referring Provider: Jim Conde M.d.    Consulting Physician: Jean Sultana MD    -------------------------------------------------------------------------------------------------    Reason for consultation: dialysis catheter placement    HPI: This is a 73 y.o. male with renal failure in need of HD access. Temporary non-tunneled catheter placement has been requested at this time. When I came to evaluate the patient he was in no acute distress.     Past Medical History:   Diagnosis Date    CAD (coronary artery disease)     Hyperlipidemia     Hypertension        Past Surgical History:   Procedure Laterality Date    ZZZ CARDIAC CATH  8/16/07    BMS to Circ    OTHER CARDIAC SURGERY      stent x 1 aug 07       Current Facility-Administered Medications   Medication Dose Route Frequency Provider Last Rate Last Admin    LIDOCAINE HCL 1 % INJ SOLN             calcium acetate (Phos-Lo) 667 MG tablet 1,334 mg  1,334 mg Oral TID WITH MEALS Kemi Kelly, D.OIvonne   1,334 mg at 03/12/24 1200    ondansetron (Zofran ODT) dispertab 4 mg  4 mg Oral TID AC David Evans M.D.   4 mg at 03/12/24 1200    atorvastatin (Lipitor) tablet 20 mg  20 mg Oral Q EVENING David Evans M.D.   20 mg at 03/11/24 1810    amLODIPine (Norvasc) tablet 5 mg  5 mg Oral Q DAY SANDRITA HollyOIvonne   5 mg at 03/12/24 0920    labetalol (Normodyne) tablet 200 mg  200 mg Oral TID David Evans M.D.   200 mg at 03/12/24 0919    acetaminophen (Tylenol) tablet 1,000 mg  1,000 mg Oral TID PRN David Evans M.D.        bisacodyl (Dulcolax) suppository 10 mg  10 mg Rectal QDAY PRN Valerie Walls M.D.   10 mg at 03/10/24 0932    normal saline flush 0.9 % SOLN 5-10 mL  5-10 mL Intravenous Q12H PRN (RT) Valerie Swift A.P.R.N.   10 mL at 03/05/24 2022    lidocaine jelly (Uro-Jet) 2 %   Topical Once PRN Caroline Padilla P.A.-C.        senna-docusate (Pericolace Or Senokot S) 8.6-50 MG per  tablet 2 Tablet  2 Tablet Oral Q EVENING KOLE Galloway-CIvonne   2 Tablet at 24 1809    And    polyethylene glycol/lytes (Miralax) Packet 1 Packet  1 Packet Oral QDAY PRN Fernando Blanchard P.A.-C.   1 Packet at 24 1808    lidocaine (Asperflex) 4 % patch 1 Patch  1 Patch Transdermal DAILY Valerie Walls M.D.   1 Patch at 24 1202    morphine 4 MG/ML injection 2 mg  2 mg Intravenous Q4HRS PRN Valerie Walls M.D.        oxyCODONE immediate-release (Roxicodone) tablet 5 mg  5 mg Oral Q4HRS PRN Valerie Walls M.D.   5 mg at 24 1604    tamsulosin (Flomax) capsule 0.4 mg  0.4 mg Oral DAILY Aurea Grace M.D.   0.4 mg at 24 0441    Icy Hot Balm Extra Strength 7.6-29 % topical ointment   Topical PRN ILSA Galloway.-CIvonne           Social History     Socioeconomic History    Marital status:      Spouse name: Not on file    Number of children: Not on file    Years of education: Not on file    Highest education level: Not on file   Occupational History    Not on file   Tobacco Use    Smoking status: Former     Current packs/day: 0.00     Types: Cigarettes     Start date: 1977     Quit date: 2007     Years since quittin.5    Smokeless tobacco: Never    Tobacco comments:     quit aug 15 07   Substance and Sexual Activity    Alcohol use: Yes     Comment: occasional    Drug use: Yes     Types: Marijuana, Oral     Comment: eddiles THC for sleep    Sexual activity: Not on file   Other Topics Concern    Not on file   Social History Narrative    Not on file     Social Determinants of Health     Financial Resource Strain: Not on file   Food Insecurity: Not on file   Transportation Needs: Not on file   Physical Activity: Not on file   Stress: Not on file   Social Connections: Not on file   Intimate Partner Violence: Not on file   Housing Stability: Not on file       Family History   Problem Relation Age of Onset    Heart Failure Neg Hx     Heart Disease Neg Hx   "      Allergies:  Nkda [no known drug allergy]    Review of Systems:  Noncontributory except as per HPI    Physical Exam:  /69   Pulse 65   Temp 36.1 °C (97 °F) (Temporal)   Resp 16   Ht 1.702 m (5' 7\")   Wt 62.5 kg (137 lb 12.6 oz)   SpO2 93%     Constitutional: Alert, oriented, no acute distress  HEENT:  Normocephalic and atraumatic, EOMI  Neck:   Supple, no JVD,   Cardiovascular: Regular rate and rhythm,   Pulmonary:  Good air entry bilaterally,   Abdominal:  Soft, non-tender, non-distended  Musculoskeletal: No edema, no tenderness  Neurological:  CN II-XII grossly intact, no focal deficits  Skin:   Skin is warm and dry. No rash noted.    Labs:  Recent Labs     03/12/24  0228   WBC 9.8   RBC 4.21*   HEMOGLOBIN 12.5*   HEMATOCRIT 36.5*   MCV 86.7   MCH 29.7   MCHC 34.2   RDW 40.7   PLATELETCT 315   MPV 9.7     Recent Labs     03/10/24  0400 03/11/24  0729 03/12/24  0228   SODIUM 136 136 135   POTASSIUM 4.7 5.0 5.3   CHLORIDE 92* 93* 92*   CO2 23 23 20   GLUCOSE 96 88 75   BUN 83* 87* 93*   CREATININE 11.40* 11.88* 12.52*   CALCIUM 7.6* 8.3* 8.5     Recent Labs     03/11/24  1809   INR 1.16*     Recent Labs     03/11/24  1809   INR 1.16*         Assessment: This is a 73 y.o. male in need of a non-tunneled dialysis catheter. Will place today.    I explained the details of the operation, alternatives, and potential risks, including but not limited to bleeding, infection, injury to vessels or nerves, and risks of anesthesia.  All questions were answered. Patient understands and agrees to proceed.      Jean Sultana MD  [facility]  Voal[place] preferred. Otherwise please call my office [phone]  __________________________________________________________________  Patient:Kaleb Yanes   MRN:[id number]   CSN:[id number]      "

## 2024-03-12 NOTE — PROGRESS NOTES
Pt presents to IR1. Patient was consented by MD at bedside, confirmed by this RN and consent at bedside. Pt transferred to IR table in prone position. Patient underwent a Left nephrostomy tube placement by Dr. Boyd. Procedure site was marked by MD and verified using imaging guidance. Pt placed on monitor, prepped and draped in a sterile fashion. Vitals were taken every 5 minutes and remained stable during procedure (see doc flow sheet for results). CO2 waveform capnography was monitored and remained WNL throughout procedure. Report called to Geraldine PRATT. Pt transported by stretcher with RN to S177-1.         Flexima nephrostomy catheter system 8 Fr  REF: [id number]  LOT: [id number]  EXP: 11/13/2026

## 2024-03-12 NOTE — PROGRESS NOTES
Pt back from IR with a L neph tube placement. Dressing is CDI. Vitals taken. Pt sleeping and left on .   Messaged vascular to let them know of patient arriving on the unit so that they can place HD temp cath.

## 2024-03-12 NOTE — FACE TO FACE
Face to Face Supporting Documentation - Home Health    The encounter with this patient was in whole or in part the primary reason for home health admission.    Date of encounter:   Patient:                    MRN:                       YOB: 2022  Kaleb Yanes  [id number]  1950     Home health to see patient for:  Skilled Nursing care for assessment, interventions & education    Skilled need for:  New Onset Medical Diagnosis NEREIDA    Skilled nursing interventions to include:  Comment: pt/ot, b/l nephrostomy tube.    Homebound status evidenced by:  Needs the assistance of another person in order to leave the home. Leaving home requires a considerable and taxing effort. There is a normal inability to leave the home.    Community Physician to provide follow up care: Pcp Pt States None     Optional Interventions? No      I certify the face to face encounter for this home health care referral meets the CMS requirements and the encounter/clinical assessment with the patient was, in whole, or in part, for the medical condition(s) listed above, which is the primary reason for home health care. Based on my clinical findings: the service(s) are medically necessary, support the need for home health care, and the homebound criteria are met.  I certify that this patient has had a face to face encounter by myself.  Jim Pulido M.D. - NPI: [id number]

## 2024-03-12 NOTE — OP REPORT
VASCULAR SURGERY SERVICE  OPERATIVE NOTE      Date: 3/12/2024    Patient: Kaleb Yanes  : 1950  MRN: [id number]      Preoperative Diagnosis:  - Renal failure    Postoperative Diagnosis  - Renal failure    Procedure  33576 and 10075: Percutaneous access of the right internal jugular vein with ultrasound guidance and insertion of right internal jugular non-tunneled veno-venous dialysis catheter    Catheter used:  Carl 12Fr triple lumen dialysis catheter 16cm length    Surgeon: Jean Sultana MD, FACS    Blood loss: minimal    Disposition: Patient tolerated procedure well    A time-out was completed verifying correct patient, procedure, site, positioning, and special equipment if applicable. The patient was prepped and draped in sterile fashion. Surgical timeout was called.  1% Lidocaine was used to anesthetize the surrounding skin area. The vein was accessed percutaneously and the J-wire passed.  A small puncture incision was made at the insertion site, the tract was dilated, then the catheter was passed over the wire into the vein. Appropriate blood return was obtained. Each lumen of the catheter was evacuated of air, flushed with sterile saline, and capped. The catheter was then sutured in place to the skin and a sterile dressing applied.   The patient tolerated the procedure well and there were no complications.    Jean Sultana MD, FACS  [facility]

## 2024-03-12 NOTE — PROGRESS NOTES
Note to reader: this note follows the APSO format rather than the historical SOAP format. Assessment and plan located at the top of the note for ease of use.    Chief Complaint  73 y.o. year old male here with No chief complaint on file.      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 Re-consulted yesterday as patient's Cr continues to rise despite good UOP from R NPT, and LILY 3/11 demonstrated new moderate L hydronephrosis. Patient in IR at time of rounds, but per chart review, Cr today is 12.52 (11.88) and has been steadily rising, despite UOP of 1300cc from R NPT. PVRs have been <50cc. Per nephrology note, plans have been made for temp cath to be placed for HD as well.    Plan:  - Trend UOP from bilat NPT, trend serum Cr, continue cares per nephrology  - Ultimately will discharge with both NPT in place  - Urology will continue to follow     Case discussed with Dr Sr, who has directed this patient's plan of care.      Review of Systems  Physical Exam   Review of Systems   Unable to perform ROS: Other     Vitals:    03/12/24 1058 03/12/24 1100 03/12/24 1100 03/12/24 1123   BP: 102/56   116/70   Pulse:  70 71 72   Resp:  14 18 16   Temp:    36.1 °C (97 °F)   TempSrc:    Temporal   SpO2:  97% 97% 92%   Weight:       Height:         Physical Exam  Vitals and nursing note reviewed.          Hematology Chemistry   Lab Results   Component Value Date/Time    WBC 9.8 03/12/2024 02:28 AM     HEMOGLOBIN 12.5 (L) 03/12/2024 02:28 AM    HEMATOCRIT 36.5 (L) 03/12/2024 02:28 AM    PLATELETCT 315 03/12/2024 02:28 AM     Lab Results   Component Value Date/Time    SODIUM 135 03/12/2024 02:28 AM    POTASSIUM 5.3 03/12/2024 02:28 AM    CHLORIDE 92 (L) 03/12/2024 02:28 AM    CO2 20 03/12/2024 02:28 AM    GLUCOSE 75 03/12/2024 02:28 AM    BUN 93 (H) 03/12/2024 02:28 AM    CREATININE 12.52 (HH) 03/12/2024 02:28 AM    CREATININE 1.0 08/18/2007 05:50 AM         Labs not explicitly included in this progress note were reviewed by the author.   Radiology/imaging not explicitly included in this progress note was reviewed by the author.     Medications reviewed, Labs reviewed and Radiology images reviewed

## 2024-03-12 NOTE — PROGRESS NOTES
Messaged nephrology to let them know wife would like to speak to them.   Messaged urology to let them know wife would like to speak to them prior to tube placement. Updated patient. Wife not at bedside at this time

## 2024-03-12 NOTE — PROGRESS NOTES
[facility] -  PROGRESS NOTE               Author: Giancarlo Carreno D.O. Date & Time: 3/12/2024  12:47 PM     HPI:  74 yo male from [facility] with PMH significant for metastatic prostate cancer -  on hormone blocking medication, due for next injection in March. , CAD s/p coronary artery stent placemen (8/2007), HTN, and HLD who presented to OSH with hematuria associated with worsening left sided flank and back pain x 2 days. At OSH he was diagnosed with acute renal failure with presenting Crt of 7 and was transported to [facility] for higher level of care on 3/1/24. Previous Crt level noted to be 0.92 in July 2022 and 2 weeks prior to admission his creatinine was 1.37 . On arrival he had a Lasix renal scan with diuretic which revealed normal renal flow and function of the left kidney with decreased uptake and excretion by the right kidney; and findings suggestive of chronic right hydroureteronephrosis at the distal ureteral level with resultant right renal cortical atrophy. Urology was consulted and recommended a Arreola catheter placement. His Crt improved to 4.92 and further to 4.0 before worsening again to 5.13 today. A Rt Nephrostomy tube was placed by MT today to see if this will help improve renal functions. His flank pain has since resolved. Hematuria has cleared up as well. No F/C/N/V/CP/SOB.  No melena, hematochezia, hematemesis.  No HA, visual changes, or abdominal pain. Nephrology consulted for management of NEREIDA and possible need for dialysis      DAILY NEPHROLOGY SUMMARY:  03/04 - consult done  03/05 - SCr trended up, arreola dislodged and not replaced due to PVR 0, SBP 140s-160s, UOP 1075mL, no freely voided urine recorded, UA with moderate occult blood, 0-2 hyaline casts, no protein, no new c/o, reports that he is finally voiding and urine via bladder is clearing  03/06 - SCr trending down, UOP 970mL recorded, SBP 140s-170s, no new c/o, has been walking laps, wife encouraging him to increase  "PO intake  03/07 - SCr stable, UOP 1600mL, SBP 130s-160s, has c/o SOB overnight, mild tachypnea noted, CXR with some increased interstitial markings, IVF stopped this AM, reports now SOB improved and good UOP via NPT, +constipation ongoing, no other new c/o  03/08 - SBP 130s-170s, developed abrupt right symmetric hemianopsia yesterday, stat CTA with acute/subacute and chronic R occipital infarcts, MRI brain w/o with bilateral occipital infarcts and hemorrhagic transformation on the R, both images demonstrate osseous lesions, patient reports he believes his visual deficits may be slightly improved from yesterday, UOP 2L, SCr trended up to 8.95, +fatigue, no other new c/o  03/09 - SBP 100s-140s, UOP 1.2L, Scr trending up, vision slightly improved, ongoing fatigue, no new c/o but wife reports poor urine output via bladder, that patient feels he needs to go but then doesn't produce urine  03/10 - SCr trended UOP, UOP 790mL, bladder scan <50cc, SBP 120s-140s, patient and wife believe his UOP improving, he is going to the [employer] [place] today, notes some cough productive of phlegm, otherwise no new c/o  3/11: Cr continues to rise, but UOP 1100cc/day. Discussed potential need for dialysis. Wife at bedside and both pt and wife are very concerned.  3/12: renal US yesterday showed L hydronephrosis. Pt's wife and son at bedside this AM and discussed case. Pt reports feeling very weak/fatigued and with nausea and poor appetite    REVIEW OF SYSTEMS:    +weakness, +n, poor appetite, no CP  10 point ROS reviewed and is as per HPI/daily summary or otherwise negative    PMH/PSH/SH/FH: Reviewed and unchanged since admission note  CURRENT MEDICATIONS: Reviewed from admission to present day    VS:  /69   Pulse 65   Temp 36.1 °C (97 °F) (Temporal)   Resp 16   Ht 1.702 m (5' 7\")   Wt 62.5 kg (137 lb 12.6 oz)   SpO2 93%   BMI 21.63 kg/m²   Physical Exam  Vitals and nursing note reviewed.   Constitutional:       General: He is " not in acute distress.  HENT:      Head: Normocephalic and atraumatic.      Right Ear: External ear normal.      Left Ear: External ear normal.      Nose: Nose normal.      Mouth/Throat:      Mouth: Mucous membranes are moist.      Pharynx: Oropharynx is clear.   Eyes:      General: No scleral icterus.     Extraocular Movements: Extraocular movements intact.   Cardiovascular:      Rate and Rhythm: Normal rate and regular rhythm.   Pulmonary:      Effort: Pulmonary effort is normal.      Breath sounds: No wheezing or rales.   Abdominal:      General: Abdomen is flat. There is no distension.   Musculoskeletal:      Right lower leg: No edema.      Left lower leg: No edema.   Skin:     General: Skin is warm and dry.      Findings: No bruising.   Neurological:      Mental Status: He is alert and oriented to person, place, and time.      Motor: No weakness.   Psychiatric:         Mood and Affect: Mood normal.         Behavior: Behavior normal.         Thought Content: Thought content normal.         Judgment: Judgment normal.         Fluids:  In: 240 [P.O.:240]  Out: 900     LABS:  Recent Labs     03/10/24  0400 03/11/24  0729 03/12/24  0228   SODIUM 136 136 135   POTASSIUM 4.7 5.0 5.3   CHLORIDE 92* 93* 92*   CO2 23 23 20   GLUCOSE 96 88 75   BUN 83* 87* 93*   CREATININE 11.40* 11.88* 12.52*   CALCIUM 7.6* 8.3* 8.5     Imaging: reviewed    IMPRESSION:  # NEREIDA, worsening              - baseline Crt around 1.37 per labs 2 weeks prior to admission  - Presenting Crt 7 at OSH, improved to 4.0 after arreola placement but has worsened again  - Likely cause of initial NEREIDA: R renal obstruction, vs ATN (NSAIDS)  - L sided hydro noted on 3/11, s/p L nephrostomy on 3/12  - temp HD catheter to be placed for HD 3/12 due to severe uremia  # CKD 3  #Atrophic /Echogenic Rt kidney               - Lasix renal scan 3/1/24: Normal renal flow and function scan of the left kidney.              chronic right hydroureteronephrosis at the distal  ureteral level with resultant right renal cortical atrophy;   - s/p R nephrostomy  # Mild Hyperkalemia   # Severe Metabolic Acidosis, improved  # Metastatic Prostate Cancer to bones               - started on Firmagon a month ago   # Gross Hematuria, resolved   # Constipation  # Occipital CVA  # HTN  - goal <140/90          PLAN:    - New L sided obstruction was found and may be the cause of his current NEREIDA  - appreciate L nephrostomy tube placement  - however, pt is very uremic in symptoms and will still need temporary dialysis for uremia  - appreciate primary team/vascular team placement of temp HD line  - hopefully pt will need just 1 HD tx for uremia and pt will improve further w his B/L nephrostomy tubes  - continue BBL  - continue amlodipine, ok to adjust dose if needed  - Monitor urine output and B/L Nephrostomy output closely   - Daily labs   - Avoid Nephrotoxins, IV contrast  - Dose all meds per eGFR < 15  - strict I/O, use renal diet  - cont calcium acetate 1334mg TID with meals    Dispo: plan for temp HD today after line placement for uremia  Daily eval for renal recovery vs continued HD needs    Above recommendations discussed with the primary team    Please page nephrology with any questions or concerns

## 2024-03-12 NOTE — DISCHARGE PLANNING
0816  DPA sent HH referral and placed HH choice form in Pts chart    0929  DPA received [facility] fax advising they need Face to face sheet for HH    1034  DPA sent [facility] hard fax, Face to face sheet.    1520  Agency/Facility Name: [facility]  Outcome: ABE LVM requesting call back to follow up on pending HH referral

## 2024-03-12 NOTE — PROGRESS NOTES
[facility] Medicine Daily Progress Note    Date of Service  3/12/2024    Chief Complaint  Kaleb Yanes is a 73 y.o. male admitted 3/1/2024 with flank pain    Hospital Course  Kaleb Yanes is a 73 y.o. male who presented 3/1/2024 with acute renal failure due to ureteral obstruction.  The patient lives in [facility] had been having increased left-sided flank pain and back pain for which she has been taking ibuprofen this morning he woke up and had 3 episodes of hematuria and went to the emergency department where he was found to have acute renal failure and hyperkalemia 2 weeks ago his creatinine was 1.37 and on day of admission it was 7, potassium was 5.4.     Patient stated he initially attributed his flank pain and back pain to his bony metastases from his prostate cancer.  He would not have gone into the emergency room except for the hematuria, after the initial 3 episodes however his hematuria has now resolved, he denies any fever, chills, dysuria he thinks he has been making a normal amount of urine.  His appetite has been poor since beginning of the year but his family has been through a lot of stress and he thinks that his appetite is starting to improve he has not had any nausea or vomiting he does not describe anything that sounds like renal colic.    Interval Problem Update    MINNIE ON  SBP at goal ~130.  His wife noted improvement in his dysmetria.  Cognition and vision about the same, maybe a little better.  Nausea is much better. Was able to have dinner and breakfast.  Dr. Kelly insisted on renal diet. He was agreeable.  Having some urine from his bladder today.  Denies dysuria, hematuria.  Denies bowel dysfunction.  He and his wife want to get outside today for a mental break.  BMP reviewed, Cr increased to 11.4, BUN increased to 83.  POC discussed with nephrologist Dr. Kelly, she will evaluate today and likely will need RRT if renal function continues to worsen.  They are  hoping he can return home soon for recovery. Advised that we are still monitoring his renal function for need for RRT, which cannot be done at home due to need to rapidly coordinate HD.      3/11 patient is new to me today, patient is in bed, wife is at bedside, patient having clear light red urine in bag, Cr is not improving, discussed with nephrologist US renal ordered, discussed with patient's wife and patient, will repeat ct head on Wednesday. Continue monitoring, reviewed previous notes, imaging,labs available. Discussed with , charge nurse, bedside nurse.  3/12 patient is resting in bed, patient wife is at bedside, waiting for nephrostomy tube placement, I have discussed with nephrology, recommending temporary dialysis catheter placement, I have discussed with vascular surgery Dr. Sultana for dialysis catheter placement, patient remain n.p.o. waiting for procedures, discussed with bedside nurse charge nurse , I have reviewed CBC BMP phosphorus level INR, reviewed chest x-ray post IJ catheter placement my read no pneumothorax, increase cephalization.    I have discussed this patient's plan of care and discharge plan at IDT rounds today with Case Management, Nursing, Nursing leadership, and other members of the IDT team.    Consultants/Specialty  Urology  Nephrology  IR  Neurology  Vascular surgery    Code Status  DNAR/DNI    Disposition  The patient is not medically cleared for discharge to home or a post-acute facility.      I have placed the appropriate orders for post-discharge needs.    Review of Systems  Review of Systems   Constitutional:  Negative for malaise/fatigue.   Gastrointestinal:  Negative for constipation, diarrhea, nausea and vomiting.   Genitourinary:  Negative for dysuria and hematuria.   Musculoskeletal:  Negative for back pain, joint pain, myalgias and neck pain.   Neurological:  Negative for focal weakness and weakness.   Psychiatric/Behavioral:  Negative for  depression. The patient is not nervous/anxious.         Physical Exam  Temp:  [36.1 °C (96.9 °F)-36.8 °C (98.2 °F)] 36.1 °C (97 °F)  Pulse:  [65-81] 67  Resp:  [12-18] 16  BP: (102-140)/(56-79) 111/66  SpO2:  [91 %-98 %] 94 %    Physical Exam  Vitals and nursing note reviewed. Exam conducted with a chaperone present (Wife at bedside).   Constitutional:       Appearance: Normal appearance. He is not toxic-appearing or diaphoretic.   HENT:      Head: Normocephalic.      Nose: Nose normal.      Mouth/Throat:      Mouth: Mucous membranes are moist.   Eyes:      General: Visual field deficit (Symmetric RIght hemianopsia at greater than 60 degrees) present. No scleral icterus.  Cardiovascular:      Rate and Rhythm: Normal rate and regular rhythm.      Pulses: Normal pulses.      Heart sounds: Normal heart sounds.      No friction rub.   Pulmonary:      Effort: Pulmonary effort is normal. No respiratory distress.      Breath sounds: Normal breath sounds. No wheezing, rhonchi or rales.   Abdominal:      General: Abdomen is flat. Bowel sounds are normal. There is no distension.      Palpations: Abdomen is soft.      Tenderness: There is no guarding.      Comments: Right nephrostomy tube, clear  urine in bag.     Musculoskeletal:      Cervical back: Neck supple.      Right lower leg: No edema.      Left lower leg: No edema.   Skin:     General: Skin is warm and dry.      Comments: Right PNT site c/d/I, nontender   Neurological:      Mental Status: He is alert.      Cranial Nerves: No dysarthria or facial asymmetry.      Motor: No weakness (5/5 BLE and BUE) or pronator drift.      Coordination: Finger-Nose-Finger Test normal.   Psychiatric:         Attention and Perception: Attention and perception normal.         Mood and Affect: Mood and affect normal.         Speech: Speech normal.         Behavior: Behavior normal. Behavior is cooperative.         Cognition and Memory: Cognition and memory normal.          Fluids    Intake/Output Summary (Last 24 hours) at 3/12/2024 1527  Last data filed at 3/12/2024 0142  Gross per 24 hour   Intake --   Output 500 ml   Net -500 ml       Laboratory  Recent Labs     03/12/24  0228   WBC 9.8   RBC 4.21*   HEMOGLOBIN 12.5*   HEMATOCRIT 36.5*   MCV 86.7   MCH 29.7   MCHC 34.2   RDW 40.7   PLATELETCT 315   MPV 9.7       Recent Labs     03/10/24  0400 03/11/24  0729 03/12/24  0228   SODIUM 136 136 135   POTASSIUM 4.7 5.0 5.3   CHLORIDE 92* 93* 92*   CO2 23 23 20   GLUCOSE 96 88 75   BUN 83* 87* 93*   CREATININE 11.40* 11.88* 12.52*   CALCIUM 7.6* 8.3* 8.5     Recent Labs     03/11/24  1809   INR 1.16*                   Imaging  DX-CHEST-PORTABLE (1 VIEW)   Final Result      1.  Right IJ catheter in good position.      2.  Prominence of interstitial pulmonary vasculatures consistent with mild fluid overload.      IR-PERQ NEPH CATH NEW ACCESS (ALL RADIOLOGY) LEFT   Final Result      1. ULTRASOUND AND FLUOROSCOPIC GUIDED PLACEMENT OF A LEFT 8 [place]  PIGTAIL LOCKING LOOP PERCUTANEOUS NEPHROSTOMY CATHETER.      2. NEPHROSTOGRAM SHOWING SATISFACTORY CATHETER POSITION. MODERATE LEFT HYDRONEPHROSIS.      US-RENAL   Final Result      1.  Moderate LEFT hydronephrosis.   2.  Unremarkable RIGHT kidney.   3.  LEFT kidney cyst for which no further evaluation is necessary.      EC-ECHOCARDIOGRAM COMPLETE W/O CONT   Final Result      MR-BRAIN-W/O   Final Result      1.  Bilateral occipital infarcts. Hemorrhagic transformation is noted in the right occipital infarct.   2.  Abnormal T1 hypointensity of right side of C1 vertebral body and right occipital condyle. The review of CT scan of the neck demonstrates multifocal abnormal sclerotic areas. There is also destructive lesion in the right lateral condyle. These    findings are concerning for aggressive osseous process suggests metastasis. Further investigation is recommended.      CT-CTA NECK WITH & W/O-POST PROCESSING   Final Result   Addendum  (preliminary) 1 of 1   Addendum:      There are scattered sclerotic osseous lesions seen within the thoracic    spine, manubrium and partially visualized ribs consistent with sclerotic    osseous metastatic disease.      Final      1.  Prominent proximal internal carotid artery atherosclerosis with less than 50% stenosis.   2.  Patent vertebral arteries.   3.  Layering probably moderate bilateral pleural effusions. Some probable bilateral pulmonary edema.      CT-CTA HEAD WITH & W/O-POST PROCESS   Final Result      1.  Acute/subacute appearing and chronic right occipital infarcts.   2.  No large vessel occlusion or aneurysm.      DX-CHEST-PORTABLE (1 VIEW)   Final Result      1.  Increased perihilar interstitial markings. Findings may represent interstitial edema or atypical infection.      2.  Mild bibasilar opacities likely represent atelectasis.      IR-PERQ NEPH CATH NEW ACCESS (ALL RADIOLOGY) RIGHT   Final Result         1. Ultrasound And Fluoroscopic Guided Placement Of a right sided 8 Sami  Pigtail Locking Loop Percutaneous Nephrostomy Catheter.      2. Obstruction of the distal right ureter is demonstrated with no flow of contrast into the urinary bladder.      2. Nephrostogram Showing Satisfactory Catheter Position Without Extravasation.      NM-RENAL WITH DIURETIC WASHOUT   Final Result      1. Normal renal flow and function scan of the left kidney.   2. Decreased uptake and excretion of the radiotracer by the right kidney. Findings suggest chronic right hydroureteronephrosis at the distal ureteral level with resultant right renal cortical atrophy.      OUTSIDE IMAGES-CT ABDOMEN /PELVIS   Final Result           Assessment/Plan  * Occipital stroke (HCC)- (present on admission)  Assessment & Plan  Developed abrupt symmetric Right hemianopsia 3/7/24 with dysmetria  CT demonstrates acute-on-chronic Right occipital CVA without LVO  Neurology consulted  LDL <50, returned to atorvastatin 20  mg  Telemetry  PT/OT/SLP recommend   MRI brain demonstrates bilateral occipital infarcts with Right hemorrhage  Holding ASA/Plavix due to Right occipital hemorrhage  TTE normal  A1c 5.8 consistent with prediabetes  BG goal <180  SBP goal <130, increased lasix and titrated labetalol    Per neurology recommendation will require repeat CT on Wednesday or Thursday to evaluate restarting anticoagulation    Hypertension secondary to other renal disorders- (present on admission)  Assessment & Plan  Due to hypervolemia from NEREIDA/ATN  Improved with IV diuresis  Initiated on torsemide and amlodipine per Nephrology  Goal SBP <130 due to CVA per Neurology    Nausea  Assessment & Plan  Reports intermittent nausea and anorexia  Improved with scheduled zofran TIDAC  Unclear if this is symptomatic azotemia - if worsening will warrant RRT    Likely due to uremia  Likely will require HD soon.   Npo at [place]     Acute respiratory failure with hypoxia (HCC)  Assessment & Plan  Resolved with diuresis  Due to noncardiogenic pulmonary edema from NEREIDA  CXR increased interstitial edema  EKG not indicative of acute ischemic changes  Diuresis as tolerated  On RA  Hypoxia resolved    Constipation- (present on admission)  Assessment & Plan  Resolved  Continue bowel regimen    High anion gap metabolic acidosis- (present on admission)  Assessment & Plan  Resolved with iv bicarb  Due to NEREIDA  Following BMP    NEREIDA (acute kidney injury) (HCC)- (present on admission)  Assessment & Plan  Due to obstruction from obstructing stone and suspected intrinsic renal disease contributing  Plateau after recent NEREIDA suggestive of ATN  Worsened after CTA for occipital CVA due to contrast nephropathy  Nephrology consulted, no indication for RRT  Bicarb gtt discontinued due to noncardiogenic pulmonary edema  Avoid nephrotoxins  Repeat AM BMP  Kidney function not improving  US renal ordered. Discussed with nephrologist Dr Carreno     Patient going for nephrostomy tube  placement on the left side, discussed with vascular surgery Dr. Sultana for temporary dialysis catheter access    Hydronephrosis with urinary obstruction due to renal calculus- (present on admission)  Assessment & Plan  Vs stricture  See above  Johansen and nephrostomy R tube in place  Urology s/o  IR following  Will repeat US renal today. F/u results      Now with left-sided hydronephrosis status post nephrostomy tube placement    Gross hematuria- (present on admission)  Assessment & Plan    Due to percutaneous nephrostomy  Holding ASA for nephrostomy tube and due to hematuria  Improved.   Resolved    Hyperkalemia- (present on admission)  Assessment & Plan  Resolved  Due to NEREIDA  BMP reviewed  Follow-up BMP in a.m.    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 Shah      VTE prophylaxis: SCD due to hemorrhagic CVA    I have performed a physical exam and reviewed and updated ROS and Plan today (3/12/2024). In review of yesterday's note (3/11/2024), there are no changes except as documented above.      Total time of 54 minutes spent prepping to see patient (e.g. reviewing  tests/imaging results, notes from consultants, bedside nurse, night shift ) obtaining and/or reviewing separately obtained history. Performing a medically appropriate examination and evaluation.  Counseling and educating the patient.  Ordering medications, tests, or procedures.  Referring and communicating with other health care professionals.  Documenting clinical information in EPIC.  Independently interpreting results and communicating results to patient.  Care coordination.

## 2024-03-12 NOTE — CARE PLAN
The patient is Watcher - Medium risk of patient condition declining or worsening    Shift Goals  Clinical Goals: stable neuro assessments, comfort, education on diet  Patient Goals: rest  Family Goals: updates    Patient is not progressing towards the following goals:      Problem: Urinary Elimination  Goal: Establish and maintain regular urinary output  Outcome: Not Progressing     Problem: Neuro Status  Goal: Neuro status will remain stable or improve  Outcome: Not Progressing

## 2024-03-12 NOTE — PROGRESS NOTES
Xray done, no pneumo per MD Grigsby. Dialysis updated to put patient on transport list. Dialysis RN also reached out to MD Sultana and we are ok to use line.

## 2024-03-12 NOTE — PROGRESS NOTES
Monitor Summary: SR 75-84, AK 0.18, QRS 0.06, QT 0.45, with PACs/PVCs per strip from monitor room.

## 2024-03-12 NOTE — PROGRESS NOTES
Monitor Summary: SR 78-95 [place] 0.16 QRS 0.08 QT 0.38 with occasional PVCs and rare couplets per strip from monitor room.

## 2024-03-12 NOTE — THERAPY
Physical Therapy   Daily Treatment     Patient Name: Kaleb Yanes  Age:  73 y.o., Sex:  male  Medical Record #: [id number]  Today's Date: 3/12/2024     Precautions  Precautions: Fall Risk;Other (See Comments)  Comments: B/L nephrostomy tube (+); SBP goal < 130; Vision deficit    Assessment    Patient seen for PT treatment session. Patient in bed, agreeable for the session. Able to participate with bed mobility, sit-stand, LE ex's, ambulation as detailed below. Will continue to benefit from PT services to help improve his safety during functional mobility due to vision deficit. Recommend  PT upon DC     Underwent L nephrostomy tube placement 3/12, insertion of R IJ non tunneled venous dialysis catheter 3/12. Patient begin HD today.     Plan    Treatment Plan Status: Continue Current Treatment Plan  Type of Treatment: Bed Mobility, Gait Training, Stair Training, Therapeutic Activities, Therapeutic Exercise  Treatment Frequency: 3 Times per Week  Treatment Duration: Until Therapy Goals Met    DC Equipment Recommendations: Unable to determine at this time  Discharge Recommendations: Recommend home health for continued physical therapy services    Objective     03/12/24 1323   Precautions   Precautions Fall Risk;Other (See Comments)   Comments B/L nephrostomy tube (+); SBP goal < 130; Vision deficit   Vitals   Pulse 71   Patient BP Position Sitting  (EOB)   Blood Pressure  111/66   Pulse Oximetry 98 %   O2 Delivery Device None - Room Air   Pain   Pain Scales 0 to 10 Scale    Intervention Medication (see MAR);Rest;Repositioned   Pain 0 - 10 Group   Location Flank   Location Orientation Left   Therapist Pain Assessment Prior to Activity;During Activity;Post Activity  (Patient reported slight worsening of pain during mobility)   Cognition    Cognition / Consciousness X   Speech/ Communication Delayed Responses;Word Finding Impairment   Level of Consciousness Alert   Safety Awareness Impaired   Comments Patient  continues to have lack of carryover for compensatory strategies for his vision deficit   Sitting Lower Body Exercises   Sitting Lower Body Exercises Yes   Ankle Pumps 1 set of 10;Bilateral   Long Arc Quad 1 set of 10;Bilateral   Other Exercises Turning head side-side in preparation for visual scanning during mobility x 5-6 reps   Comments Seated EOB   Standing Lower Body Exercises   Standing Lower Body Exercises Yes   Other Exercises Turning head side-side in preparation for visual scanning during mobility while standing EOB; Standing EOB-patient cued to follow the therapist by turning his head from side-side; x5-6 reps x 2set   Vision   Vision Comments Smooth pursuit: Patient able to track on the L & R; Peripheral vision: R side: ~ 25 degree from nose angle; patient continues to miss objects more on the R and intermittently on the L   Other Treatments   Other Treatments Provided Patient was assisted with appropriate placement of nephrostomy bag prior to standing and ambulation   Balance   Sitting Balance (Static) Good   Sitting Balance (Dynamic) Fair +   Standing Balance (Static) Fair   Standing Balance (Dynamic) Fair -   Weight Shift Sitting Good   Weight Shift Standing Good   Skilled Intervention Verbal Cuing   Comments Seated EOB; Standing W/O AD   Bed Mobility    Supine to Sit Supervised  (Towards L side of the bed)   Sit to Supine Supervised  (Towards R side of the bed)   Scooting Supervised   Rolling Supervised   Skilled Intervention Verbal Cuing;Sequencing   Comments HOB flat; w/o use of rails; towards L side; increased time to sequence the task   Gait Analysis   Gait Level Of Assist Standby Assist   Assistive Device None   Distance (Feet) 40  (Distance limited due to increased flank pain requesting to return back to bed)   Deviation Bradykinetic;Other (Comment)  (Veering of gait due to vision deficit)   Vision Deficits Impacting Mobility Continues to have R peripheral vision deficit and intermittently  missing object on the L   Skilled Intervention Verbal Cuing;Sequencing;Facilitation   Comments Patient ambulated in the room and little into the hallway; during ambulation patient missed the room door on his L; missed his wife and room door on his R; increased difficulty to find his bed; constant cues for visual scanning prior to begining ambulation and during ambulation as needed.   Functional Mobility   Sit to Stand Standby Assist   Bed, Chair, Wheelchair Transfer Refused   Mobility Bed-EOB-ambulate in the room-EOB-bed   Skilled Intervention Verbal Cuing;Sequencing;Facilitation   6 Clicks Assessment - How much HELP from from another person do you currently need... (If the patient hasn't done an activity recently, how much help from another person do you think he/she would need if he/she tried?)   Turning from your back to your side while in a flat bed without using bedrails? 4   Moving from lying on your back to sitting on the side of a flat bed without using bedrails? 3   Moving to and from a bed to a chair (including a wheelchair)? 3   Standing up from a chair using your arms (e.g., wheelchair, or bedside chair)? 3   Walking in hospital room? 3   Climbing 3-5 steps with a railing? 3   6 clicks Mobility Score 19   Patient / Family Goals    Patient / Family Goal #1 To return home   Goal #1 Outcome Progressing as expected   Short Term Goals    Short Term Goal # 1 Patient will perform supine-sit with HOB flat towards R and L side of the bed independently in 6 visits to get in & out of bed safely   Goal Outcome # 1 Progressing as expected   Short Term Goal # 2 Patient will perform chair transfers towards R side without verbal cues with supervision in 6 visits   Goal Outcome # 2 Goal not met   Short Term Goal # 3 Patient will ambulate 300 feet around obstacles & narrow spaces without verbal cues with supervision in 6 visits   Goal Outcome # 3 Progressing as expected   Physical Therapy Treatment Plan   Physical Therapy  Treatment Plan Continue Current Treatment Plan   Treatment Plan  Bed Mobility;Gait Training;Stair Training;Therapeutic Activities;Therapeutic Exercise   Treatment Frequency 3 Times per Week   Duration Until Therapy Goals Met   Anticipated Discharge Equipment and Recommendations   DC Equipment Recommendations Unable to determine at this time   Discharge Recommendations Recommend home health for continued physical therapy services   Interdisciplinary Plan of Care Collaboration   IDT Collaboration with  Nursing;Family / Caregiver   Patient Position at End of Therapy In Bed;Bed Alarm On;Call Light within Reach;Tray Table within Reach;Family / Friend in Room   Session Information   Date / Session Number  3/12-2(2/3, 3/14)

## 2024-03-12 NOTE — PROGRESS NOTES
Brief IR note:     Pt out of room for Left nephrostomy tube placement in IR suite after US showed left hydronephrosis. Chart, notes, and labs reviewed.

## 2024-03-12 NOTE — DOCUMENTATION QUERY
[facility]                                                                       Query Response Note      PATIENT:               MASON JIMÉNEZ  ACCT #:                  [id number]  MRN:                     [id number]  :                      1950  ADMIT DATE:       3/1/2024 8:18 AM  DISCH DATE:          RESPONDING  PROVIDER #:        [id number]           QUERY TEXT:    Please provide additional clinical indicators supportive of your documented diagnosis of Acute respiratory failure with hypoxia      The patient's Clinical Indicators include:  Findings:  3/7 PN: Overnight he became dyspneic and anxious. CXR demonstrated increased interstitial edema SpO2:  [92 %-98 %] 94 %     Epic vitals:   3/6 o2 sat 90-98%  Respirations 17-18 Roomair-2 L   3/7 02sat 92-96 Respirations 14-19 1-2L ;   3/8 02sat 90-97 Respirations 17-18 Roomair-1L ;    3/9 02sat 92-94 Respirations 16-18 Roomair ;  3/10 02sat 90-97% Respirations 16-18 Room air     Treatment: Iv lasix     Risk Factors: pulmonary edema     Casandra Núñez RN BSN  Clinical Documentation   [email or web address]  Connect via Voal[employer] Messenger  Options provided:   -- Acute respiratory failure with hypoxia exists, (please document additional clinical indicators)   -- Acute respiratory failure with hypoxia does not exist and amended documentation provided in the medical record   -- Other explanation, (please specify other explanation)      Query created by: Casandra Mercedes on 3/12/2024 3:51 AM    RESPONSE TEXT:    Other explanation - hypoxia          Electronically signed by:  JESSE TARANGO MD 3/12/2024 3:23 PM

## 2024-03-13 ENCOUNTER — APPOINTMENT (OUTPATIENT)
Dept: RADIOLOGY | Facility: MEDICAL CENTER | Age: 74
DRG: 673 | End: 2024-03-13
Attending: HOSPITALIST
Payer: MEDICARE

## 2024-03-13 PROBLEM — E44.0 MODERATE PROTEIN-CALORIE MALNUTRITION (HCC): Status: ACTIVE | Noted: 2024-03-13

## 2024-03-13 PROBLEM — Z99.2 ACUTE HEMODIALYSIS PATIENT (HCC): Status: ACTIVE | Noted: 2024-03-13

## 2024-03-13 LAB
ALBUMIN SERPL BCP-MCNC: 3.5 G/DL (ref 3.2–4.9)
BUN SERPL-MCNC: 40 MG/DL (ref 8–22)
CALCIUM ALBUM COR SERPL-MCNC: 9.3 MG/DL (ref 8.5–10.5)
CALCIUM SERPL-MCNC: 8.9 MG/DL (ref 8.5–10.5)
CHLORIDE SERPL-SCNC: 99 MMOL/L (ref 96–112)
CO2 SERPL-SCNC: 22 MMOL/L (ref 20–33)
CREAT SERPL-MCNC: 3.88 MG/DL (ref 0.5–1.4)
ERYTHROCYTE [DISTWIDTH] IN BLOOD BY AUTOMATED COUNT: 41.1 FL (ref 35.9–50)
GFR SERPLBLD CREATININE-BSD FMLA CKD-EPI: 16 ML/MIN/1.73 M 2
GLUCOSE SERPL-MCNC: 77 MG/DL (ref 65–99)
HCT VFR BLD AUTO: 37 % (ref 42–52)
HGB BLD-MCNC: 12.3 G/DL (ref 14–18)
MCH RBC QN AUTO: 28.9 PG (ref 27–33)
MCHC RBC AUTO-ENTMCNC: 33.2 G/DL (ref 32.3–36.5)
MCV RBC AUTO: 86.9 FL (ref 81.4–97.8)
PHOSPHATE SERPL-MCNC: 3.4 MG/DL (ref 2.5–4.5)
PLATELET # BLD AUTO: 273 K/UL (ref 164–446)
PMV BLD AUTO: 9.7 FL (ref 9–12.9)
POTASSIUM SERPL-SCNC: 4.3 MMOL/L (ref 3.6–5.5)
RBC # BLD AUTO: 4.26 M/UL (ref 4.7–6.1)
SODIUM SERPL-SCNC: 139 MMOL/L (ref 135–145)
WBC # BLD AUTO: 9.9 K/UL (ref 4.8–10.8)

## 2024-03-13 PROCEDURE — 700102 HCHG RX REV CODE 250 W/ 637 OVERRIDE(OP): Performed by: INTERNAL MEDICINE

## 2024-03-13 PROCEDURE — 700102 HCHG RX REV CODE 250 W/ 637 OVERRIDE(OP): Performed by: STUDENT IN AN ORGANIZED HEALTH CARE EDUCATION/TRAINING PROGRAM

## 2024-03-13 PROCEDURE — 700102 HCHG RX REV CODE 250 W/ 637 OVERRIDE(OP)

## 2024-03-13 PROCEDURE — A9270 NON-COVERED ITEM OR SERVICE: HCPCS | Performed by: HOSPITALIST

## 2024-03-13 PROCEDURE — 700102 HCHG RX REV CODE 250 W/ 637 OVERRIDE(OP): Performed by: HOSPITALIST

## 2024-03-13 PROCEDURE — 80069 RENAL FUNCTION PANEL: CPT

## 2024-03-13 PROCEDURE — A9270 NON-COVERED ITEM OR SERVICE: HCPCS | Performed by: INTERNAL MEDICINE

## 2024-03-13 PROCEDURE — 99233 SBSQ HOSP IP/OBS HIGH 50: CPT | Performed by: STUDENT IN AN ORGANIZED HEALTH CARE EDUCATION/TRAINING PROGRAM

## 2024-03-13 PROCEDURE — 700111 HCHG RX REV CODE 636 W/ 250 OVERRIDE (IP): Performed by: STUDENT IN AN ORGANIZED HEALTH CARE EDUCATION/TRAINING PROGRAM

## 2024-03-13 PROCEDURE — 770020 HCHG ROOM/CARE - TELE (206)

## 2024-03-13 PROCEDURE — A9270 NON-COVERED ITEM OR SERVICE: HCPCS | Performed by: STUDENT IN AN ORGANIZED HEALTH CARE EDUCATION/TRAINING PROGRAM

## 2024-03-13 PROCEDURE — 70450 CT HEAD/BRAIN W/O DYE: CPT

## 2024-03-13 PROCEDURE — 5A1D70Z PERFORMANCE OF URINARY FILTRATION, INTERMITTENT, LESS THAN 6 HOURS PER DAY: ICD-10-PCS | Performed by: INTERNAL MEDICINE

## 2024-03-13 PROCEDURE — A9270 NON-COVERED ITEM OR SERVICE: HCPCS

## 2024-03-13 PROCEDURE — 85027 COMPLETE CBC AUTOMATED: CPT

## 2024-03-13 PROCEDURE — 97535 SELF CARE MNGMENT TRAINING: CPT

## 2024-03-13 PROCEDURE — 700101 HCHG RX REV CODE 250: Performed by: HOSPITALIST

## 2024-03-13 RX ADMIN — TAMSULOSIN HYDROCHLORIDE 0.4 MG: 0.4 CAPSULE ORAL at 04:48

## 2024-03-13 RX ADMIN — Medication 1334 MG: at 13:23

## 2024-03-13 RX ADMIN — LABETALOL HYDROCHLORIDE 200 MG: 200 TABLET, FILM COATED ORAL at 08:38

## 2024-03-13 RX ADMIN — DOCUSATE SODIUM 50 MG AND SENNOSIDES 8.6 MG 2 TABLET: 8.6; 5 TABLET, FILM COATED ORAL at 17:34

## 2024-03-13 RX ADMIN — LABETALOL HYDROCHLORIDE 200 MG: 200 TABLET, FILM COATED ORAL at 20:15

## 2024-03-13 RX ADMIN — AMLODIPINE BESYLATE 5 MG: 5 TABLET ORAL at 04:48

## 2024-03-13 RX ADMIN — ONDANSETRON 4 MG: 4 TABLET, ORALLY DISINTEGRATING ORAL at 13:22

## 2024-03-13 RX ADMIN — Medication 1334 MG: at 08:38

## 2024-03-13 RX ADMIN — LIDOCAINE 1 PATCH: 4 PATCH TOPICAL at 04:49

## 2024-03-13 RX ADMIN — OXYCODONE 5 MG: 5 TABLET ORAL at 10:36

## 2024-03-13 RX ADMIN — LABETALOL HYDROCHLORIDE 200 MG: 200 TABLET, FILM COATED ORAL at 16:05

## 2024-03-13 RX ADMIN — ATORVASTATIN CALCIUM 20 MG: 20 TABLET, FILM COATED ORAL at 17:34

## 2024-03-13 RX ADMIN — APIXABAN 5 MG: 5 TABLET, FILM COATED ORAL at 19:19

## 2024-03-13 RX ADMIN — ONDANSETRON 4 MG: 4 TABLET, ORALLY DISINTEGRATING ORAL at 08:36

## 2024-03-13 RX ADMIN — Medication 1334 MG: at 20:14

## 2024-03-13 RX ADMIN — ONDANSETRON 4 MG: 4 TABLET, ORALLY DISINTEGRATING ORAL at 17:34

## 2024-03-13 ASSESSMENT — COGNITIVE AND FUNCTIONAL STATUS - GENERAL
EATING MEALS: A LITTLE
TOILETING: A LITTLE
SUGGESTED CMS G CODE MODIFIER DAILY ACTIVITY: CK
DRESSING REGULAR UPPER BODY CLOTHING: A LITTLE
PERSONAL GROOMING: A LITTLE
DAILY ACTIVITIY SCORE: 18
DRESSING REGULAR LOWER BODY CLOTHING: A LITTLE
HELP NEEDED FOR BATHING: A LITTLE

## 2024-03-13 ASSESSMENT — ENCOUNTER SYMPTOMS
DIZZINESS: 0
PALPITATIONS: 0
MYALGIAS: 0
SHORTNESS OF BREATH: 0
FEVER: 0
VOMITING: 0
HEARTBURN: 0
SPEECH CHANGE: 0
FOCAL WEAKNESS: 0
CHILLS: 0
BLURRED VISION: 0
COUGH: 0
HEADACHES: 0
ABDOMINAL PAIN: 0
NAUSEA: 0
FLANK PAIN: 0

## 2024-03-13 ASSESSMENT — FIBROSIS 4 INDEX: FIB4 SCORE: 1.99

## 2024-03-13 ASSESSMENT — PAIN DESCRIPTION - PAIN TYPE: TYPE: ACUTE PAIN

## 2024-03-13 NOTE — PROGRESS NOTES
Monitor Summary: SR 68-81, [place] .0.16, QRS .0.10, QT .0.40 with rare PVCs per strip from monitor room

## 2024-03-13 NOTE — PROGRESS NOTES
[facility] -  PROGRESS NOTE               Author: Giancarlo Carreno D.O. Date & Time: 3/13/2024  1:44 PM     HPI:  74 yo male from [facility] with PMH significant for metastatic prostate cancer -  on hormone blocking medication, due for next injection in March. , CAD s/p coronary artery stent placemen (8/2007), HTN, and HLD who presented to OSH with hematuria associated with worsening left sided flank and back pain x 2 days. At OSH he was diagnosed with acute renal failure with presenting Crt of 7 and was transported to [facility] for higher level of care on 3/1/24. Previous Crt level noted to be 0.92 in July 2022 and 2 weeks prior to admission his creatinine was 1.37 . On arrival he had a Lasix renal scan with diuretic which revealed normal renal flow and function of the left kidney with decreased uptake and excretion by the right kidney; and findings suggestive of chronic right hydroureteronephrosis at the distal ureteral level with resultant right renal cortical atrophy. Urology was consulted and recommended a Arreola catheter placement. His Crt improved to 4.92 and further to 4.0 before worsening again to 5.13 today. A Rt Nephrostomy tube was placed by TX today to see if this will help improve renal functions. His flank pain has since resolved. Hematuria has cleared up as well. No F/C/N/V/CP/SOB.  No melena, hematochezia, hematemesis.  No HA, visual changes, or abdominal pain. Nephrology consulted for management of NEREIDA and possible need for dialysis      DAILY NEPHROLOGY SUMMARY:  03/04 - consult done  03/05 - SCr trended up, arreola dislodged and not replaced due to PVR 0, SBP 140s-160s, UOP 1075mL, no freely voided urine recorded, UA with moderate occult blood, 0-2 hyaline casts, no protein, no new c/o, reports that he is finally voiding and urine via bladder is clearing  03/06 - SCr trending down, UOP 970mL recorded, SBP 140s-170s, no new c/o, has been walking laps, wife encouraging him to increase  PO intake  03/07 - SCr stable, UOP 1600mL, SBP 130s-160s, has c/o SOB overnight, mild tachypnea noted, CXR with some increased interstitial markings, IVF stopped this AM, reports now SOB improved and good UOP via NPT, +constipation ongoing, no other new c/o  03/08 - SBP 130s-170s, developed abrupt right symmetric hemianopsia yesterday, stat CTA with acute/subacute and chronic R occipital infarcts, MRI brain w/o with bilateral occipital infarcts and hemorrhagic transformation on the R, both images demonstrate osseous lesions, patient reports he believes his visual deficits may be slightly improved from yesterday, UOP 2L, SCr trended up to 8.95, +fatigue, no other new c/o  03/09 - SBP 100s-140s, UOP 1.2L, Scr trending up, vision slightly improved, ongoing fatigue, no new c/o but wife reports poor urine output via bladder, that patient feels he needs to go but then doesn't produce urine  03/10 - SCr trended UOP, UOP 790mL, bladder scan <50cc, SBP 120s-140s, patient and wife believe his UOP improving, he is going to the [employer] today, notes some cough productive of phlegm, otherwise no new c/o  3/11: Cr continues to rise, but UOP 1100cc/day. Discussed potential need for dialysis. Wife at bedside and both pt and wife are very concerned.  3/12: renal US yesterday showed L hydronephrosis. Pt's wife and son at bedside this AM and discussed case. Pt reports feeling very weak/fatigued and with nausea and poor appetite  3/13: pt received HD yesterday for uremic symptoms. Had L nephrstomy tube placed, with ~ 1.9L UOP. Cr today 3.9. Pt complaining of pain over new nephrostomy site    REVIEW OF SYSTEMS:    +weakness, +n, poor appetite, no CP. + pain over nephrostomy site  10 point ROS reviewed and is as per HPI/daily summary or otherwise negative    PMH/PSH/SH/FH: Reviewed and unchanged since admission note  CURRENT MEDICATIONS: Reviewed from admission to present day    VS:  /70   Pulse 74   Temp 36.3 °C (97.4 °F)  "(Temporal)   Resp 18   Ht 1.702 m (5' 7\")   Wt 57 kg (125 lb 10.6 oz)   SpO2 95%   BMI 19.72 kg/m²   Physical Exam  Vitals and nursing note reviewed.   Constitutional:       General: He is not in acute distress.  HENT:      Head: Normocephalic and atraumatic.      Right Ear: External ear normal.      Left Ear: External ear normal.      Nose: Nose normal.      Mouth/Throat:      Mouth: Mucous membranes are moist.      Pharynx: Oropharynx is clear.   Eyes:      General: No scleral icterus.     Extraocular Movements: Extraocular movements intact.   Cardiovascular:      Rate and Rhythm: Normal rate and regular rhythm.   Pulmonary:      Effort: Pulmonary effort is normal.      Breath sounds: No wheezing or rales.   Abdominal:      General: Abdomen is flat. There is no distension.   Musculoskeletal:      Right lower leg: No edema.      Left lower leg: No edema.   Skin:     General: Skin is warm and dry.      Findings: No bruising.   Neurological:      Mental Status: He is alert and oriented to person, place, and time.      Motor: No weakness.   Psychiatric:         Mood and Affect: Mood normal.         Behavior: Behavior normal.         Thought Content: Thought content normal.         Judgment: Judgment normal.         Fluids:  In: 500 [Dialysis:500]  Out: 2830     LABS:  Recent Labs     03/11/24  0729 03/12/24  0228 03/13/24  0400   SODIUM 136 135 139   POTASSIUM 5.0 5.3 4.3   CHLORIDE 93* 92* 99   CO2 23 20 22   GLUCOSE 88 75 77   BUN 87* 93* 40*   CREATININE 11.88* 12.52* 3.88*   CALCIUM 8.3* 8.5 8.9     Imaging: reviewed    IMPRESSION:  # NEREIDA, improving              - baseline Crt around 1.37 per labs 2 weeks prior to admission  - Presenting Crt 7 at OSH, improved to 4.0 after arreola placement but has worsened again to peak Cr 12.5  - L sided hydro noted on 3/11, s/p L nephrostomy on 3/12  - temp HD x1 on 3/12 due to severe uremia sx  # CKD 3  #Atrophic /Echogenic Rt kidney               - Lasix renal scan 3/1/24: " Normal renal flow and function scan of the left kidney.              chronic right hydroureteronephrosis at the distal ureteral level with resultant right renal cortical atrophy;   - s/p R nephrostomy  # Mild Hyperkalemia   # Severe Metabolic Acidosis, improved  # Metastatic Prostate Cancer to bones               - started on Firmagon a month ago   # Gross Hematuria, resolved   # Constipation  # Occipital CVA  # HTN  - goal <140/90          PLAN:    -Cr significantly improved after Hdx1 and relief of obstruction  -do not anticipate further HD, but keep line in for now  - continue labetalol, amlodipine for BP  - Monitor urine output and B/L Nephrostomy output closely   - Daily labs   - Avoid Nephrotoxins, IV contrast  - Dose all meds per eGFR < 15  - strict I/O, use renal diet  - cont calcium acetate 1334mg TID with meals    Dispo: keep temp HD line for now. Can remove tomorrow if Cr continues to downtrend    Please page nephrology with any questions or concerns

## 2024-03-13 NOTE — THERAPY
Occupational Therapy  Daily Treatment     Patient Name: Kaleb Yanes  Age:  73 y.o., Sex:  male  Medical Record #: [id number]  Today's Date: 3/13/2024     Precautions  Precautions: Fall Risk, Other (See Comments)  Comments: B nephrostomy tubes, SBP <130, R homonymous hemianopsia    Assessment    Pt seen for OT session. C/o dizziness and nausea this session with standing activities. Wife reports pt will have 24/7 assist with friends/neighbors when she has to run errands. Educated pt and wife on adaptive techniques (red tape) for edges of furniture, steps, and around home for safety with navigation/coordination, eating with medication to prevent nausea, protein w/in diet restrictions for healing/energy, adaptive clothing/techniques for B nephrostomy bags, and importance of continued practice using adaptive visual techniques (lighthouse/scanning). Continues to be limited by decreased functional mobility, activity tolerance, cognition, coordination, balance, nausea/dizziness, vision, and pain which are currently affecting pt's ability to complete ADLs/IADLs at baseline. Will continue to follow.     Plan    Treatment Plan Status: Continue Current Treatment Plan  Type of Treatment: Self Care / Activities of Daily Living, Adaptive Equipment, Therapeutic Activity, Therapeutic Exercises  Treatment Frequency: 2 Times per Week  Treatment Duration: Until Therapy Goals Met    DC Equipment Recommendations: Tub / Shower Seat  Discharge Recommendations: Recommend home health for continued occupational therapy services     Objective     03/13/24 0946   Precautions   Precautions Fall Risk;Other (See Comments)   Comments B nephrostomy tubes, SBP <130, R homonymous hemianopsia   Vitals   O2 Delivery Device None - Room Air   Vitals Comments reports increased nausea and dizziness with standing req seated RB   Pain 0 - 10 Group   Therapist Pain Assessment Post Activity Pain Same as Prior to Activity;During Activity;Nurse  Notified  (no c/o pain)   Cognition    Cognition / Consciousness X   Speech/ Communication Delayed Responses   Level of Consciousness Alert   Safety Awareness Impaired   New Learning Impaired   Comments Very pleasant and cooperative; decreased carryover of education for compensatory strategies w/vision deficit   Passive ROM Upper Body   Passive ROM Upper Body WDL   Active ROM Upper Body   Active ROM Upper Body  WDL   Strength Upper Body   Upper Body Strength  WDL   Other Treatments   Other Treatments Provided Wife reports pt will have 24/7 assist with friends/neighbors when she has to run errands. Educated pt and wife on adaptive techniques (red tape) for edges of furniture, steps, and around home for safety with navigation/coordination, eating with medication to prevent nausea, protein w/in diet restrictions for healing/energy, adaptive clothing/techniques for B nephrostomy bags, and importance of continued practice using adaptive visual techniques (lighthouse/scanning).   Balance   Sitting Balance (Static) Good   Sitting Balance (Dynamic) Fair +   Standing Balance (Static) Fair   Standing Balance (Dynamic) Fair -   Weight Shift Sitting Good   Weight Shift Standing Good   Skilled Intervention Verbal Cuing;Tactile Cuing   Comments no ADs   Bed Mobility    Supine to Sit Supervised   Sit to Supine Supervised   Scooting Supervised   Skilled Intervention Verbal Cuing;Tactile Cuing   Comments HOB slightly elevated   Activities of Daily Living   Eating Minimal Assist   Grooming Standby Assist   Toileting Contact Guard Assist   Skilled Intervention Verbal Cuing;Compensatory Strategies   Modified [place] (mRS)   Modified Jose A Score 3   Functional Mobility   Sit to Stand Supervised   Bed, Chair, Wheelchair Transfer Supervised   Toilet Transfers Supervised   Transfer Method Stand Step   Mobility w/o AD in room   Skilled Intervention Verbal Cuing;Compensatory Strategies;Tactile Cuing   Visual Perception   Visual Perception  X    Comments Pt reports distinguishing shapes and figure ground has improved. Completed exercise locating different colored objects aross visual field; req extra time for scanning to R, but able to locate. Reports can see depth enough that can distinguish between something laying flat vs standing up. Continues to have delayed B coordination, but able to safely modify to grasp objects w/o pushing them over.   Activity Tolerance   Comments limited by dizziness, fatigue, and vision   Patient / Family Goals   Patient / Family Goal #1 to go home   Short Term Goals   Short Term Goal # 1 will complete vision exercises/adaptive techniques w/SPV   Goal Outcome # 1 Progressing as expected   Short Term Goal # 2 standing g/h with SPV using BUEs and using adaptive techniques for scanning environment w/min v/cs   Goal Outcome # 2 Progressing as expected   Education Group   Education Provided Home Safety;Adaptive Equipment   Home Safety Patient Response Patient;Acceptance;Explanation;Verbal Demonstration;Reinforcement Needed;Family   Adaptive Equipment Patient Response Patient;Family;Acceptance;Explanation;Verbal Demonstration;Reinforcement Needed

## 2024-03-13 NOTE — PROGRESS NOTES
Pt's wife at bedside. Educated on nephrostomy flushes and dressing change. Verbalized understanding.

## 2024-03-13 NOTE — PROGRESS NOTES
Monitor Summary: SR 67-76, NE 0.18, QRS 0.08, QT 0.40, with occasional/rare PVCs, rare PACs and 5 beats of Vtach per strip from monitor room.

## 2024-03-13 NOTE — PROGRESS NOTES
[facility] Progress Note         HD #1 today x 2 hours per Dr. Carreno.  Tx initiated at 1550 and ended at 1748    NET UF: +15 ml    Tx ended with RT 2 mins d/t clotting. VS within acceptable range. Blood returned, ports flushed with NS. Heparin 1000 units/ml used to lock catheter per designated amount. CVC ports clamped and capped. Aspirate heparin prior to next CVC use. See eflow sheets for further details.    Report given to  BRANDI Boo RN

## 2024-03-13 NOTE — PROGRESS NOTES
Note to reader: this note follows the APSO format rather than the historical SOAP format. Assessment and plan located at the top of the note for ease of use.    Chief Complaint  73 y.o. year old male here with No chief complaint on file.      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 Seen and examined. S/p IR placement of L NPT yesterday as well as HD; Cr improved from 12.88 to 3.88. Excellent UOP-- 925cc from R NPT and 1470cc from left. Antegrade nephrostogram did not demonstrate proximal obstruction of ureter. PSA 73.4 (prior to firmagon in Jan, PSA >250), total testosterone 13. Discussed with pharmacist, ok for bicalutamide 150mg QD, not necessary to adjust dose per renal function. Pt denies pain, N/V/F/C.     3/12. Re-consulted yesterday as patient's Cr continues to rise despite good UOP from R NPT, and LILY 3/11 demonstrated new moderate L hydronephrosis. Patient in IR at time of rounds, but per chart review, Cr today is 12.52 (11.88) and has been steadily rising, despite UOP of 1300cc from R NPT. PVRs have been <50cc. Per nephrology note, plans have been made for temp cath to be placed for HD as well.    Plan:  - Trend UOP from bilat NPT, trend serum Cr, continue cares per nephrology  - Ultimately will discharge with both NPT in place and urology will arrange outpatient follow up for further workup  - Will start bicalutamide 150mg QD during admission, and as soon as patient is  discharged, will arrange appt with Dr John to restart outpatient ADT  - Urology will continue to follow     Case discussed with Dr John, who has directed this patient's plan of care.      Review of Systems  Physical Exam   Review of Systems   Constitutional:  Negative for chills and fever.   Gastrointestinal:  Negative for abdominal pain, nausea and vomiting.   Genitourinary:  Negative for dysuria, flank pain and hematuria.   All other systems reviewed and are negative.    Vitals:    03/13/24 0315 03/13/24 0728 03/13/24 1036 03/13/24 1235   BP: 112/71 112/71  111/70   Pulse: 75 75  74   Resp: 18 18 18 18   Temp: 36.3 °C (97.4 °F) 36.7 °C (98.1 °F)  36.3 °C (97.4 °F)   TempSrc: Temporal Temporal  Temporal   SpO2: 95% 92% 96% 95%   Weight: 57 kg (125 lb 10.6 oz)      Height:         Physical Exam  Vitals and nursing note reviewed.   HENT:      Head: Normocephalic.      Nose: Nose normal.      Mouth/Throat:      Pharynx: Oropharynx is clear.   Eyes:      Conjunctiva/sclera: Conjunctivae normal.   Pulmonary:      Effort: Pulmonary effort is normal.   Abdominal:      General: There is no distension.      Palpations: Abdomen is soft.      Comments: L NPT draining pink tinged urine, R NPT draining clear yellow urine   Musculoskeletal:         General: Normal range of motion.      Cervical back: Normal range of motion.   Skin:     General: Skin is warm.   Neurological:      General: No focal deficit present.      Mental Status: He is alert.   Psychiatric:         Mood and Affect: Mood normal.         Behavior: Behavior normal.          Hematology Chemistry   Lab Results   Component Value Date/Time    WBC 9.9 03/13/2024 04:00 AM    HEMOGLOBIN 12.3 (L) 03/13/2024 04:00 AM    HEMATOCRIT 37.0 (L) 03/13/2024 04:00 AM    PLATELETCT 273 03/13/2024 04:00 AM     Lab Results   Component Value Date/Time    SODIUM 139 03/13/2024 04:00 AM    POTASSIUM 4.3 03/13/2024 04:00 AM    CHLORIDE 99 03/13/2024 04:00 AM    CO2 22 03/13/2024  04:00 AM    GLUCOSE 77 03/13/2024 04:00 AM    BUN 40 (H) 03/13/2024 04:00 AM    CREATININE 3.88 (H) 03/13/2024 04:00 AM    CREATININE 1.0 08/18/2007 05:50 AM         Labs not explicitly included in this progress note were reviewed by the author.   Radiology/imaging not explicitly included in this progress note was reviewed by the author.     Medications reviewed, Labs reviewed and Radiology images reviewed

## 2024-03-13 NOTE — PROGRESS NOTES
"Radiology Progress Note   Author: JUAREZ Dixon Date & Time created: 3/13/2024 8:59 AM   Date of admission  3/1/2024  Note to reader: this note follows the APSO format rather than the historical SOAP format. Assessment and plan located at the top of the note for ease of use.    Chief Complaint  73 y.o. male admitted 3/1/2024 with right sided back pain    HPI  Kaleb Yanes is a 72 yo male with PMH significant for prostate cancer, CAD s/p coronary artery stent placemen (8/2007), HTN, and HLD who presented to OSH with hematuria associated increasing left sided flank and back pain and was diagnosed with acute renal failure. Pt was transported to [facility] for higher level of care. 3/1/24 Lasix renal scan with diuretic washout revealed normal renal flow and function of the left kidney with decreased uptake and excretion by the right kidney. \"Findings suggest chronic right hydroureteronephrosis at the distal ureteral level with resultant right renal cortical atrophy\" (Avinash Crystal MD) 3/3/24 IR was consulted and Dr. Waite from IR performed a right nephrostogram with 8 fr nephrostomy tube placement on 03/04/24.     Interval History:   03/04/24- Pt  not in room undergoing right nephrostogram with right 8 fr nephrostomy tube placement  For maximal decompression of kidney  MAG3 shows decreased renal function.    03/05/24-right #8 Cypriot nephrostomy tube with 775 mL of pink urine, without clots, output in the last 24 hours.  Ordered placed for RNs to flush nephrostomy tube only for bloody urine or urine with clots.  Renal function continues to worsen despite nephrostomy tube placement  I reviewed the most recent labs: WBC 11.7; Hgb  12.3,  Cr 7.14  Coags INR 1.19,      03/06/24-right #8 Cypriot nephrostomy tube with 400 mL of pink urine, without clots, output in the last 24 hours.  Ordered placed for RNs to flush nephrostomy tube ONLY for bloody urine or urine with clots.Discussed drain care/management  " with pt and family at bedside.   I reviewed the most recent labs: WBC 11.0; Hgb  12.6,  Cr 6.79 ; Coags INR 1.19,     03/08/24-unfortunately yesterday a.m. patient's wife noted visual changes and patient . Neurology was consulted and he underwent a CT with contrast followed by MRI brain which confirmed bilateral occipital infarcts with some hemorrhagic transformation in the right.  He was transferred to the neurology floor. Right #8 Grenadian nephrostomy tube with 2000 mL of straw-colored urine, output in the last 24 hours.  Ordered placed for RNs to flush nephrostomy tube ONLY for bloody urine or urine with clots.Discussed drain care/management  with pt and patient wife at bedside. I reviewed the most recent labs: WBC 10.4; Hgb 12.0,  Cr 8.95; -increase in creatinine most likely reflective from contrast given with CT.    03/11/24 -right neph tube to right flank with 1100 mL output in the last 24 hours. Wife reports output was light red this morning but has resolved and is currently straw colored.  Labs reviewed; creatinine 11.88, GFR 4, no CBC today.  Nephrologist considering hemodialysis tomorrow if creatinine does not improve.      03/12/24 - Left neph tube placed by IR Dr Boyd. HD temp catheter placed by vascular surgeon Dr Sultana. Pt started dialysis.     03/13/24 - Right neph tube to right flank with 425 mL output in the last 24 hours. Left neph tube to left flank with 1920 mL output in the last 24 hours. Labs reviewed; WBC 9.9, creatinine 3.88, GFR 16.     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)    Hyperkalemia    Gross hematuria    Hydronephrosis with urinary obstruction due to renal calculus    NEREIDA (acute kidney injury) (HCC)    High anion gap metabolic acidosis    Constipation    Acute respiratory failure with hypoxia (HCC)    Nausea    Hypertension secondary to other renal disorders      Plan IR  -   Irrigate bilat Nephrostomy tube with 10 ml of sterile saline q shift ONLY IF no output of bloody output  -Avoid all nephrotoxic agents  - Nephrology and urology following  - Ultimate plan is to discharge with both neph tubes in place and fu with urology once kidney function improves.   - For long term use, Nephrostomy tube will need to be exchanged every 3 months.  - Ok to shower with catheter as long as site is covered with waterproof dressing; change dressing if it becomes wet.  - No baths or submerging site under water until catheter removed   - From an IR standpoint, OK to discharge with bilat neph tubes in place with urology follow up once medically cleared.   - IR will continue to follow nephrostomy tube while pt is in the hospital; we may not round every day     -Thank you for allowing Interventional Radiology team to participate in the patients care, if any additional care or requests are needed in the future please do not hesitate call or place IR order. [phone]           Review of Systems  Physical Exam   Review of Systems   Constitutional:  Positive for malaise/fatigue. Negative for chills and fever.   HENT:  Negative for hearing loss.    Eyes:  Negative for blurred vision.   Respiratory:  Negative for cough and shortness of breath.    Cardiovascular:  Negative for chest pain.   Gastrointestinal:  Negative for abdominal pain, heartburn, nausea and vomiting.   Genitourinary:  Negative for flank pain.   Neurological:  Negative for dizziness, speech change, focal weakness and headaches.      Vitals:    03/13/24 0728   BP: 112/71   Pulse: 75   Resp: 18   Temp: 36.7 °C (98.1 °F)   SpO2: 92%        Physical Exam  Vitals and nursing note reviewed.   Constitutional:       Appearance: Normal appearance.   Cardiovascular:      Rate and Rhythm: Normal rate.   Pulmonary:      Effort: Pulmonary effort is normal. No respiratory distress.   Abdominal:      General: Abdomen is flat.      Palpations: Abdomen is soft.    Genitourinary:     Comments: nephrostomy tubes to bilateral flank  Skin:     Capillary Refill: Capillary refill takes less than 2 seconds.   Neurological:      Mental Status: He is alert and oriented to person, place, and time. Mental status is at baseline.      Motor: No weakness.      Comments:                Labs    Recent Labs     03/12/24 0228 03/13/24  0400   WBC 9.8 9.9   RBC 4.21* 4.26*   HEMOGLOBIN 12.5* 12.3*   HEMATOCRIT 36.5* 37.0*   MCV 86.7 86.9   MCH 29.7 28.9   MCHC 34.2 33.2   RDW 40.7 41.1   PLATELETCT 315 273   MPV 9.7 9.7       Recent Labs     03/11/24  0729 03/12/24 0228 03/13/24  0400   SODIUM 136 135 139   POTASSIUM 5.0 5.3 4.3   CHLORIDE 93* 92* 99   CO2 23 20 22   GLUCOSE 88 75 77   BUN 87* 93* 40*   CREATININE 11.88* 12.52* 3.88*   CALCIUM 8.3* 8.5 8.9     Recent Labs     03/11/24 0729 03/12/24 0228 03/13/24  0400   ALBUMIN 3.2 3.2 3.5   CREATININE 11.88* 12.52* 3.88*     CT-HEAD W/O   Final Result         1.  Low-density changes in the bilateral parietal occipital lobes, greater on the left and overall more pronounced compared to prior study, appearance compatible with evolving infarcts.   2.  Nonspecific white matter changes, commonly associated with small vessel ischemic disease.  Associated mild cerebral atrophy is noted.   3.  Atherosclerosis.         DX-CHEST-PORTABLE (1 VIEW)   Final Result      1.  Right IJ catheter in good position.      2.  Prominence of interstitial pulmonary vasculatures consistent with mild fluid overload.      IR-PERQ NEPH CATH NEW ACCESS (ALL RADIOLOGY) LEFT   Final Result      1. ULTRASOUND AND FLUOROSCOPIC GUIDED PLACEMENT OF A LEFT 8 Estonian  PIGTAIL LOCKING LOOP PERCUTANEOUS NEPHROSTOMY CATHETER.      2. NEPHROSTOGRAM SHOWING SATISFACTORY CATHETER POSITION. MODERATE LEFT HYDRONEPHROSIS.      US-RENAL   Final Result      1.  Moderate LEFT hydronephrosis.   2.  Unremarkable RIGHT kidney.   3.  LEFT kidney cyst for which no further evaluation is  necessary.      EC-ECHOCARDIOGRAM COMPLETE W/O CONT   Final Result      MR-BRAIN-W/O   Final Result      1.  Bilateral occipital infarcts. Hemorrhagic transformation is noted in the right occipital infarct.   2.  Abnormal T1 hypointensity of right side of C1 vertebral body and right occipital condyle. The review of CT scan of the neck demonstrates multifocal abnormal sclerotic areas. There is also destructive lesion in the right lateral condyle. These    findings are concerning for aggressive osseous process suggests metastasis. Further investigation is recommended.      CT-CTA NECK WITH & W/O-POST PROCESSING   Final Result   Addendum (preliminary) 1 of 1   Addendum:      There are scattered sclerotic osseous lesions seen within the thoracic    spine, manubrium and partially visualized ribs consistent with sclerotic    osseous metastatic disease.      Final      1.  Prominent proximal internal carotid artery atherosclerosis with less than 50% stenosis.   2.  Patent vertebral arteries.   3.  Layering probably moderate bilateral pleural effusions. Some probable bilateral pulmonary edema.      CT-CTA HEAD WITH & W/O-POST PROCESS   Final Result      1.  Acute/subacute appearing and chronic right occipital infarcts.   2.  No large vessel occlusion or aneurysm.      DX-CHEST-PORTABLE (1 VIEW)   Final Result      1.  Increased perihilar interstitial markings. Findings may represent interstitial edema or atypical infection.      2.  Mild bibasilar opacities likely represent atelectasis.      IR-PERQ NEPH CATH NEW ACCESS (ALL RADIOLOGY) RIGHT   Final Result         1. Ultrasound And Fluoroscopic Guided Placement Of a right sided 8 Gabonese  Pigtail Locking Loop Percutaneous Nephrostomy Catheter.      2. Obstruction of the distal right ureter is demonstrated with no flow of contrast into the urinary bladder.      2. Nephrostogram Showing Satisfactory Catheter Position Without Extravasation.      NM-RENAL WITH DIURETIC WASHOUT  "  Final Result      1. Normal renal flow and function scan of the left kidney.   2. Decreased uptake and excretion of the radiotracer by the right kidney. Findings suggest chronic right hydroureteronephrosis at the distal ureteral level with resultant right renal cortical atrophy.      OUTSIDE IMAGES-CT ABDOMEN /PELVIS   Final Result        INR   Date Value Ref Range Status   03/11/2024 1.16 (H) 0.87 - 1.13 Final     Comment:     INR - Non-therapeutic Reference Range: 0.87-1.13  INR - Therapeutic Reference Range: 2.0-4.0       No results found for: \"POCINR\"     Intake/Output Summary (Last 24 hours) at 3/4/2024 1535  Last data filed at 3/4/2024 0332  Gross per 24 hour   Intake 120 ml   Output 800 ml   Net -680 ml      Labs not explicitly included in this progress note were reviewed by the author. Radiology/imaging not explicitly included in this progress note was reviewed by the author.                    INR   Date Value Ref Range Status   03/02/2024 1.21 (H) 0.87 - 1.13 Final       Comment:       INR - Non-therapeutic Reference Range: 0.87-1.13  INR - Therapeutic Reference Range: 2.0-4.0         No results found for: \"POCINR\"      Intake/Output Summary (Last 24 hours) at 3/3/2024 1626  Last data filed at 3/3/2024 1200      Gross per 24 hour   Intake 28 ml   Output 1997.5 ml   Net -1969.5 ml       Labs not explicitly included in this progress note were reviewed by the author. Radiology/imaging not explicitly included in this progress note was reviewed by the author.      I have performed a physical exam and reviewed and updated ROS and Plan today (3/5/2024).      31 minutes in directly providing and coordinating care and extensive data review.  No time overlap and excludes procedures.                   "

## 2024-03-13 NOTE — CARE PLAN
The patient is Stable - Low risk of patient condition declining or worsening    Shift Goals  Clinical Goals: Monitor neuro assessments, IR for neph tube and temp dialysis line  Patient Goals: Speak to physicians regarding procedures  Family Goals: Stay updated on POC and speak to MDs    Progress made toward(s) clinical / shift goals:      Problem: Knowledge Deficit - Standard  Goal: Patient and family/care givers will demonstrate understanding of plan of care, disease process/condition, diagnostic tests and medications  Outcome: Progressing  Note: Discussed POC with patient and wife and pt son. Reached out to nephrology and urology as patient wife had concerns that she wanted addressed. Nephrology at bedside to talk to patient. Urology attempted but missed wife as she was away from the bedside. Urology will come back tomorrow and text RN prior to coming to ensure we can call wife so that they can chat. Discussed procedures such as nephrostomy tube placement, temp dialysis line placement, and dialysis procedure. Pt verbalizes understanding but still wanting to speak to urology which she is aware and will be by tomorrow.      Problem: Urinary Elimination  Goal: Establish and maintain regular urinary output as seen by an inc in neph tube output.   Outcome: Progressing  Note: Pt received neph tube. Inc in urine output through left neph tube of over 1400+ this shift since placement.      Problem: Mobility  Goal: Patient's capacity to carry out activities will improve as seen by pt being out of bed x 3 this shift.   Outcome: Progressing  Note: Pt worked with PT. Out of bed x 2 with assistance of staff. Encouraged mobility even if it was edge of bed to improve his strength and relief discomfort from bed. Pt sucessefully out of bed x 2 with rn and x 1 with PT which was goal.      Problem: Neuro Status  Goal: Neuro status will remain stable or improve as seen by no new findings or changes on finding on q4h neuro checks this  shift   Outcome: Progressing  Note: No new findings on q4h neuro checks. Assessing neuro more frequently if indicated though it has not. NIH stroke scale completed x 1 this shift as pt had a previous stroke.        Patient is not progressing towards the following goals:

## 2024-03-13 NOTE — CARE PLAN
Problem: Knowledge Deficit - Standard  Goal: Patient and family/care givers will demonstrate understanding of plan of care, disease process/condition, diagnostic tests and medications  Outcome: Progressing     Problem: Urinary Elimination  Goal: Establish and maintain regular urinary output  Outcome: Progressing     Problem: Mobility  Goal: Patient's capacity to carry out activities will improve  Outcome: Progressing     Problem: Pain - Standard  Goal: Alleviation of pain or a reduction in pain to the patient’s comfort goal  Outcome: Progressing     Problem: Neuro Status  Goal: Neuro status will remain stable or improve  Outcome: Progressing   The patient is Stable - Low risk of patient condition declining or worsening    Shift Goals  Clinical Goals: stable neuro, monitor drains  Patient Goals: rest  Family Goals: updates    Progress made toward(s) clinical / shift goals:  stable neuro status. Denies pain.    Patient is not progressing towards the following goals:

## 2024-03-13 NOTE — CARE PLAN
The patient is Watcher - Medium risk of patient condition declining or worsening    Shift Goals  Clinical Goals: stable neuro assessments, rest, monitor drain  Patient Goals: rest and comfort  Family Goals: updates      Patient is progressing towards the following goals:    Problem: Urinary Elimination  Goal: Establish and maintain regular urinary output  Outcome: Progressing     Problem: Self Care  Goal: Patient will have the ability to perform ADLs independently or with assistance (bathe, groom, dress, toilet and feed)  Outcome: Progressing        never

## 2024-03-14 ENCOUNTER — PHARMACY VISIT (OUTPATIENT)
Dept: PHARMACY | Facility: MEDICAL CENTER | Age: 74
End: 2024-03-14
Payer: COMMERCIAL

## 2024-03-14 VITALS
SYSTOLIC BLOOD PRESSURE: 121 MMHG | BODY MASS INDEX: 19.03 KG/M2 | HEART RATE: 70 BPM | HEIGHT: 67 IN | WEIGHT: 121.25 LBS | OXYGEN SATURATION: 98 % | RESPIRATION RATE: 18 BRPM | DIASTOLIC BLOOD PRESSURE: 72 MMHG | TEMPERATURE: 98 F

## 2024-03-14 PROBLEM — N13.9 ACUTE BILATERAL OBSTRUCTIVE UROPATHY: Status: ACTIVE | Noted: 2024-03-14

## 2024-03-14 PROBLEM — J96.01 ACUTE RESPIRATORY FAILURE WITH HYPOXIA (HCC): Status: RESOLVED | Noted: 2024-03-07 | Resolved: 2024-03-14

## 2024-03-14 PROBLEM — Z66 DNR (DO NOT RESUSCITATE): Status: ACTIVE | Noted: 2024-03-14

## 2024-03-14 PROBLEM — K59.00 CONSTIPATION: Status: RESOLVED | Noted: 2024-03-03 | Resolved: 2024-03-14

## 2024-03-14 PROBLEM — C79.51 MALIGNANT NEOPLASM OF PROSTATE METASTATIC TO BONE (HCC): Status: ACTIVE | Noted: 2024-03-14

## 2024-03-14 PROBLEM — D68.59 HYPERCOAGULABLE STATE (HCC): Status: ACTIVE | Noted: 2024-03-14

## 2024-03-14 PROBLEM — R31.0 GROSS HEMATURIA: Status: RESOLVED | Noted: 2024-03-01 | Resolved: 2024-03-14

## 2024-03-14 PROBLEM — I15.1 HYPERTENSION SECONDARY TO OTHER RENAL DISORDERS: Status: RESOLVED | Noted: 2024-03-10 | Resolved: 2024-03-14

## 2024-03-14 PROBLEM — E87.5 HYPERKALEMIA: Status: RESOLVED | Noted: 2024-03-01 | Resolved: 2024-03-14

## 2024-03-14 PROBLEM — R11.0 NAUSEA: Status: RESOLVED | Noted: 2024-03-09 | Resolved: 2024-03-14

## 2024-03-14 PROBLEM — E87.29 HIGH ANION GAP METABOLIC ACIDOSIS: Status: RESOLVED | Noted: 2024-03-02 | Resolved: 2024-03-14

## 2024-03-14 PROBLEM — Z99.2 ACUTE HEMODIALYSIS PATIENT (HCC): Status: RESOLVED | Noted: 2024-03-13 | Resolved: 2024-03-14

## 2024-03-14 PROBLEM — N17.0 ACUTE RENAL FAILURE WITH TUBULAR NECROSIS (HCC): Status: ACTIVE | Noted: 2024-03-14

## 2024-03-14 PROBLEM — C61 MALIGNANT NEOPLASM OF PROSTATE METASTATIC TO BONE (HCC): Status: ACTIVE | Noted: 2024-03-14

## 2024-03-14 PROBLEM — I63.531 ACUTE RIGHT PCA STROKE (HCC): Status: ACTIVE | Noted: 2024-03-14

## 2024-03-14 LAB
ALBUMIN SERPL BCP-MCNC: 3.7 G/DL (ref 3.2–4.9)
BUN SERPL-MCNC: 28 MG/DL (ref 8–22)
CALCIUM ALBUM COR SERPL-MCNC: 9.3 MG/DL (ref 8.5–10.5)
CALCIUM SERPL-MCNC: 9.1 MG/DL (ref 8.5–10.5)
CHLORIDE SERPL-SCNC: 101 MMOL/L (ref 96–112)
CO2 SERPL-SCNC: 24 MMOL/L (ref 20–33)
CREAT SERPL-MCNC: 1.89 MG/DL (ref 0.5–1.4)
GFR SERPLBLD CREATININE-BSD FMLA CKD-EPI: 37 ML/MIN/1.73 M 2
GLUCOSE SERPL-MCNC: 119 MG/DL (ref 65–99)
PHOSPHATE SERPL-MCNC: 2.5 MG/DL (ref 2.5–4.5)
POTASSIUM SERPL-SCNC: 4.2 MMOL/L (ref 3.6–5.5)
SODIUM SERPL-SCNC: 139 MMOL/L (ref 135–145)

## 2024-03-14 PROCEDURE — A9270 NON-COVERED ITEM OR SERVICE: HCPCS

## 2024-03-14 PROCEDURE — 700111 HCHG RX REV CODE 636 W/ 250 OVERRIDE (IP): Performed by: STUDENT IN AN ORGANIZED HEALTH CARE EDUCATION/TRAINING PROGRAM

## 2024-03-14 PROCEDURE — 700102 HCHG RX REV CODE 250 W/ 637 OVERRIDE(OP): Performed by: INTERNAL MEDICINE

## 2024-03-14 PROCEDURE — 700102 HCHG RX REV CODE 250 W/ 637 OVERRIDE(OP): Performed by: STUDENT IN AN ORGANIZED HEALTH CARE EDUCATION/TRAINING PROGRAM

## 2024-03-14 PROCEDURE — A9270 NON-COVERED ITEM OR SERVICE: HCPCS | Performed by: STUDENT IN AN ORGANIZED HEALTH CARE EDUCATION/TRAINING PROGRAM

## 2024-03-14 PROCEDURE — A9270 NON-COVERED ITEM OR SERVICE: HCPCS | Performed by: INTERNAL MEDICINE

## 2024-03-14 PROCEDURE — 80069 RENAL FUNCTION PANEL: CPT

## 2024-03-14 PROCEDURE — RXMED WILLOW AMBULATORY MEDICATION CHARGE: Performed by: STUDENT IN AN ORGANIZED HEALTH CARE EDUCATION/TRAINING PROGRAM

## 2024-03-14 PROCEDURE — 700102 HCHG RX REV CODE 250 W/ 637 OVERRIDE(OP)

## 2024-03-14 PROCEDURE — 700101 HCHG RX REV CODE 250: Performed by: HOSPITALIST

## 2024-03-14 PROCEDURE — 99239 HOSP IP/OBS DSCHRG MGMT >30: CPT | Performed by: STUDENT IN AN ORGANIZED HEALTH CARE EDUCATION/TRAINING PROGRAM

## 2024-03-14 RX ORDER — AMLODIPINE BESYLATE 5 MG/1
5 TABLET ORAL DAILY
Qty: 30 TABLET | Refills: 1 | Status: SHIPPED | OUTPATIENT
Start: 2024-03-15 | End: 2024-03-14

## 2024-03-14 RX ORDER — LIDOCAINE 4 G/G
1 PATCH TOPICAL DAILY
Qty: 30 PATCH | Refills: 1 | Status: SHIPPED | OUTPATIENT
Start: 2024-03-15 | End: 2024-03-14

## 2024-03-14 RX ORDER — BICALUTAMIDE 50 MG/1
150 TABLET, FILM COATED ORAL DAILY
Qty: 30 TABLET | Refills: 1 | Status: SHIPPED | OUTPATIENT
Start: 2024-03-14 | End: 2024-03-14

## 2024-03-14 RX ORDER — BICALUTAMIDE 50 MG/1
150 TABLET, FILM COATED ORAL DAILY
Status: DISCONTINUED | OUTPATIENT
Start: 2024-03-14 | End: 2024-03-14 | Stop reason: HOSPADM

## 2024-03-14 RX ADMIN — LIDOCAINE 1 PATCH: 4 PATCH TOPICAL at 06:03

## 2024-03-14 RX ADMIN — LABETALOL HYDROCHLORIDE 200 MG: 200 TABLET, FILM COATED ORAL at 09:11

## 2024-03-14 RX ADMIN — ONDANSETRON 4 MG: 4 TABLET, ORALLY DISINTEGRATING ORAL at 10:52

## 2024-03-14 RX ADMIN — Medication 1334 MG: at 10:52

## 2024-03-14 RX ADMIN — APIXABAN 5 MG: 5 TABLET, FILM COATED ORAL at 06:03

## 2024-03-14 RX ADMIN — BICALUTAMIDE 150 MG: 50 TABLET, FILM COATED ORAL at 08:48

## 2024-03-14 RX ADMIN — TAMSULOSIN HYDROCHLORIDE 0.4 MG: 0.4 CAPSULE ORAL at 06:04

## 2024-03-14 RX ADMIN — AMLODIPINE BESYLATE 5 MG: 5 TABLET ORAL at 06:04

## 2024-03-14 ASSESSMENT — ENCOUNTER SYMPTOMS
HEARTBURN: 0
FOCAL WEAKNESS: 0
FEVER: 0
CHILLS: 0
NAUSEA: 0
SHORTNESS OF BREATH: 0
DIZZINESS: 0
VOMITING: 0
FLANK PAIN: 0
HEADACHES: 0
ABDOMINAL PAIN: 0
BLURRED VISION: 0
SPEECH CHANGE: 0
COUGH: 0

## 2024-03-14 ASSESSMENT — PAIN DESCRIPTION - PAIN TYPE: TYPE: ACUTE PAIN

## 2024-03-14 ASSESSMENT — FIBROSIS 4 INDEX: FIB4 SCORE: 2.29

## 2024-03-14 NOTE — ASSESSMENT & PLAN NOTE
Body mass index is 19.72 kg/m².    Significant subcutaneous tissue and muscle loss.    Likely due to malignancy.    Nutrition consult

## 2024-03-14 NOTE — DIETARY
Nutrition Services: Brief Update    Consult received for Failure to Thrive.     Pt seen at bedside with spouse present. Both pt and spouse report PO intake has increased in recent days, PO intake for last few days on average is 50-75% meals. Spouse reporting per discussion with MD, no longer needs to follow renal diet on discharge. Discussed high protein and high kcal foods to aid in patients goal to gain weight back and promote muscle strength. Spouse already has grocery list going for plan to make smoothies at home with whey protein powder and greek yogurt with fruits and vegetables. Pt with mild muscle and mild fat wasting of the clavicals. Potential d/c today.     Skin: No wounds or edema noted.   Labs: Glucose 119, BUN 28, Creat 1.89, GFR 37  Meds: lipitor, phos-lo, zofran prn, senna, dulcolax and miralax prn  Last BM: 3/10    Malnutrition Risk: Moderate malnutrition in the context of acute illness r/t NEREIDA and hydronephrosis as evidenced by mild muscle and mild fat wasting.    Problem: Nutritional:  Goal: Achieve adequate nutritional intake  Description: Patient will consume >50% of meals  Outcome: MET    No nutrition interventions needed at this time, will monitor per department policy as PO intake has improved.

## 2024-03-14 NOTE — ASSESSMENT & PLAN NOTE
As per history. Bare metal stent to circumflex artery in 2007.    3/13/2024  Starting apixaban today  Continue atorvastatin

## 2024-03-14 NOTE — PROGRESS NOTES
[facility] Medicine Daily Progress Note    Date of Service  3/13/2024    Chief Complaint  Kaleb Yanes is a 73 y.o. male admitted 3/1/2024 with flank pain    Hospital Course  Kaleb Yanes is a 73 y.o. male who presented 3/1/2024 with acute renal failure due to ureteral obstruction.  The patient lives in [facility] had been having increased left-sided flank pain and back pain for which she has been taking ibuprofen this morning he woke up and had 3 episodes of hematuria and went to the emergency department where he was found to have acute renal failure and hyperkalemia 2 weeks ago his creatinine was 1.37 and on day of admission it was 7, potassium was 5.4.     Patient stated he initially attributed his flank pain and back pain to his bony metastases from his prostate cancer.  He would not have gone into the emergency room except for the hematuria, after the initial 3 episodes however his hematuria has now resolved, he denies any fever, chills, dysuria he thinks he has been making a normal amount of urine.  His appetite has been poor since beginning of the year but his family has been through a lot of stress and he thinks that his appetite is starting to improve he has not had any nausea or vomiting he does not describe anything that sounds like renal colic.    Interval Problem Update    MINNIE ON  SBP at goal ~130.  His wife noted improvement in his dysmetria.  Cognition and vision about the same, maybe a little better.  Nausea is much better. Was able to have dinner and breakfast.  Dr. Kelly insisted on renal diet. He was agreeable.  Having some urine from his bladder today.  Denies dysuria, hematuria.  Denies bowel dysfunction.  He and his wife want to get outside today for a mental break.  BMP reviewed, Cr increased to 11.4, BUN increased to 83.  POC discussed with nephrologist Dr. Kelly, she will evaluate today and likely will need RRT if renal function continues to worsen.  They are  hoping he can return home soon for recovery. Advised that we are still monitoring his renal function for need for RRT, which cannot be done at home due to need to rapidly coordinate HD.      3/11 patient is new to me today, patient is in bed, wife is at bedside, patient having clear light red urine in bag, Cr is not improving, discussed with nephrologist US renal ordered, discussed with patient's wife and patient, will repeat ct head on Wednesday. Continue monitoring, reviewed previous notes, imaging,labs available. Discussed with , charge nurse, bedside nurse.  3/12 patient is resting in bed, patient wife is at bedside, waiting for nephrostomy tube placement, I have discussed with nephrology, recommending temporary dialysis catheter placement, I have discussed with vascular surgery Dr. Sultana for dialysis catheter placement, patient remain n.p.o. waiting for procedures, discussed with bedside nurse charge nurse , I have reviewed CBC BMP phosphorus level INR, reviewed chest x-ray post IJ catheter placement my read no pneumothorax, increase cephalization.      Above per previous hospitalist.      3/13/2024  No acute events overnight.  Creatinine improving to 3.88.  Had dialysis yesterday none today.  Watching creatinine levels closely.  Long discussion with urology at bedside with the patient and wife.    I discussed the plan of care with the patient and wife as well.  Patient and wife is prepared to go home once medically cleared.      I have discussed this patient's plan of care and discharge plan at IDT rounds today with Case Management, Nursing, Nursing leadership, and other members of the IDT team.    Consultants/Specialty  Urology  Nephrology  IR  Neurology  Vascular surgery    Code Status  DNAR/DNI    Disposition  The patient is not medically cleared for discharge to home or a post-acute facility.  Anticipate discharge to: home with organized home healthcare and close outpatient  follow-up    I have placed the appropriate orders for post-discharge needs.    Review of Systems  Review of Systems   Constitutional:  Negative for chills and fever.   Respiratory:  Negative for cough and shortness of breath.    Cardiovascular:  Negative for chest pain and palpitations.   Gastrointestinal:  Negative for abdominal pain, nausea and vomiting.   Musculoskeletal:  Negative for joint pain and myalgias.   Neurological:  Negative for dizziness and headaches.        Physical Exam  Temp:  [36.3 °C (97.3 °F)-36.7 °C (98.1 °F)] 36.3 °C (97.3 °F)  Pulse:  [71-78] 78  Resp:  [18] 18  BP: (109-134)/(62-74) 132/74  SpO2:  [92 %-96 %] 92 %    Physical Exam  Vitals and nursing note reviewed. Exam conducted with a chaperone present.   Constitutional:       General: He is not in acute distress.     Appearance: Normal appearance. He is underweight. He is not ill-appearing.   HENT:      Head: Normocephalic and atraumatic.      Mouth/Throat:      Mouth: Mucous membranes are moist.      Pharynx: Oropharynx is clear. No oropharyngeal exudate.   Eyes:      General: No scleral icterus.        Right eye: No discharge.         Left eye: No discharge.      Conjunctiva/sclera: Conjunctivae normal.   Cardiovascular:      Rate and Rhythm: Normal rate and regular rhythm.      Pulses: Normal pulses.      Heart sounds: Murmur heard.      Comments: Right sided dialysis catheter  Pulmonary:      Effort: Pulmonary effort is normal. No respiratory distress.      Breath sounds: Normal breath sounds.   Abdominal:      General: Abdomen is flat. Bowel sounds are normal. There is no distension.      Palpations: Abdomen is soft.   Musculoskeletal:         General: No swelling.      Cervical back: Neck supple. No tenderness.      Right lower leg: No edema.      Left lower leg: No edema.      Comments: Significant subcutaneous tissue and muscle loss   Skin:     General: Skin is warm and dry.      Coloration: Skin is pale.   Neurological:       Mental Status: He is alert and oriented to person, place, and time. Mental status is at baseline.      Motor: No weakness.      Comments: No drift.  Cranial nerves II through XII are intact.  Motor strength is full in both the upper and lower extremities.   Psychiatric:         Thought Content: Thought content normal.         Judgment: Judgment normal.         Fluids    Intake/Output Summary (Last 24 hours) at 3/13/2024 1818  Last data filed at 3/13/2024 1200  Gross per 24 hour   Intake --   Output 1400 ml   Net -1400 ml       Laboratory  Recent Labs     03/12/24  0228 03/13/24  0400   WBC 9.8 9.9   RBC 4.21* 4.26*   HEMOGLOBIN 12.5* 12.3*   HEMATOCRIT 36.5* 37.0*   MCV 86.7 86.9   MCH 29.7 28.9   MCHC 34.2 33.2   RDW 40.7 41.1   PLATELETCT 315 273   MPV 9.7 9.7       Recent Labs     03/11/24  0729 03/12/24 0228 03/13/24  0400   SODIUM 136 135 139   POTASSIUM 5.0 5.3 4.3   CHLORIDE 93* 92* 99   CO2 23 20 22   GLUCOSE 88 75 77   BUN 87* 93* 40*   CREATININE 11.88* 12.52* 3.88*   CALCIUM 8.3* 8.5 8.9     Recent Labs     03/11/24  1809   INR 1.16*                   Imaging  CT-HEAD W/O   Final Result         1.  Low-density changes in the bilateral parietal occipital lobes, greater on the left and overall more pronounced compared to prior study, appearance compatible with evolving infarcts.   2.  Nonspecific white matter changes, commonly associated with small vessel ischemic disease.  Associated mild cerebral atrophy is noted.   3.  Atherosclerosis.         DX-CHEST-PORTABLE (1 VIEW)   Final Result      1.  Right IJ catheter in good position.      2.  Prominence of interstitial pulmonary vasculatures consistent with mild fluid overload.      IR-PERQ NEPH CATH NEW ACCESS (ALL RADIOLOGY) LEFT   Final Result      1. ULTRASOUND AND FLUOROSCOPIC GUIDED PLACEMENT OF A LEFT 8 Citizen of the [place]  PIGTAIL LOCKING LOOP PERCUTANEOUS NEPHROSTOMY CATHETER.      2. NEPHROSTOGRAM SHOWING SATISFACTORY CATHETER POSITION. MODERATE LEFT  HYDRONEPHROSIS.      US-RENAL   Final Result      1.  Moderate LEFT hydronephrosis.   2.  Unremarkable RIGHT kidney.   3.  LEFT kidney cyst for which no further evaluation is necessary.      EC-ECHOCARDIOGRAM COMPLETE W/O CONT   Final Result      MR-BRAIN-W/O   Final Result      1.  Bilateral occipital infarcts. Hemorrhagic transformation is noted in the right occipital infarct.   2.  Abnormal T1 hypointensity of right side of C1 vertebral body and right occipital condyle. The review of CT scan of the neck demonstrates multifocal abnormal sclerotic areas. There is also destructive lesion in the right lateral condyle. These    findings are concerning for aggressive osseous process suggests metastasis. Further investigation is recommended.      CT-CTA NECK WITH & W/O-POST PROCESSING   Final Result   Addendum (preliminary) 1 of 1   Addendum:      There are scattered sclerotic osseous lesions seen within the thoracic    spine, manubrium and partially visualized ribs consistent with sclerotic    osseous metastatic disease.      Final      1.  Prominent proximal internal carotid artery atherosclerosis with less than 50% stenosis.   2.  Patent vertebral arteries.   3.  Layering probably moderate bilateral pleural effusions. Some probable bilateral pulmonary edema.      CT-CTA HEAD WITH & W/O-POST PROCESS   Final Result      1.  Acute/subacute appearing and chronic right occipital infarcts.   2.  No large vessel occlusion or aneurysm.      DX-CHEST-PORTABLE (1 VIEW)   Final Result      1.  Increased perihilar interstitial markings. Findings may represent interstitial edema or atypical infection.      2.  Mild bibasilar opacities likely represent atelectasis.      IR-PERQ NEPH CATH NEW ACCESS (ALL RADIOLOGY) RIGHT   Final Result         1. Ultrasound And Fluoroscopic Guided Placement Of a right sided 8 Chinese  Pigtail Locking Loop Percutaneous Nephrostomy Catheter.      2. Obstruction of the distal right ureter is  demonstrated with no flow of contrast into the urinary bladder.      2. Nephrostogram Showing Satisfactory Catheter Position Without Extravasation.      NM-RENAL WITH DIURETIC WASHOUT   Final Result      1. Normal renal flow and function scan of the left kidney.   2. Decreased uptake and excretion of the radiotracer by the right kidney. Findings suggest chronic right hydroureteronephrosis at the distal ureteral level with resultant right renal cortical atrophy.      OUTSIDE IMAGES-CT ABDOMEN /PELVIS   Final Result           Assessment/Plan  * Occipital stroke (HCC)- (present on admission)  Assessment & Plan  Developed abrupt symmetric Right hemianopsia 3/7/24 with dysmetria  CT demonstrates acute-on-chronic Right occipital CVA without LVO  Neurology consulted  LDL <50, returned to atorvastatin 20 mg  Telemetry  PT/OT/SLP recommend   MRI brain demonstrates bilateral occipital infarcts with Right hemorrhage  Holding ASA/Plavix due to Right occipital hemorrhage  TTE normal  A1c 5.8 consistent with prediabetes  BG goal <180  SBP goal <130, increased lasix and titrated labetalol    Per neurology recommendation will require repeat CT on Wednesday or Thursday to evaluate restarting anticoagulation    3/13/2024  Head CT per my review showing hypodensity in the left occipital lobe with no acute hemorrhages. No hydrocephalus.    Starting apixaban as per Dr. Chaudhary's recommendations from 3/8/2024    Moderate protein-calorie malnutrition (HCC)- (present on admission)  Assessment & Plan  Body mass index is 19.72 kg/m².    Significant subcutaneous tissue and muscle loss.    Likely due to malignancy.    Nutrition consult      Acute hemodialysis patient (HCC)- (present on admission)  Assessment & Plan  3/13/2024  Improving.  I placed referral for outpatient nephrology    Hypertension secondary to other renal disorders- (present on admission)  Assessment & Plan  Due to hypervolemia from NEREIDA/ATN  Improved with IV  diuresis  Initiated on torsemide and amlodipine per Nephrology  Goal SBP <130 due to CVA per Neurology    Nausea  Assessment & Plan  Reports intermittent nausea and anorexia  Improved with scheduled zofran TIDAC  Unclear if this is symptomatic azotemia - if worsening will warrant RRT    Likely due to uremia  Likely will require HD soon.   Npo at [place]     3/13/2024  Resolved    Acute respiratory failure with hypoxia (HCC)  Assessment & Plan  Resolved with diuresis  Due to noncardiogenic pulmonary edema from NEREIDA  CXR increased interstitial edema  EKG not indicative of acute ischemic changes  Diuresis as tolerated  On RA  Hypoxia resolved    Constipation- (present on admission)  Assessment & Plan  Resolved  Continue bowel regimen    High anion gap metabolic acidosis- (present on admission)  Assessment & Plan  Resolved with iv bicarb  Due to NEREIDA  Following BMP    NEREIDA (acute kidney injury) (HCC)- (present on admission)  Assessment & Plan  Due to obstruction from obstructing stone and suspected intrinsic renal disease contributing  Plateau after recent NEREIDA suggestive of ATN  Worsened after CTA for occipital CVA due to contrast nephropathy  Nephrology consulted, no indication for RRT  Bicarb gtt discontinued due to noncardiogenic pulmonary edema  Avoid nephrotoxins  Repeat AM BMP  Kidney function not improving  US renal ordered. Discussed with nephrologist Dr Carreno     Patient going for nephrostomy tube placement on the left side, discussed with vascular surgery Dr. Sultana for temporary dialysis catheter access    Hydronephrosis with urinary obstruction due to renal calculus- (present on admission)  Assessment & Plan  Vs stricture  See above  Johansen and nephrostomy R tube in place  Urology s/o  IR following  Will repeat US renal today. F/u results      Now with left-sided hydronephrosis status post nephrostomy tube placement    3/13/2024  Plan to follow up with outpatient urology    Gross hematuria- (present on  admission)  Assessment & Plan    Due to percutaneous nephrostomy  Holding ASA for nephrostomy tube and due to hematuria  Improved.   Resolved    Hyperkalemia- (present on admission)  Assessment & Plan  Resolved  Due to NEREIDA  BMP reviewed  Follow-up BMP in a.m.    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 Shah    3/13/2024  Discussed with patient and wife.  They will follow up with Urology [place] outpatient clinic.    S/P coronary artery stent placement- (present on admission)  Assessment & Plan  As per history. Bare metal stent placed in 2007.    Coronary artery disease involving native coronary artery without angina pectoris- (present on admission)  Assessment & Plan  As per history. Bare metal stent to circumflex artery in 2007.    3/13/2024  Starting apixaban today  Continue atorvastatin        VTE prophylaxis:    therapeutic anticoagulation with eliquis 5 mg BID        I have performed a physical exam and reviewed and updated ROS and Plan today (3/13/2024). In review of yesterday's note (3/12/2024), there are no changes except as documented above.      53 minutes spent prepping to see patient (e.g. review of tests) obtaining and/or reviewing separately obtained history. Performing a medically appropriate examination and evaluation.  Counseling and educating the patient/family/caregiver.  Ordering medications, tests, or procedures.  Referring and communicating with other health care professionals.  Documenting clinical information in EPIC.  Independently interpreting results and communicating results to patient/family/caregiver.  Care coordination.

## 2024-03-14 NOTE — PROGRESS NOTES
"Radiology Progress Note   Author: JUAREZ Dixon Date & Time created: 3/14/2024 8:59 AM   Date of admission  3/1/2024  Note to reader: this note follows the APSO format rather than the historical SOAP format. Assessment and plan located at the top of the note for ease of use.    Chief Complaint  73 y.o. male admitted 3/1/2024 with right sided back pain    HPI  Kaleb Yanes is a 72 yo male with PMH significant for prostate cancer, CAD s/p coronary artery stent placemen (8/2007), HTN, and HLD who presented to OSH with hematuria associated increasing left sided flank and back pain and was diagnosed with acute renal failure. Pt was transported to [facility] for higher level of care. 3/1/24 Lasix renal scan with diuretic washout revealed normal renal flow and function of the left kidney with decreased uptake and excretion by the right kidney. \"Findings suggest chronic right hydroureteronephrosis at the distal ureteral level with resultant right renal cortical atrophy\" (Avinash Crystal MD) 3/3/24 IR was consulted and Dr. Waite from IR performed a right nephrostogram with 8 fr nephrostomy tube placement on 03/04/24.     Interval History:   03/04/24- Pt  not in room undergoing right nephrostogram with right 8 fr nephrostomy tube placement  For maximal decompression of kidney  MAG3 shows decreased renal function.    03/05/24-right #8 Kittitian nephrostomy tube with 775 mL of pink urine, without clots, output in the last 24 hours.  Ordered placed for RNs to flush nephrostomy tube only for bloody urine or urine with clots.  Renal function continues to worsen despite nephrostomy tube placement  I reviewed the most recent labs: WBC 11.7; Hgb  12.3,  Cr 7.14  Coags INR 1.19,      03/06/24-right #8 Kittitian nephrostomy tube with 400 mL of pink urine, without clots, output in the last 24 hours.  Ordered placed for RNs to flush nephrostomy tube ONLY for bloody urine or urine with clots.Discussed drain care/management  " with pt and family at bedside.   I reviewed the most recent labs: WBC 11.0; Hgb  12.6,  Cr 6.79 ; Coags INR 1.19,     03/08/24-unfortunately yesterday a.m. patient's wife noted visual changes and patient . Neurology was consulted and he underwent a CT with contrast followed by MRI brain which confirmed bilateral occipital infarcts with some hemorrhagic transformation in the right.  He was transferred to the neurology floor. Right #8 Tanzanian nephrostomy tube with 2000 mL of straw-colored urine, output in the last 24 hours.  Ordered placed for RNs to flush nephrostomy tube ONLY for bloody urine or urine with clots.Discussed drain care/management  with pt and patient wife at bedside. I reviewed the most recent labs: WBC 10.4; Hgb 12.0,  Cr 8.95; -increase in creatinine most likely reflective from contrast given with CT.    03/11/24 -right neph tube to right flank with 1100 mL output in the last 24 hours. Wife reports output was light red this morning but has resolved and is currently straw colored.  Labs reviewed; creatinine 11.88, GFR 4, no CBC today.  Nephrologist considering hemodialysis tomorrow if creatinine does not improve.      03/12/24 - Left neph tube placed by IR Dr Boyd. HD temp catheter placed by vascular surgeon Dr Sultana. Pt started dialysis.     03/13/24 - Right neph tube to right flank with 425 mL output in the last 24 hours. Left neph tube to left flank with 1920 mL output in the last 24 hours. Labs reviewed; WBC 9.9, creatinine 3.88, GFR 16.     03/14/24 - Right neph tube to right flank with 275 mL output in the last 24 hours. Left neph tube to left flank with 1525 mL output in the last 24 hours. Labs reviewed; creatinine 1.89, GFR 37.    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   - For long term use, Nephrostomy tube will need to be exchanged every 3 months.  - Ok to shower with catheter as long as site is covered with waterproof dressing; change dressing if it becomes wet.  - No baths or submerging site under water until catheter removed   - From an IR standpoint, OK to discharge with bilat neph tubes in place with urology follow up once medically cleared.   - IR will continue to follow nephrostomy tube while pt is in the hospital; we may not round every day     -Thank you for allowing Interventional Radiology team to participate in the patients care, if any additional care or requests are needed in the future please do not hesitate call or place IR order. [phone]           Review of Systems  Physical Exam   Review of Systems   Constitutional:  Positive for malaise/fatigue. Negative for chills and fever.   HENT:  Negative for hearing loss.    Eyes:  Negative for blurred vision.   Respiratory:  Negative for cough and shortness of breath.    Cardiovascular:  Negative for chest pain.   Gastrointestinal:  Negative for abdominal pain, heartburn, nausea and vomiting.   Genitourinary:  Negative for flank pain.   Neurological:  Negative for dizziness, speech change, focal weakness and headaches.      Vitals:    03/14/24 1132   BP: 121/72   Pulse: 70   Resp: 18   Temp: 36.7 °C (98 °F)   SpO2: 98%        Physical Exam  Vitals and nursing note reviewed.   Constitutional:       Appearance: Normal appearance.   Cardiovascular:      Rate and Rhythm:  Normal rate.   Pulmonary:      Effort: Pulmonary effort is normal. No respiratory distress.   Abdominal:      General: Abdomen is flat.      Palpations: Abdomen is soft.   Genitourinary:     Comments: nephrostomy tubes to bilateral flank  Skin:     Capillary Refill: Capillary refill takes less than 2 seconds.   Neurological:      Mental Status: He is alert and oriented to person, place, and time. Mental status is at baseline.      Motor: No weakness.      Comments:                Labs    Recent Labs     03/12/24 0228 03/13/24  0400   WBC 9.8 9.9   RBC 4.21* 4.26*   HEMOGLOBIN 12.5* 12.3*   HEMATOCRIT 36.5* 37.0*   MCV 86.7 86.9   MCH 29.7 28.9   MCHC 34.2 33.2   RDW 40.7 41.1   PLATELETCT 315 273   MPV 9.7 9.7       Recent Labs     03/12/24 0228 03/13/24  0400 03/14/24  0139   SODIUM 135 139 139   POTASSIUM 5.3 4.3 4.2   CHLORIDE 92* 99 101   CO2 20 22 24   GLUCOSE 75 77 119*   BUN 93* 40* 28*   CREATININE 12.52* 3.88* 1.89*   CALCIUM 8.5 8.9 9.1     Recent Labs     03/12/24 0228 03/13/24  0400 03/14/24  0139   ALBUMIN 3.2 3.5 3.7   CREATININE 12.52* 3.88* 1.89*     CT-HEAD W/O   Final Result         1.  Low-density changes in the bilateral parietal occipital lobes, greater on the left and overall more pronounced compared to prior study, appearance compatible with evolving infarcts.   2.  Nonspecific white matter changes, commonly associated with small vessel ischemic disease.  Associated mild cerebral atrophy is noted.   3.  Atherosclerosis.         DX-CHEST-PORTABLE (1 VIEW)   Final Result      1.  Right IJ catheter in good position.      2.  Prominence of interstitial pulmonary vasculatures consistent with mild fluid overload.      IR-PERQ NEPH CATH NEW ACCESS (ALL RADIOLOGY) LEFT   Final Result      1. ULTRASOUND AND FLUOROSCOPIC GUIDED PLACEMENT OF A LEFT 8 Greenlandic  PIGTAIL LOCKING LOOP PERCUTANEOUS NEPHROSTOMY CATHETER.      2. NEPHROSTOGRAM SHOWING SATISFACTORY CATHETER POSITION. MODERATE LEFT  HYDRONEPHROSIS.      US-RENAL   Final Result      1.  Moderate LEFT hydronephrosis.   2.  Unremarkable RIGHT kidney.   3.  LEFT kidney cyst for which no further evaluation is necessary.      EC-ECHOCARDIOGRAM COMPLETE W/O CONT   Final Result      MR-BRAIN-W/O   Final Result      1.  Bilateral occipital infarcts. Hemorrhagic transformation is noted in the right occipital infarct.   2.  Abnormal T1 hypointensity of right side of C1 vertebral body and right occipital condyle. The review of CT scan of the neck demonstrates multifocal abnormal sclerotic areas. There is also destructive lesion in the right lateral condyle. These    findings are concerning for aggressive osseous process suggests metastasis. Further investigation is recommended.      CT-CTA NECK WITH & W/O-POST PROCESSING   Final Result   Addendum (preliminary) 1 of 1   Addendum:      There are scattered sclerotic osseous lesions seen within the thoracic    spine, manubrium and partially visualized ribs consistent with sclerotic    osseous metastatic disease.      Final      1.  Prominent proximal internal carotid artery atherosclerosis with less than 50% stenosis.   2.  Patent vertebral arteries.   3.  Layering probably moderate bilateral pleural effusions. Some probable bilateral pulmonary edema.      CT-CTA HEAD WITH & W/O-POST PROCESS   Final Result      1.  Acute/subacute appearing and chronic right occipital infarcts.   2.  No large vessel occlusion or aneurysm.      DX-CHEST-PORTABLE (1 VIEW)   Final Result      1.  Increased perihilar interstitial markings. Findings may represent interstitial edema or atypical infection.      2.  Mild bibasilar opacities likely represent atelectasis.      IR-PERQ NEPH CATH NEW ACCESS (ALL RADIOLOGY) RIGHT   Final Result         1. Ultrasound And Fluoroscopic Guided Placement Of a right sided 8 Iraqi  Pigtail Locking Loop Percutaneous Nephrostomy Catheter.      2. Obstruction of the distal right ureter is  "demonstrated with no flow of contrast into the urinary bladder.      2. Nephrostogram Showing Satisfactory Catheter Position Without Extravasation.      NM-RENAL WITH DIURETIC WASHOUT   Final Result      1. Normal renal flow and function scan of the left kidney.   2. Decreased uptake and excretion of the radiotracer by the right kidney. Findings suggest chronic right hydroureteronephrosis at the distal ureteral level with resultant right renal cortical atrophy.      OUTSIDE IMAGES-CT ABDOMEN /PELVIS   Final Result        INR   Date Value Ref Range Status   03/11/2024 1.16 (H) 0.87 - 1.13 Final     Comment:     INR - Non-therapeutic Reference Range: 0.87-1.13  INR - Therapeutic Reference Range: 2.0-4.0       No results found for: \"POCINR\"     Intake/Output Summary (Last 24 hours) at 3/4/2024 1535  Last data filed at 3/4/2024 0332  Gross per 24 hour   Intake 120 ml   Output 800 ml   Net -680 ml      Labs not explicitly included in this progress note were reviewed by the author. Radiology/imaging not explicitly included in this progress note was reviewed by the author.                    INR   Date Value Ref Range Status   03/02/2024 1.21 (H) 0.87 - 1.13 Final       Comment:       INR - Non-therapeutic Reference Range: 0.87-1.13  INR - Therapeutic Reference Range: 2.0-4.0         No results found for: \"POCINR\"      Intake/Output Summary (Last 24 hours) at 3/3/2024 1626  Last data filed at 3/3/2024 1200      Gross per 24 hour   Intake 28 ml   Output 1997.5 ml   Net -1969.5 ml       Labs not explicitly included in this progress note were reviewed by the author. Radiology/imaging not explicitly included in this progress note was reviewed by the author.      I have performed a physical exam and reviewed and updated ROS and Plan today (3/5/2024).      31 minutes in directly providing and coordinating care and extensive data review.  No time overlap and excludes procedures.                   "

## 2024-03-14 NOTE — DISCHARGE PLANNING
Case Management Discharge Planning    Admission Date: 3/1/2024  GMLOS: 3.8  ALOS: 13    6-Clicks ADL Score: 18  6-Clicks Mobility Score: 19      Anticipated Discharge Dispo: Discharge Disposition: Discharged to home/self care (01)    DME Needed: No    Action(s) Taken: Updated Provider/Nurse on Discharge Plan, notified [facility] of discharge, states they will not be able to start service until March 25th, No other  agency services [facility], they are aware and will start service sooner if possible. Spoke with Jennifer patients spouse, explained she can take Kaleb to his PCP and get an order for outpatient PT to start right away. She prefers to wait for [facility]. She stated she can work with him and has been helping him while in the hospital. Explained risks of falling at home and starting outpatient PT would be best, she chooses to wait, verbalized understanding of risks.    Escalations Completed: None    Medically Clear: Yes    Next Steps: CM will continue to follow for discharge planning needs.    Barriers to Discharge: None    Is the patient up for discharge tomorrow: No

## 2024-03-14 NOTE — PROGRESS NOTES
Note to reader: this note follows the APSO format rather than the historical SOAP format. Assessment and plan located at the top of the note for ease of use.    Chief Complaint  73 y.o. year old male here with No chief complaint on file.      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      S/P coronary artery stent placement [Z95.5]      BMS to circ.       3/14. Lying in bed in NAD. Reports new onset GH in NPT after restarting eliquis. Urine in L NPT is light cherry without clots, urine in R NPT is cherry cola colored without clots. Excellent UOP, 2250cc yesterday. Cr further improved to 1.89. Started bicalutamide this am, tolerating well. AFVSS, H/H stable.    3/13. Seen and examined. S/p IR placement of L NPT yesterday as well as HD; Cr improved from 12.88 to 3.88. Excellent UOP-- 925cc from R NPT and 1470cc from left. Antegrade nephrostogram did not demonstrate proximal obstruction of ureter. PSA 73.4 (prior to firmagon in Jan, PSA >250), total testosterone 13. Discussed with pharmacist, ok for bicalutamide 150mg QD, not necessary to adjust dose per renal function. Pt denies pain, N/V/F/C.     3/12. Re-consulted yesterday as patient's Cr continues to rise despite good UOP from R NPT, and LILY 3/11 demonstrated new moderate L hydronephrosis. Patient in IR at time of rounds, but per chart review, Cr today is 12.52 (11.88) and has been steadily rising, despite UOP of 1300cc from R  NPT. PVRs have been <50cc. Per nephrology note, plans have been made for temp cath to be placed for HD as well.    Plan:  - OK to discharge from urology perspective with bilat NPT in place  - Would recommend holding off on eliquis for today, ok to restart tomorrow   - Urology will contact patient to arrange expedited follow up with advanced prostate cancer clinic  - No further urologic intervention anticipated during this admission. Urology signing off, please call with questions.       Case discussed with Dr Sr, who has directed this patient's plan of care.      Review of Systems  Physical Exam   Review of Systems   Constitutional:  Negative for chills and fever.   Gastrointestinal:  Negative for abdominal pain, nausea and vomiting.   Genitourinary:  Positive for hematuria. Negative for dysuria and flank pain.   All other systems reviewed and are negative.    Vitals:    03/14/24 0604 03/14/24 0830 03/14/24 0911 03/14/24 1132   BP: 116/67 127/74 125/73 121/72   Pulse:  73  70   Resp:  18  18   Temp:  36.7 °C (98.1 °F)  36.7 °C (98 °F)   TempSrc:  Temporal  Temporal   SpO2:  96%  98%   Weight:       Height:         Physical Exam  Vitals and nursing note reviewed.   HENT:      Head: Normocephalic.      Nose: Nose normal.      Mouth/Throat:      Pharynx: Oropharynx is clear.   Eyes:      Conjunctiva/sclera: Conjunctivae normal.   Pulmonary:      Effort: Pulmonary effort is normal.   Abdominal:      General: There is no distension.      Palpations: Abdomen is soft.      Comments: L NPT light cherry colored urine without clots, R NPT draining cherry cola colored urine without clots   Musculoskeletal:         General: Normal range of motion.      Cervical back: Normal range of motion.   Skin:     General: Skin is warm.   Neurological:      General: No focal deficit present.      Mental Status: He is alert.   Psychiatric:         Mood and Affect: Mood normal.         Behavior: Behavior normal.          Hematology  Chemistry   Lab Results   Component Value Date/Time    WBC 9.9 03/13/2024 04:00 AM    HEMOGLOBIN 12.3 (L) 03/13/2024 04:00 AM    HEMATOCRIT 37.0 (L) 03/13/2024 04:00 AM    PLATELETCT 273 03/13/2024 04:00 AM     Lab Results   Component Value Date/Time    SODIUM 139 03/14/2024 01:39 AM    POTASSIUM 4.2 03/14/2024 01:39 AM    CHLORIDE 101 03/14/2024 01:39 AM    CO2 24 03/14/2024 01:39 AM    GLUCOSE 119 (H) 03/14/2024 01:39 AM    BUN 28 (H) 03/14/2024 01:39 AM    CREATININE 1.89 (H) 03/14/2024 01:39 AM    CREATININE 1.0 08/18/2007 05:50 AM         Labs not explicitly included in this progress note were reviewed by the author.   Radiology/imaging not explicitly included in this progress note was reviewed by the author.     Labs reviewed, Radiology images reviewed and Medications reviewed

## 2024-03-14 NOTE — PROGRESS NOTES
[facility] -  PROGRESS NOTE               Author: Giancarlo Carreno D.O. Date & Time: 3/14/2024  2:02 PM     HPI:  72 yo male from [facility] with PMH significant for metastatic prostate cancer -  on hormone blocking medication, due for next injection in March. , CAD s/p coronary artery stent placemen (8/2007), HTN, and HLD who presented to OSH with hematuria associated with worsening left sided flank and back pain x 2 days. At OSH he was diagnosed with acute renal failure with presenting Crt of 7 and was transported to [facility] for higher level of care on 3/1/24. Previous Crt level noted to be 0.92 in July 2022 and 2 weeks prior to admission his creatinine was 1.37 . On arrival he had a Lasix renal scan with diuretic which revealed normal renal flow and function of the left kidney with decreased uptake and excretion by the right kidney; and findings suggestive of chronic right hydroureteronephrosis at the distal ureteral level with resultant right renal cortical atrophy. Urology was consulted and recommended a Arreola catheter placement. His Crt improved to 4.92 and further to 4.0 before worsening again to 5.13 today. A Rt Nephrostomy tube was placed by PA today to see if this will help improve renal functions. His flank pain has since resolved. Hematuria has cleared up as well. No F/C/N/V/CP/SOB.  No melena, hematochezia, hematemesis.  No HA, visual changes, or abdominal pain. Nephrology consulted for management of NEREIDA and possible need for dialysis      DAILY NEPHROLOGY SUMMARY:  03/04 - consult done  03/05 - SCr trended up, arreola dislodged and not replaced due to PVR 0, SBP 140s-160s, UOP 1075mL, no freely voided urine recorded, UA with moderate occult blood, 0-2 hyaline casts, no protein, no new c/o, reports that he is finally voiding and urine via bladder is clearing  03/06 - SCr trending down, UOP 970mL recorded, SBP 140s-170s, no new c/o, has been walking laps, wife encouraging him to increase  PO intake  03/07 - SCr stable, UOP 1600mL, SBP 130s-160s, has c/o SOB overnight, mild tachypnea noted, CXR with some increased interstitial markings, IVF stopped this AM, reports now SOB improved and good UOP via NPT, +constipation ongoing, no other new c/o  03/08 - SBP 130s-170s, developed abrupt right symmetric hemianopsia yesterday, stat CTA with acute/subacute and chronic R occipital infarcts, MRI brain w/o with bilateral occipital infarcts and hemorrhagic transformation on the R, both images demonstrate osseous lesions, patient reports he believes his visual deficits may be slightly improved from yesterday, UOP 2L, SCr trended up to 8.95, +fatigue, no other new c/o  03/09 - SBP 100s-140s, UOP 1.2L, Scr trending up, vision slightly improved, ongoing fatigue, no new c/o but wife reports poor urine output via bladder, that patient feels he needs to go but then doesn't produce urine  03/10 - SCr trended UOP, UOP 790mL, bladder scan <50cc, SBP 120s-140s, patient and wife believe his UOP improving, he is going to the [employer] garden today, notes some cough productive of phlegm, otherwise no new c/o  3/11: Cr continues to rise, but UOP 1100cc/day. Discussed potential need for dialysis. Wife at bedside and both pt and wife are very concerned.  3/12: renal US yesterday showed L hydronephrosis. Pt's wife and son at bedside this AM and discussed case. Pt reports feeling very weak/fatigued and with nausea and poor appetite  3/13: pt received HD yesterday for uremic symptoms. Had L nephrstomy tube placed, with ~ 1.9L UOP. Cr today 3.9. Pt complaining of pain over new nephrostomy site  3/14: pt's Cr significantly improved. UOP robust with b/l nephrostomy tubes.     REVIEW OF SYSTEMS:    +weakness, +n, poor appetite, no CP. + pain over nephrostomy site  10 point ROS reviewed and is as per HPI/daily summary or otherwise negative    PMH/PSH/SH/FH: Reviewed and unchanged since admission note  CURRENT MEDICATIONS: Reviewed from  "admission to present day    VS:  /72   Pulse 70   Temp 36.7 °C (98 °F) (Temporal)   Resp 18   Ht 1.702 m (5' 7\")   Wt 55 kg (121 lb 4.1 oz)   SpO2 98%   BMI 19.03 kg/m²   Physical Exam  Vitals and nursing note reviewed.   Constitutional:       General: He is not in acute distress.  HENT:      Head: Normocephalic and atraumatic.      Right Ear: External ear normal.      Left Ear: External ear normal.      Nose: Nose normal.      Mouth/Throat:      Mouth: Mucous membranes are moist.      Pharynx: Oropharynx is clear.   Eyes:      General: No scleral icterus.     Extraocular Movements: Extraocular movements intact.   Cardiovascular:      Rate and Rhythm: Normal rate and regular rhythm.   Pulmonary:      Effort: Pulmonary effort is normal.      Breath sounds: No wheezing or rales.   Abdominal:      General: Abdomen is flat. There is no distension.   Musculoskeletal:      Right lower leg: No edema.      Left lower leg: No edema.   Skin:     General: Skin is warm and dry.      Findings: No bruising.   Neurological:      Mental Status: He is alert and oriented to person, place, and time.      Motor: No weakness.   Psychiatric:         Mood and Affect: Mood normal.         Behavior: Behavior normal.         Thought Content: Thought content normal.         Judgment: Judgment normal.         Fluids:  In: -   Out: 1800     LABS:  Recent Labs     03/12/24  0228 03/13/24  0400 03/14/24  0139   SODIUM 135 139 139   POTASSIUM 5.3 4.3 4.2   CHLORIDE 92* 99 101   CO2 20 22 24   GLUCOSE 75 77 119*   BUN 93* 40* 28*   CREATININE 12.52* 3.88* 1.89*   CALCIUM 8.5 8.9 9.1     Imaging: reviewed    IMPRESSION:  # NEREIDA, resolving.    -Peak Cr 12.5->1.89 (3.14)              - baseline Crt around 1.37 per labs 2 weeks prior to admission  - Presenting Crt 7 at OSH, improved to 4.0 after arreola placement but has worsened again to peak Cr 12.5  - L sided hydro noted on 3/11, s/p L nephrostomy on 3/12  - temp HD x1 on 3/12 due to " severe uremia sx  # CKD 3  #Atrophic /Echogenic Rt kidney               - Lasix renal scan 3/1/24: Normal renal flow and function scan of the left kidney.              chronic right hydroureteronephrosis at the distal ureteral level with resultant right renal cortical atrophy;   - s/p R nephrostomy  # Mild Hyperkalemia   # Severe Metabolic Acidosis, improved  # Metastatic Prostate Cancer to bones               - started on Firmagon a month ago   # Gross Hematuria, resolved   # Constipation  # Occipital CVA  # HTN  - goal <140/90          PLAN:    -Cr continues to rapidly improve  -can remove HD line, no further HD need anticipated  -stop phos binders ca-acetate  - continue labetalol, amlodipine for BP  - Monitor urine output and B/L Nephrostomy output closely   - Daily labs   - Avoid Nephrotoxins, IV contrast  - Dose all meds per eGFR < 15  - strict I/O, use renal diet      Dispo: can remove HD catheter, renal recovery anticipated  Can DC from renal standpoint and f/u as an outpatient    Please page nephrology with any questions or concerns

## 2024-03-14 NOTE — CARE PLAN
The patient is Stable - Low risk of patient condition declining or worsening    Shift Goals  Clinical Goals: stable neuro assessments, monitor drains, comfort  Patient Goals: rest  Family Goals: updates      Patient is not progressing towards the following goals:    Problem: Urinary Elimination  Goal: Establish and maintain regular urinary output  Outcome: Progressing     Problem: Neuro Status  Goal: Neuro status will remain stable or improve  Outcome: Progressing

## 2024-03-14 NOTE — PROGRESS NOTES
Monitor Summary: SR 70-83, NC 0.16, QRS 0.09, QT 0.43, with rare PACs and frequent PVCs per strip from monitor room.

## 2024-03-14 NOTE — PROGRESS NOTES
Pt discharged from unit. All belongings with pt. PIV removed. Discharge paperwork reviewed with patient, all questions answered, and paperwork signed. One copy with patient, and one copy kept to be scanned into patient's chart. M2B received and reviewed.

## 2024-03-15 LAB
BACTERIA UR CULT: NORMAL
GRAM STN SPEC: NORMAL
SIGNIFICANT IND 70042: NORMAL
SITE SITE: NORMAL
SOURCE SOURCE: NORMAL

## 2024-03-15 NOTE — DISCHARGE SUMMARY
Discharge Summary    CHIEF COMPLAINT ON ADMISSION  Hematuria      Reason for Admission  Hydronephrosis and obstructive acute renal failure     Admission Date  3/1/2024    CODE STATUS  Full Code    HPI & HOSPITAL COURSE  Kaleb Yanes is a 73 y.o. male, who presented on 3/1/2024 with hematuria and acute renal failure due to ureteral obstruction in setting of prostate cancer.  This is a pleasant gentleman with a history of prostate cancer followed by [facility] currently residing in [place], [place].  He presented to [facility] in [place], [place] with increasing left-sided flank pain and back pain, for which she had been taking ibuprofen.  He woke up in the morning and had 3 episodes of hematuria prompting him to go to the emergency room for further evaluation, where he was noted to have acute renal failure with creatinine of 7 with hyperkalemia with potassium of 5.4.  Two weeks ago, his creatinine was noted to be 1.37.  CT scan of the abdomen and pelvis at the outside hospital without contrast showed right-sided hydronephrosis similar to prior CT scan from September 2023.  Mild left hydronephrosis thought to be related to stricture secondary to lymphadenopathy.  Lymphadenopathy noted in the iliac chain, inferior retroperitoneal space, and periaortic space.    Patient was subsequently transferred to [facility] for higher level care for nephrology support due to patient's acute postobstructive renal failure.    [facility] was consulted and continue to follow the patient.  Interventional radiology was consulted and underwent right nephrostogram with nephrostomy tube placement.  Nephrology was consulted.  Due to patient's worsening renal function with creatinine increasing to 12.52, the patient temporary dialysis placed and underwent hemodialysis session.  Patient's renal function improved to 3.88 and then two 1.89, and the patient did not require further dialysis  sessions.    On the night of 3/6/2024, patient had some confusion.  On the following morning, the wife noticed patient having difficulty seeing objects with the right peripheral vision.  Head CT revealed hypodensities in the bilateral occipital head concerning for acute infarcts.  Mild petechial hemorrhages were noted as well per the nephrologist.  CT angiogram of the head and neck revealed no evidence of large vessel occlusion.  However, some atherosclerotic disease was noted.  Due to concern for recent hemorrhagic transformation, antiplatelet therapy was held until MRI was performed.  MRI revealed bilateral occipital infarcts with hemorrhagic transformation noted in the right occipital part.  Abnormal T1 hypointensity of the right side of the C1 vertebral body and right occipital condyle noted.  Also noted was destructive lesion in the right lateral condyle.  Findings were concerning for aggressive osseous process suggestive of metastases.    Patient's stroke workup labs included LDL of 48, within goal.  Hemoglobin A1c of 5.8 also within goal.  Echocardiogram showed normal left ventricular size, thickness, and systolic function, ejection fraction of 59%, normal right ventricular size and systolic function.  Mild mitral regurgitation and mild tricuspid regurgitation.  Normal left atrial size.    Per neurology recommendations, repeat head CT was performed on 3/13/2024, which showed evolving infarctions in the bilateral parietal occipital lobes greater on the left.  Patient was started on apixaban, but on the following day, he developed hematuria, which was evaluated by urology prior to discharge.  Patient was instructed to hold his apixaban until the urine cleared up to a yellow color prior to initiation of apixaban for empiric treatment of stroke although the exact etiology remains unclear.  The patient and wife understood the risks of continuing hematuria with apixaban and was instructed to discontinue if  hematuria redevelop.  Patient will be following up closely with [facility].  They are currently awaiting acceptance by referral to [facility].    Patient was evaluated by occupational and physical therapy, recommended home health services for continuing therapies.    Therefore, he is discharged in good and stable condition to home with organized home healthcare and close outpatient follow-up.    The patient met 2-midnight criteria for an inpatient stay at the time of discharge.    Discharge Date  3/14/2024    FOLLOW UP ITEMS POST DISCHARGE  -Follow-up with primary care provider in 3 to 5 days.  -Follow-up with [facility]  -Establish care and follow-up with [facility].    DISCHARGE DIAGNOSES  Principal Problem:    Occipital stroke (HCC) (POA: Yes)  Active Problems:    Acute right PCA stroke (HCC) (POA: No)    Acute renal failure with tubular necrosis (HCC) (POA: Yes)    Acute bilateral obstructive uropathy (POA: Yes)    Malignant neoplasm of prostate metastatic to bone (HCC) (POA: Yes)    Hypercoagulable state (HCC) (POA: Yes)    Coronary artery disease involving native coronary artery without angina pectoris (POA: Yes)      Overview: History of inferior wall microinfarction tree with a bare-metal stent to       the circumflex artery-2007      Noncritical LAD disease.    S/P coronary artery stent placement (POA: Yes)      Overview: BMS to circ.    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 (HCC) (POA: Yes)    Gross hematuria (POA: Yes)    Hydronephrosis with urinary obstruction due to renal calculus (POA: Yes)    NEREIDA (acute kidney injury) (HCC) (POA: Yes)    Moderate protein-calorie malnutrition (HCC) (POA: Yes)    DNR (do not resuscitate) (POA: Yes)  Resolved Problems:    Hyperkalemia (POA: Yes)    High anion gap metabolic acidosis (POA: Yes)    Constipation (POA: Yes)    Acute respiratory failure with hypoxia (HCC) (POA: No)    Nausea (POA: Clinically Undetermined)     Hypertension secondary to other renal disorders (POA: Yes)    Acute hemodialysis patient (HCC) (POA: Yes)      FOLLOW UP  No future appointments.  Jean John M.D.  [id number]  [place] [address]  [phone]    Follow up  [facility] will contact patient to arrange outpatient follow up.      MEDICATIONS ON DISCHARGE     Medication List        START taking these medications        Instructions   Eliquis 5 MG Tabs  Generic drug: apixaban   Take 1 Tablet by mouth 2 times a day. Indications: Thromboembolism secondary to Atrial Fibrillation  Dose: 5 mg            CONTINUE taking these medications        Instructions   atorvastatin 20 MG Tabs  Commonly known as: Lipitor   Take 1 Tablet by mouth every day.  Dose: 20 mg     tamsulosin 0.4 MG capsule  Commonly known as: Flomax   Take 0.4 mg by mouth every day.  Dose: 0.4 mg            STOP taking these medications      aspirin 81 MG tablet              Allergies  Allergies   Allergen Reactions    Nkda [No Known Drug Allergy]        DIET  Regular      ACTIVITY  As tolerated.  Weight bearing as tolerated    CONSULTATIONS  Urology  Interventional radiology  Nephrology  Neurology  Vascular surgery    PROCEDURES  Temporary nontunneled dialysis catheter placement by Dr. Jean Sultana 3/12/2024    LABORATORY  Lab Results   Component Value Date    SODIUM 139 03/14/2024    POTASSIUM 4.2 03/14/2024    CHLORIDE 101 03/14/2024    CO2 24 03/14/2024    GLUCOSE 119 (H) 03/14/2024    BUN 28 (H) 03/14/2024    CREATININE 1.89 (H) 03/14/2024    CREATININE 1.0 08/18/2007        Lab Results   Component Value Date    WBC 9.9 03/13/2024    HEMOGLOBIN 12.3 (L) 03/13/2024    HEMATOCRIT 37.0 (L) 03/13/2024    PLATELETCT 273 03/13/2024        Total time of the discharge process 44 minutes.

## 2024-05-22 ENCOUNTER — APPOINTMENT (OUTPATIENT)
Dept: NEPHROLOGY | Facility: MEDICAL CENTER | Age: 74
End: 2024-05-22
Attending: STUDENT IN AN ORGANIZED HEALTH CARE EDUCATION/TRAINING PROGRAM
Payer: MEDICARE

## 2024-11-14 ENCOUNTER — HOSPITAL ENCOUNTER (OUTPATIENT)
Dept: RADIOLOGY | Facility: MEDICAL CENTER | Age: 74
End: 2024-11-14
Attending: INTERNAL MEDICINE
Payer: MEDICARE

## 2024-11-14 DIAGNOSIS — C7A.1 LARGE CELL NEUROENDOCRINE CARCINOMA (HCC): ICD-10-CM

## 2024-11-14 PROCEDURE — A9592 CT-PETCT-NEUROENDOCRINE SKULL BASE TO MID-THIGH: HCPCS

## 2025-03-23 NOTE — TELEPHONE ENCOUNTER
Is the patient due for a refill? YES    Was the patient seen the past year? YES   Date of last office visit: 05/26/2021    Does the patient have an upcoming appointment?  NO    Provider to refill:   Does the patients insurance require a 100 day supply?  NO   Normal results please call patient.